# Patient Record
Sex: MALE | Race: WHITE
[De-identification: names, ages, dates, MRNs, and addresses within clinical notes are randomized per-mention and may not be internally consistent; named-entity substitution may affect disease eponyms.]

---

## 2019-01-23 ENCOUNTER — HOSPITAL ENCOUNTER (INPATIENT)
Dept: HOSPITAL 10 - E/R | Age: 79
LOS: 8 days | Discharge: NURSING, REHAB OR LONG-TERM CARE | DRG: 65 | End: 2019-01-31
Attending: INTERNAL MEDICINE | Admitting: INTERNAL MEDICINE
Payer: MEDICARE

## 2019-01-23 ENCOUNTER — HOSPITAL ENCOUNTER (INPATIENT)
Dept: HOSPITAL 91 - E/R | Age: 79
LOS: 8 days | Discharge: NURSING, REHAB OR LONG-TERM CARE | DRG: 65 | End: 2019-01-31
Payer: MEDICARE

## 2019-01-23 VITALS
BODY MASS INDEX: 23.8 KG/M2 | HEIGHT: 65 IN | HEIGHT: 65 IN | BODY MASS INDEX: 23.8 KG/M2 | WEIGHT: 142.86 LBS | WEIGHT: 142.86 LBS

## 2019-01-23 VITALS — SYSTOLIC BLOOD PRESSURE: 156 MMHG | DIASTOLIC BLOOD PRESSURE: 74 MMHG | RESPIRATION RATE: 20 BRPM | HEART RATE: 85 BPM

## 2019-01-23 VITALS — DIASTOLIC BLOOD PRESSURE: 63 MMHG | HEART RATE: 89 BPM | SYSTOLIC BLOOD PRESSURE: 118 MMHG | RESPIRATION RATE: 16 BRPM

## 2019-01-23 VITALS — HEART RATE: 91 BPM

## 2019-01-23 VITALS — HEART RATE: 85 BPM

## 2019-01-23 DIAGNOSIS — Z79.4: ICD-10-CM

## 2019-01-23 DIAGNOSIS — E11.9: ICD-10-CM

## 2019-01-23 DIAGNOSIS — N39.0: ICD-10-CM

## 2019-01-23 DIAGNOSIS — G20: ICD-10-CM

## 2019-01-23 DIAGNOSIS — G81.91: ICD-10-CM

## 2019-01-23 DIAGNOSIS — I63.9: Primary | ICD-10-CM

## 2019-01-23 LAB
ADD MAN DIFF?: NO
ADD UMIC: YES
ANION GAP: 13 (ref 5–13)
BASOPHIL #: 0.1 10^3/UL (ref 0–0.1)
BASOPHILS %: 0.5 % (ref 0–2)
BLOOD UREA NITROGEN: 25 MG/DL (ref 7–20)
CALCIUM: 10.1 MG/DL (ref 8.4–10.2)
CARBON DIOXIDE: 26 MMOL/L (ref 21–31)
CHLORIDE: 96 MMOL/L (ref 97–110)
CHOL/HDL RATIO: 5.2 RATIO
CHOLESTEROL: 152 MG/DL (ref 100–200)
CREATININE: 0.74 MG/DL (ref 0.61–1.24)
EOSINOPHILS #: 0.2 10^3/UL (ref 0–0.5)
EOSINOPHILS %: 1.4 % (ref 0–7)
GLUCOSE: 277 MG/DL (ref 70–220)
HDL CHOLESTEROL: 29 MG/DL (ref 31–75)
HEMATOCRIT: 43.6 % (ref 42–52)
HEMOGLOBIN A1C: 8.1 % (ref 0–5.9)
HEMOGLOBIN: 15 G/DL (ref 14–18)
IMMATURE GRANS #M: 0.07 10^3/UL (ref 0–0.03)
IMMATURE GRANS % (M): 0.6 % (ref 0–0.43)
INR: 0.99
LDL CHOLESTEROL,CALCULATED: 105 MG/DL
LYMPHOCYTES #: 1.1 10^3/UL (ref 0.8–2.9)
LYMPHOCYTES %: 9.2 % (ref 15–51)
MEAN CORPUSCULAR HEMOGLOBIN: 30.7 PG (ref 29–33)
MEAN CORPUSCULAR HGB CONC: 34.4 G/DL (ref 32–37)
MEAN CORPUSCULAR VOLUME: 89.3 FL (ref 82–101)
MEAN PLATELET VOLUME: 8.4 FL (ref 7.4–10.4)
MONOCYTE #: 1.3 10^3/UL (ref 0.3–0.9)
MONOCYTES %: 10.7 % (ref 0–11)
NEUTROPHIL #: 9.6 10^3/UL (ref 1.6–7.5)
NEUTROPHILS %: 77.6 % (ref 39–77)
NUCLEATED RED BLOOD CELLS #: 0 10^3/UL (ref 0–0)
NUCLEATED RED BLOOD CELLS%: 0 /100WBC (ref 0–0)
PARTIAL THROMBOPLASTIN TIME: 29.2 SEC (ref 23–35)
PLATELET COUNT: 402 10^3/UL (ref 140–415)
POTASSIUM: 4.4 MMOL/L (ref 3.5–5.1)
PROTIME: 13.2 SEC (ref 11.9–14.9)
PT RATIO: 1
RED BLOOD COUNT: 4.88 10^6/UL (ref 4.7–6.1)
RED CELL DISTRIBUTION WIDTH: 12.1 % (ref 11.5–14.5)
SODIUM: 135 MMOL/L (ref 135–144)
TRIGLYCERIDES: 88 MG/DL (ref 0–149)
TROPONIN-I: < 0.012 NG/ML (ref 0–0.12)
UR ASCORBIC ACID: NEGATIVE MG/DL
UR BACTERIA: (no result) /HPF
UR BILIRUBIN (DIP): NEGATIVE MG/DL
UR BLOOD (DIP): (no result) MG/DL
UR CLARITY: (no result)
UR COLOR: (no result)
UR GLUCOSE (DIP): (no result) MG/DL
UR KETONES (DIP): (no result) MG/DL
UR LEUKOCYTE ESTERASE (DIP): (no result) LEU/UL
UR MUCUS: (no result) /HPF
UR NITRITE (DIP): POSITIVE MG/DL
UR PH (DIP): 5 (ref 5–9)
UR RBC: 14 /HPF (ref 0–5)
UR SPECIFIC GRAVITY (DIP): 1.02 (ref 1–1.03)
UR TOTAL PROTEIN (DIP): (no result) MG/DL
UR UROBILINOGEN (DIP): (no result) MG/DL
UR WBC: > 182 /HPF (ref 0–5)
WHITE BLOOD COUNT: 12.3 10^3/UL (ref 4.8–10.8)

## 2019-01-23 PROCEDURE — 82962 GLUCOSE BLOOD TEST: CPT

## 2019-01-23 PROCEDURE — 71045 X-RAY EXAM CHEST 1 VIEW: CPT

## 2019-01-23 PROCEDURE — 73030 X-RAY EXAM OF SHOULDER: CPT

## 2019-01-23 PROCEDURE — 93306 TTE W/DOPPLER COMPLETE: CPT

## 2019-01-23 PROCEDURE — 92526 ORAL FUNCTION THERAPY: CPT

## 2019-01-23 PROCEDURE — 70551 MRI BRAIN STEM W/O DYE: CPT

## 2019-01-23 PROCEDURE — 80048 BASIC METABOLIC PNL TOTAL CA: CPT

## 2019-01-23 PROCEDURE — 97110 THERAPEUTIC EXERCISES: CPT

## 2019-01-23 PROCEDURE — 93880 EXTRACRANIAL BILAT STUDY: CPT

## 2019-01-23 PROCEDURE — 70450 CT HEAD/BRAIN W/O DYE: CPT

## 2019-01-23 PROCEDURE — 85730 THROMBOPLASTIN TIME PARTIAL: CPT

## 2019-01-23 PROCEDURE — 97530 THERAPEUTIC ACTIVITIES: CPT

## 2019-01-23 PROCEDURE — 92610 EVALUATE SWALLOWING FUNCTION: CPT

## 2019-01-23 PROCEDURE — 80053 COMPREHEN METABOLIC PANEL: CPT

## 2019-01-23 PROCEDURE — 87086 URINE CULTURE/COLONY COUNT: CPT

## 2019-01-23 PROCEDURE — 85025 COMPLETE CBC W/AUTO DIFF WBC: CPT

## 2019-01-23 PROCEDURE — 80061 LIPID PANEL: CPT

## 2019-01-23 PROCEDURE — 85610 PROTHROMBIN TIME: CPT

## 2019-01-23 PROCEDURE — 97163 PT EVAL HIGH COMPLEX 45 MIN: CPT

## 2019-01-23 PROCEDURE — 97165 OT EVAL LOW COMPLEX 30 MIN: CPT

## 2019-01-23 PROCEDURE — 83036 HEMOGLOBIN GLYCOSYLATED A1C: CPT

## 2019-01-23 PROCEDURE — 36415 COLL VENOUS BLD VENIPUNCTURE: CPT

## 2019-01-23 PROCEDURE — 83735 ASSAY OF MAGNESIUM: CPT

## 2019-01-23 PROCEDURE — 97535 SELF CARE MNGMENT TRAINING: CPT

## 2019-01-23 PROCEDURE — 99291 CRITICAL CARE FIRST HOUR: CPT

## 2019-01-23 PROCEDURE — 93005 ELECTROCARDIOGRAM TRACING: CPT

## 2019-01-23 PROCEDURE — 81001 URINALYSIS AUTO W/SCOPE: CPT

## 2019-01-23 PROCEDURE — 80307 DRUG TEST PRSMV CHEM ANLYZR: CPT

## 2019-01-23 PROCEDURE — 84484 ASSAY OF TROPONIN QUANT: CPT

## 2019-01-23 PROCEDURE — 87040 BLOOD CULTURE FOR BACTERIA: CPT

## 2019-01-23 RX ADMIN — ASPIRIN 1 MG: 300 SUPPOSITORY RECTAL at 12:47

## 2019-01-23 RX ADMIN — CEFTRIAXONE SCH MLS/HR: 1 INJECTION, SOLUTION INTRAVENOUS at 17:33

## 2019-01-23 RX ADMIN — INSULIN ASPART 1 UNIT: 100 INJECTION, SOLUTION INTRAVENOUS; SUBCUTANEOUS at 23:45

## 2019-01-23 RX ADMIN — INSULIN ASPART 1 UNIT: 100 INJECTION, SOLUTION INTRAVENOUS; SUBCUTANEOUS at 18:40

## 2019-01-23 RX ADMIN — FAMOTIDINE 1 MG: 10 INJECTION, SOLUTION INTRAVENOUS at 21:38

## 2019-01-23 RX ADMIN — DIPHENHYDRAMINE HYDROCHLORIDE SCH MG: 50 INJECTION, SOLUTION INTRAMUSCULAR; INTRAVENOUS at 21:38

## 2019-01-23 RX ADMIN — DEXTROSE, SODIUM CHLORIDE, AND POTASSIUM CHLORIDE 1 MLS/HR: 5; .45; .15 INJECTION INTRAVENOUS at 17:33

## 2019-01-23 RX ADMIN — CEFTRIAXONE 1 MLS/HR: 1 INJECTION, SOLUTION INTRAVENOUS at 17:33

## 2019-01-23 RX ADMIN — DEXTROSE, SODIUM CHLORIDE, AND POTASSIUM CHLORIDE SCH MLS/HR: 5; .45; .15 INJECTION INTRAVENOUS at 17:33

## 2019-01-23 NOTE — NUR
EOSS

RCVD THIS PT. FROM [facility] AROUND 17:30PM. PT. IS ALERT, CALL LIGHT W/IN REACH. NO SOB OR 
DISTRESS NOTED. GIVEN THE DUE INSULIN, STILL ON NPO BUT WITH RUNNING IVF OF D5 1/2NS + 20MEQ 
KCL AT 70ML/HR INFUSING WELL ON PATENT IV CANNULA. KEPT CLEAN AND DRY. WILL CONTINUE TO 
MONITOR.

## 2019-01-23 NOTE — ERD
ER Documentation


Chief Complaint


Chief Complaint





right weak weakness, drooling onset yeterday at 0930





HPI


Patient is a 78-year-old male with diabetes who presents with weakness.  The 


patient had sudden onset of weakness yesterday morning at 9 AM.  He collapsed 


and was weak and drooling.  He has persistent right-sided arm and leg weakness 


and right-sided arm pain.  His primary doctor is Dr. Carlos Duenas.  He has had no 


treatment as of yet.





ROS


All systems reviewed and are negative except as per history of present illness.





Medications


Home Meds


Reported Medications


Metformin* (Glucophage*) 500 Mg Tab, 500 MG PO BID, #90 TAB


   1/23/19


Carbidopa/Levodopa (CARBIDOPA-LEVO  MG ODT) 1 Each Tab.rapdis, 1 TAB PO 


TID, #90 TAB


   1/23/19


Discontinued Reported Medications


Metformin  


   7/3/11


Glyburide  


   7/3/11





Allergies


Allergies:  


Coded Allergies:  


     No Known Drug Allergies (Verified  Allergy, Mild, 7/3/11)





PMhx/Soc


History of Surgery:  No


Anesthesia Reaction:  No


Hx Neurological Disorder:  No


Hx Respiratory Disorders:  No


Hx Cardiac Disorders:  No


Hx Psychiatric Problems:  No


Hx Miscellaneous Medical Probl:  Yes (DM , PARKINSON'S )


Hx Alcohol Use:  No


Hx Substance Use:  No


Hx Tobacco Use:  No


Smoking Status:  Never smoker





FmHx


Family History:  No diabetes





Physical Exam


Vitals





Vital Signs


  Date      Temp  Pulse  Resp  B/P (MAP)   Pulse Ox  O2          O2 Flow    FiO2


Time                                                 Delivery    Rate


   1/23/19  98.1     86    17      145/93        96  Room Air


     12:55                          (110)


   1/23/19  98.1     97    18      149/96        99


     10:53                          (113)





Physical Exam


Const:   No acute distress


Head:   Atraumatic 


Eyes:    Normal Conjunctiva


ENT:    Normal External Ears, Nose and Mouth.


Neck:               Full range of motion. No meningismus.


Resp:   Clear to auscultation bilaterally


Cardio:   Regular rate and rhythm, no murmurs


Abd:    Soft, non tender, non distended. Normal bowel sounds


Skin:   No petechiae or rashes


Back:   No midline or flank tenderness


Ext:    No cyanosis, or edema


Neur:   Awake and alert, right-sided arm weakness and leg weakness, decreased 


 strength right compared to left


Result Diagram:  


1/23/19 1113                                                                    


           1/23/19 1113





Results 24 hrs





Laboratory Tests


Test
                              1/23/19
11:13  1/23/19
11:15    1/23/19
14:05


White Blood Count                  12.3 10^3/ul


Red Blood Count                    4.88 10^6/ul


Hemoglobin                            15.0 g/dl


Hematocrit                               43.6 %


Mean Corpuscular Volume                 89.3 fl


Mean Corpuscular Hemoglobin             30.7 pg


Mean Corpuscular                     34.4 g/dl 
  
              



Hemoglobin
Concent


Red Cell Distribution Width              12.1 %


Platelet Count                      402 10^3/UL


Mean Platelet Volume                     8.4 fl


Immature Granulocytes %                 0.600 %


Neutrophils %                            77.6 %


Lymphocytes %                             9.2 %


Monocytes %                              10.7 %


Eosinophils %                             1.4 %


Basophils %                               0.5 %


Nucleated Red Blood Cells %         0.0 /100WBC


Immature Granulocytes #           0.070 10^3/ul


Neutrophils #                       9.6 10^3/ul


Lymphocytes #                       1.1 10^3/ul


Monocytes #                         1.3 10^3/ul


Eosinophils #                       0.2 10^3/ul


Basophils #                         0.1 10^3/ul


Nucleated Red Blood Cells #         0.0 10^3/ul


Prothrombin Time                       13.2 Sec


Prothrombin Time Ratio                      1.0


INR International                         0.99 
  
              



Normalized
Ratio


Activated Partial
Thromboplast        29.2 Sec 
  
              



Time


Sodium Level                         135 mmol/L


Potassium Level                      4.4 mmol/L


Chloride Level                        96 mmol/L


Carbon Dioxide Level                  26 mmol/L


Anion Gap                                    13


Blood Urea Nitrogen                    25 mg/dl


Creatinine                           0.74 mg/dl


Est Glomerular Filtrat             mL/min 
       
              



Rate
mL/min


Glucose Level                         277 mg/dl


Bedside Glucose                       248 mg/dL


Calcium Level                        10.1 mg/dl


Troponin I                        < 0.012 ng/ml


Triglycerides Level                    88 mg/dl


Cholesterol Level                     152 mg/dl


LDL Cholesterol, Calculated           105 mg/dl


HDL Cholesterol                        29 mg/dl


Cholesterol/HDL Ratio                 5.2 RATIO


Hemoglobin A1c                                           8.1 %


Urine Color                                                      JOHANNY


Urine Clarity                                                    CLOUDY


Urine pH                                                                    5.0


Urine Specific Gravity                                                    1.024


Urine Ketones                                                    TRACE mg/dL


Urine Nitrite                                                    POSITIVE mg/dL


Urine Bilirubin                                                  NEGATIVE mg/dL


Urine Urobilinogen                                                     1+ mg/dL


Urine Leukocyte Esterase                                              3+ Minerva/ul


Urine Microscopic RBC                                                   14 /HPF


Urine Microscopic WBC                                            > 182 /HPF


Urine Bacteria                                                   MANY /HPF


Urine Mucus                                                      MANY /HPF


Urine Hemoglobin                                                       2+ mg/dL


Urine Glucose                                                          3+ mg/dL


Urine Total Protein                                                    2+ mg/dl





Current Medications


 Medications
   Dose
          Sig/Darvin
       Start Time
   Status  Last


 (Trade)       Ordered        Route
 PRN     Stop Time              Admin
Dose


                              Reason                                Admin


 Aspirin
       325 mg         ONCE  ONCE
    1/23/19       DC       



(Aspirin)                     PO
            12:30



                                             1/23/19 12:36


 Aspirin
       300 mg         ONCE  ONCE
    1/23/19       DC           1/23/19


(Aspirin)                     AL
            13:00
                       12:47



                                             1/23/19 13:01


 Ondansetron    4 mg           ER BRIDGE      1/23/19                



HCl
  (Zofran                 PRN
 IV
       14:30



Inj)                          vomiting       1/24/19 14:29


                650 mg         ER BRIDGE      1/23/19                



Acetaminophen                 PRN
 PO
 pain  14:30




  (Tylenol                                  1/24/19 14:29


Tab)








Procedures/MDM


CT brain shows no bleed per radiology.  Chest x-ray read by radiology.





EKG read by me: 


Rate/Rhythm: Regular rate and rhythm at a normal rate


Intervals:    Normal


Impression:     No evidence of ischemia or arrhythmia





Patient is a 78-year-old male who presents with acute stroke.  He is outside the


window for TPA.  He had an NIH stroke scale performed by nursing and a bedside 


swallow evaluation was done and the patient failed.  Therefore the patient was 


given rectal aspirin.  The patient will be admitted to the care of Dr. Win 


as I spoke with Dr. Duenas who asked me to admit the patient to Dr. Win.  I 


doubt intracranial mass or hemorrhage.





Critical Care:


   Time:                                 35 minutes excluding all billable 


procedures.


   Treatments/Evaluations:      Close monitoring and treatment of unstable vital


signs, cardiorespiratory, and neurologic status, while maintaining tight balance


of fluid, respiratory, and cardiac interventions.





Departure


Diagnosis:  


   Primary Impression:  


   Stroke


   CVA mechanism:  unspecified  Qualified Codes:  I63.9 - Cerebral infarction, 


   unspecified


   Additional Impression:  


   Acute weakness


Condition:  Serious











LOGAN SAMSON MD                Jan 23, 2019 14:41

## 2019-01-23 NOTE — HP
Date/Time of Note


Date/Time of Note


DATE: 1/23/19 


TIME: 16:03





Assessment/Plan


VTE Prophylaxis


SCD applied (from Nsg):  Yes


Pharmacological prophylaxis:  LMWH





Lines/Catheters


IV Catheter Type (from Nrsg):  Saline Lock





Assessment/Plan


Assessment/Plan


-Acute stroke.  Continue aspirin.  Speech therapy PT and occupational therapy 


evaluation.  Dr. Braden is asked to see patient in neurology 


consultation.  Will obtain MRI of the brain.


-UTI per UA, will obtain urine culture, will start Rocephin.


-Diabetes mellitus type 2, will obtain hemoglobin A1c, NovoLog per sliding scale


every 6 hours.  Patient is failed bedside swallow eval will start IV fluids with


dextrose.


-Parkinson's disease, will resume Sinemet when patient will be able to take p.o.





Further recommendations based on clinical course.  Plan of care discussed with 


Dr. Win.


Result Diagram:  


1/23/19 1113                                                                    


           1/23/19 1113





Results 24hrs





Laboratory Tests


Test
                                1/23/19
11:13  1/23/19
11:15  1/23/19
14:05


White Blood Count                          12.3  H


Red Blood Count                             4.88


Hemoglobin                                  15.0


Hematocrit                                  43.6


Mean Corpuscular Volume                     89.3


Mean Corpuscular Hemoglobin                 30.7


Mean Corpuscular Hemoglobin
Concent        34.4  
  
              



Red Cell Distribution Width                 12.1


Platelet Count                               402


Mean Platelet Volume                         8.4


Immature Granulocytes %                   0.600  H


Neutrophils %                              77.6  H


Lymphocytes %                               9.2  L


Monocytes %                                 10.7


Eosinophils %                                1.4


Basophils %                                  0.5


Nucleated Red Blood Cells %                  0.0


Immature Granulocytes #                   0.070  H


Neutrophils #                               9.6  H


Lymphocytes #                                1.1


Monocytes #                                 1.3  H


Eosinophils #                                0.2


Basophils #                                  0.1


Nucleated Red Blood Cells #                  0.0


Prothrombin Time                            13.2


Prothrombin Time Ratio                       1.0


INR International Normalized
Ratio         0.99  
  
              



Activated Partial
Thromboplast Time        29.2  
  
              



Sodium Level                                 135


Potassium Level                              4.4


Chloride Level                               96  L


Carbon Dioxide Level                          26


Anion Gap                                     13


Blood Urea Nitrogen                          25  H


Creatinine                                  0.74


Est Glomerular Filtrat Rate
mL/min     
            
              



Glucose Level                               277  H


Bedside Glucose                             248  H


Calcium Level                               10.1


Troponin I                           < 0.012


Triglycerides Level                           88


Cholesterol Level                            152


LDL Cholesterol, Calculated                  105


HDL Cholesterol                              29  L


Cholesterol/HDL Ratio                        5.2


Hemoglobin A1c                                             8.1  H


Urine Color                                                        JOHANNY


Urine Clarity                                                      CLOUDY  A


Urine pH                                                                   5.0


Urine Specific Gravity                                                   1.024


Urine Ketones                                                      TRACE  A


Urine Nitrite                                                      POSITIVE  A


Urine Bilirubin                                                    NEGATIVE


Urine Urobilinogen                                                         1+  H


Urine Leukocyte Esterase                                                   3+  H


Urine Microscopic RBC                                                      14  H


Urine Microscopic WBC                                              > 182  H


Urine Bacteria                                                     MANY  A


Urine Mucus                                                        MANY  A


Urine Hemoglobin                                                           2+  H


Urine Glucose                                                              3+  H


Urine Total Protein                                                        2+  H


Urine Opiates Screen                                               Negative


Urine Barbiturates                                                 Negative


Urine Amphetamines Screen                                          Negative


Urine Benzodiazepines Screen                                       Negative


Urine Cocaine Screen                                               Negative


Urine Cannabinoids                                                 Negative








HPI/ROS


Admit Date/Time


Admit Date/Time


Jan 23, 2019 at 14:08





Hx of Present Illness


Patient is 78-year-old gentleman with history of diabetes and Parkinson's 


disease.  Patient had sudden onset of right-sided weakness and drooling 


yesterday at 9:00 in the morning witnessed by patient's wife.  Patient also 


collapsed due to persistent right-sided weakness and pain.  CT of the head was 


negative for hemorrhagic stroke.  Patient was diagnosed with acute stroke but he


was outside of the window for TPA.  Patient was given a rectal aspirin since he 


failed bedside swallow evaluation.  Patient denies fever, chills, denies 


shortness of breath, denies chest pain, denies lower extremity pain.  Patient is


admitted for further evaluation and management.





ROS


12 point review of system is negative except for that mentioned in HPI





PMH/Family/Social


Past Medical History


Medical History:  diabetes, other (Parkinson's disease)


Medications





Current Medications


Ondansetron HCl (Zofran Inj) 4 mg ER BRIDGE PRN IV vomiting;  Start 1/23/19 at 


14:30;  Stop 1/24/19 at 14:29


Acetaminophen (Tylenol Tab) 650 mg ER BRIDGE PRN PO pain;  Start 1/23/19 at 


14:30;  Stop 1/24/19 at 14:29


Coded Allergies:  


     No Known Drug Allergies (Verified  Allergy, Mild, 7/3/11)





Past Surgical History


Past Surgical Hx:  other (Status post skin cancer surgery mid chest)





Family History


Significant Family History:  no pertinent family hx, other





Social History


Alcohol Use:  none


Smoking Status:  Never smoker


Drug Use:  none





Exam/Review of Systems


Vital Signs


Vitals





Vital Signs


  Date      Temp  Pulse  Resp  B/P (MAP)   Pulse Ox  O2          O2 Flow    FiO2


Time                                                 Delivery    Rate


   1/23/19           85


     15:44


   1/23/19  97.8           20      156/74        94  Room Air


     15:13                          (101)








Exam


Constitutional:  alert, oriented


Head:  normocephalic


Neck:  supple


Respiratory:  clear to auscultation


Cardiovascular:  nl pulses


Gastrointestinal:  soft, non-tender


Musculoskeletal:  nl extremities to inspection


Extremities:  normal pulses


Neurological:  other (Right-sided weakness)


Skin:  other (Right knee abrasion, mid chest scar)











LUCY CHEN             Jan 23, 2019 16:11

## 2019-01-24 VITALS — HEART RATE: 95 BPM | RESPIRATION RATE: 18 BRPM | SYSTOLIC BLOOD PRESSURE: 140 MMHG | DIASTOLIC BLOOD PRESSURE: 67 MMHG

## 2019-01-24 VITALS — SYSTOLIC BLOOD PRESSURE: 136 MMHG | HEART RATE: 96 BPM | DIASTOLIC BLOOD PRESSURE: 70 MMHG | RESPIRATION RATE: 19 BRPM

## 2019-01-24 VITALS — DIASTOLIC BLOOD PRESSURE: 60 MMHG | RESPIRATION RATE: 19 BRPM | SYSTOLIC BLOOD PRESSURE: 120 MMHG | HEART RATE: 93 BPM

## 2019-01-24 VITALS — RESPIRATION RATE: 18 BRPM | HEART RATE: 88 BPM | DIASTOLIC BLOOD PRESSURE: 62 MMHG | SYSTOLIC BLOOD PRESSURE: 123 MMHG

## 2019-01-24 VITALS — RESPIRATION RATE: 19 BRPM | HEART RATE: 60 BPM | SYSTOLIC BLOOD PRESSURE: 133 MMHG | DIASTOLIC BLOOD PRESSURE: 68 MMHG

## 2019-01-24 VITALS — RESPIRATION RATE: 18 BRPM | DIASTOLIC BLOOD PRESSURE: 67 MMHG | HEART RATE: 95 BPM | SYSTOLIC BLOOD PRESSURE: 121 MMHG

## 2019-01-24 VITALS — HEART RATE: 97 BPM

## 2019-01-24 VITALS — HEART RATE: 94 BPM

## 2019-01-24 VITALS — HEART RATE: 98 BPM

## 2019-01-24 LAB
ADD MAN DIFF?: NO
ALANINE AMINOTRANSFERASE: 23 IU/L (ref 13–69)
ALBUMIN/GLOBULIN RATIO: 0.97
ALBUMIN: 3.5 G/DL (ref 3.3–4.9)
ALKALINE PHOSPHATASE: 63 IU/L (ref 42–121)
ANION GAP: 15 (ref 5–13)
ASPARTATE AMINO TRANSFERASE: 22 IU/L (ref 15–46)
BASOPHIL #: 0.1 10^3/UL (ref 0–0.1)
BASOPHILS %: 0.5 % (ref 0–2)
BILIRUBIN,DIRECT: 0 MG/DL (ref 0–0.2)
BILIRUBIN,TOTAL: 0.5 MG/DL (ref 0.2–1.3)
BLOOD UREA NITROGEN: 20 MG/DL (ref 7–20)
CALCIUM: 9.3 MG/DL (ref 8.4–10.2)
CARBON DIOXIDE: 24 MMOL/L (ref 21–31)
CHLORIDE: 98 MMOL/L (ref 97–110)
CHOL/HDL RATIO: 5.3 RATIO
CHOLESTEROL: 134 MG/DL (ref 100–200)
CREATININE: 0.68 MG/DL (ref 0.61–1.24)
EOSINOPHILS #: 0.3 10^3/UL (ref 0–0.5)
EOSINOPHILS %: 2.2 % (ref 0–7)
GLOBULIN: 3.6 G/DL (ref 1.3–3.2)
GLUCOSE: 220 MG/DL (ref 70–220)
HDL CHOLESTEROL: 25 MG/DL (ref 31–75)
HEMATOCRIT: 39.8 % (ref 42–52)
HEMOGLOBIN A1C: 8.1 % (ref 0–5.9)
HEMOGLOBIN: 13.4 G/DL (ref 14–18)
IMMATURE GRANS #M: 0.08 10^3/UL (ref 0–0.03)
IMMATURE GRANS % (M): 0.7 % (ref 0–0.43)
LDL CHOLESTEROL,CALCULATED: 91 MG/DL
LYMPHOCYTES #: 0.9 10^3/UL (ref 0.8–2.9)
LYMPHOCYTES %: 7.9 % (ref 15–51)
MAGNESIUM: 1.9 MG/DL (ref 1.7–2.5)
MEAN CORPUSCULAR HEMOGLOBIN: 30.7 PG (ref 29–33)
MEAN CORPUSCULAR HGB CONC: 33.7 G/DL (ref 32–37)
MEAN CORPUSCULAR VOLUME: 91.1 FL (ref 82–101)
MEAN PLATELET VOLUME: 8.7 FL (ref 7.4–10.4)
MONOCYTE #: 1.4 10^3/UL (ref 0.3–0.9)
MONOCYTES %: 11.7 % (ref 0–11)
NEUTROPHIL #: 9.1 10^3/UL (ref 1.6–7.5)
NEUTROPHILS %: 77 % (ref 39–77)
NUCLEATED RED BLOOD CELLS #: 0 10^3/UL (ref 0–0)
NUCLEATED RED BLOOD CELLS%: 0 /100WBC (ref 0–0)
PLATELET COUNT: 377 10^3/UL (ref 140–415)
POTASSIUM: 4.4 MMOL/L (ref 3.5–5.1)
RED BLOOD COUNT: 4.37 10^6/UL (ref 4.7–6.1)
RED CELL DISTRIBUTION WIDTH: 12 % (ref 11.5–14.5)
SODIUM: 137 MMOL/L (ref 135–144)
TOTAL PROTEIN: 7.1 G/DL (ref 6.1–8.1)
TRIGLYCERIDES: 91 MG/DL (ref 0–149)
WHITE BLOOD COUNT: 11.8 10^3/UL (ref 4.8–10.8)

## 2019-01-24 RX ADMIN — DEXTROSE, SODIUM CHLORIDE, AND POTASSIUM CHLORIDE SCH MLS/HR: 5; .45; .15 INJECTION INTRAVENOUS at 06:50

## 2019-01-24 RX ADMIN — CARBIDOPA AND LEVODOPA 1 TAB: 25; 100 TABLET ORAL at 09:26

## 2019-01-24 RX ADMIN — DIPHENHYDRAMINE HYDROCHLORIDE SCH MG: 50 INJECTION, SOLUTION INTRAMUSCULAR; INTRAVENOUS at 08:30

## 2019-01-24 RX ADMIN — DEXTROSE, SODIUM CHLORIDE, AND POTASSIUM CHLORIDE 1 MLS/HR: 5; .45; .15 INJECTION INTRAVENOUS at 20:40

## 2019-01-24 RX ADMIN — CARBIDOPA AND LEVODOPA SCH TAB: 25; 100 TABLET ORAL at 12:02

## 2019-01-24 RX ADMIN — CEFTRIAXONE 1 MLS/HR: 1 INJECTION, SOLUTION INTRAVENOUS at 16:38

## 2019-01-24 RX ADMIN — FAMOTIDINE 1 MG: 10 INJECTION, SOLUTION INTRAVENOUS at 20:44

## 2019-01-24 RX ADMIN — CARBIDOPA AND LEVODOPA 1 TAB: 25; 100 TABLET ORAL at 12:02

## 2019-01-24 RX ADMIN — DEXTROSE, SODIUM CHLORIDE, AND POTASSIUM CHLORIDE SCH MLS/HR: 5; .45; .15 INJECTION INTRAVENOUS at 20:40

## 2019-01-24 RX ADMIN — FAMOTIDINE 1 MG: 10 INJECTION, SOLUTION INTRAVENOUS at 08:30

## 2019-01-24 RX ADMIN — ATORVASTATIN CALCIUM 1 MG: 40 TABLET, FILM COATED ORAL at 20:44

## 2019-01-24 RX ADMIN — INSULIN ASPART 1 UNIT: 100 INJECTION, SOLUTION INTRAVENOUS; SUBCUTANEOUS at 05:13

## 2019-01-24 RX ADMIN — INSULIN ASPART 1 UNIT: 100 INJECTION, SOLUTION INTRAVENOUS; SUBCUTANEOUS at 20:59

## 2019-01-24 RX ADMIN — CEFTRIAXONE SCH MLS/HR: 1 INJECTION, SOLUTION INTRAVENOUS at 16:38

## 2019-01-24 RX ADMIN — ATORVASTATIN CALCIUM SCH MG: 40 TABLET, FILM COATED ORAL at 20:44

## 2019-01-24 RX ADMIN — CARBIDOPA AND LEVODOPA 1 TAB: 25; 100 TABLET ORAL at 20:44

## 2019-01-24 RX ADMIN — CARBIDOPA AND LEVODOPA SCH TAB: 25; 100 TABLET ORAL at 09:26

## 2019-01-24 RX ADMIN — INSULIN ASPART 1 UNIT: 100 INJECTION, SOLUTION INTRAVENOUS; SUBCUTANEOUS at 12:12

## 2019-01-24 RX ADMIN — INSULIN ASPART 1 UNIT: 100 INJECTION, SOLUTION INTRAVENOUS; SUBCUTANEOUS at 17:50

## 2019-01-24 RX ADMIN — CARBIDOPA AND LEVODOPA SCH TAB: 25; 100 TABLET ORAL at 20:44

## 2019-01-24 RX ADMIN — DEXTROSE, SODIUM CHLORIDE, AND POTASSIUM CHLORIDE 1 MLS/HR: 5; .45; .15 INJECTION INTRAVENOUS at 06:50

## 2019-01-24 RX ADMIN — DIPHENHYDRAMINE HYDROCHLORIDE SCH MG: 50 INJECTION, SOLUTION INTRAMUSCULAR; INTRAVENOUS at 20:44

## 2019-01-24 NOTE — NUR
eoss: No major event this shift. Hemodynamically stable. no neuro changes. no signs of 
respiratory distress. Limited range of motion on right arm/shoulder due to pain. Continue 
NPO. Repositioned per protocol. Safety measures followed. Will continue to monitor per 
protocol. Plan of care remains the same. chart reviewed.

## 2019-01-24 NOTE — PN
Date/Time of Note


Date/Time of Note


DATE: 1/24/19 


TIME: 13:07





Assessment/Plan


VTE Prophylaxis


Risk score (from Ns)>0 risk:  4


SCD applied (from Ns):  Yes


Pharmacological prophylaxis:  LMWH





Lines/Catheters


IV Catheter Type (from [facility]):  Peripheral IV


Urinary Cath still in place:  No (Condom cath)





Assessment/Plan


Hospital Course


Patient passed swallow eval, started on diet continue to monitor for aspiration 


continue physical and occupational therapy.  Pending MRI of the brain.


Assessment/Plan


-Acute stroke with right-sided weakness.  Continue aspirin Lipitor.  Continue PT


and OT.   Dr. Braden is following  in neurology consultation.  


-Gram-negative rods UTI, continue Rocephin.  


-Diabetes mellitus type 2 with hemoglobin A1c 8.1.  


-Parkinson's disease, continue Sinemet.





Further recommendations based on clinical course.  Plan of care discussed with 


Dr. Win.


Result Diagram:  


1/24/19 0514                                                                    


           1/24/19 0514





Results 24hrs





Laboratory Tests


Test
                 1/23/19
14:05  1/23/19
17:47  1/23/19
23:32  1/24/19
05:07


Urine Color           JOHANNY


Urine Clarity         CLOUDY  A


Urine pH                      5.0


Urine Specific              1.024


Gravity


Urine Ketones         TRACE  A


Urine Nitrite         POSITIVE  A


Urine Bilirubin       NEGATIVE


Urine Urobilinogen            1+  H


Urine Leukocyte               3+  H


Esterase


Urine Microscopic             14  H


RBC


Urine Microscopic     > 182  H


WBC


Urine Bacteria        MANY  A


Urine Mucus           MANY  A


Urine Hemoglobin              2+  H


Urine Glucose                 3+  H


Urine Total Protein           2+  H


Urine Opiates Screen  Negative


Urine Barbiturates    Negative


Urine Amphetamines    Negative


Screen


Urine                 Negative


Benzodiazepines


Screen


Urine Cocaine Screen  Negative


Urine Cannabinoids    Negative


Bedside Glucose                              185            220            211


Test
                 1/24/19
05:14  1/24/19
11:58  
              



White Blood Count           11.8  H


Red Blood Count             4.37  L


Hemoglobin                  13.4  L


Hematocrit                  39.8  L


Mean Corpuscular             91.1


Volume


Mean Corpuscular             30.7


Hemoglobin


Mean Corpuscular            33.7  
  
              
              



Hemoglobin
Concent


Red Cell                     12.0


Distribution Width


Platelet Count                377


Mean Platelet Volume          8.7


Immature                   0.700  H


Granulocytes %


Neutrophils %                77.0


Lymphocytes %                7.9  L


Monocytes %                 11.7  H


Eosinophils %                 2.2


Basophils %                   0.5


Nucleated Red Blood           0.0


Cells %


Immature                   0.080  H


Granulocytes #


Neutrophils #                9.1  H


Lymphocytes #                 0.9


Monocytes #                  1.4  H


Eosinophils #                 0.3


Basophils #                   0.1


Nucleated Red Blood           0.0


Cells #


Sodium Level                  137


Potassium Level               4.4


Chloride Level                 98


Carbon Dioxide Level           24


Anion Gap                     15  H


Blood Urea Nitrogen            20


Creatinine                   0.68


Est Glomerular          
            
              
              



Filtrat Rate
mL/min


Glucose Level                 220


Hemoglobin A1c               8.1  H


Calcium Level                 9.3


Magnesium Level               1.9


Total Bilirubin               0.5


Direct Bilirubin             0.00


Indirect Bilirubin            0.5


Aspartate Amino               22  
  
              
              



Transf
(AST/SGOT)


Alanine                       23  
  
              
              



Aminotransferase
(AL


T/SGPT)


Alkaline Phosphatase           63


Total Protein                 7.1


Albumin                       3.5


Globulin                    3.60  H


Albumin/Globulin             0.97


Ratio


Triglycerides Level            91


Cholesterol Level             134


LDL Cholesterol,               91


Calculated


HDL Cholesterol               25  L


Cholesterol/HDL               5.3


Ratio


Bedside Glucose                             240  H








Exam/Review of Systems


Exam


Vitals


Constitutional:  alert, oriented


Head:  normocephalic


Neck:  supple


Respiratory:  clear to auscultation


Cardiovascular:  nl pulses


Gastrointestinal:  soft, non-tender


Musculoskeletal:  nl extremities to inspection


Extremities:  normal pulses


Neurological:  other (Right-sided weakness)


Skin:  other (Right knee abrasion, mid chest scar)














Vital Signs


  Date      Temp  Pulse  Resp  B/P (MAP)   Pulse Ox  O2          O2 Flow    FiO2


Time                                                 Delivery    Rate


   1/24/19           98


     12:50


   1/24/19  97.9           19      133/68        94


     11:04                           (89)


   1/24/19                                           Room Air


     04:00








Intake and Output





1/23/19 1/23/19 1/24/19





1515:00


23:00


07:00





OutputOutput Total


400 ml





BalanceBalance


-400 ml














Results


Results 24hrs





Laboratory Tests


Test
                 1/23/19
14:05  1/23/19
17:47  1/23/19
23:32  1/24/19
05:07


Urine Color           JOHANNY


Urine Clarity         CLOUDY  A


Urine pH                      5.0


Urine Specific              1.024


Gravity


Urine Ketones         TRACE  A


Urine Nitrite         POSITIVE  A


Urine Bilirubin       NEGATIVE


Urine Urobilinogen            1+  H


Urine Leukocyte               3+  H


Esterase


Urine Microscopic             14  H


RBC


Urine Microscopic     > 182  H


WBC


Urine Bacteria        MANY  A


Urine Mucus           MANY  A


Urine Hemoglobin              2+  H


Urine Glucose                 3+  H


Urine Total Protein           2+  H


Urine Opiates Screen  Negative


Urine Barbiturates    Negative


Urine Amphetamines    Negative


Screen


Urine                 Negative


Benzodiazepines


Screen


Urine Cocaine Screen  Negative


Urine Cannabinoids    Negative


Bedside Glucose                              185            220            211


Test
                 1/24/19
05:14  1/24/19
11:58  
              



White Blood Count           11.8  H


Red Blood Count             4.37  L


Hemoglobin                  13.4  L


Hematocrit                  39.8  L


Mean Corpuscular             91.1


Volume


Mean Corpuscular             30.7


Hemoglobin


Mean Corpuscular            33.7  
  
              
              



Hemoglobin
Concent


Red Cell                     12.0


Distribution Width


Platelet Count                377


Mean Platelet Volume          8.7


Immature                   0.700  H


Granulocytes %


Neutrophils %                77.0


Lymphocytes %                7.9  L


Monocytes %                 11.7  H


Eosinophils %                 2.2


Basophils %                   0.5


Nucleated Red Blood           0.0


Cells %


Immature                   0.080  H


Granulocytes #


Neutrophils #                9.1  H


Lymphocytes #                 0.9


Monocytes #                  1.4  H


Eosinophils #                 0.3


Basophils #                   0.1


Nucleated Red Blood           0.0


Cells #


Sodium Level                  137


Potassium Level               4.4


Chloride Level                 98


Carbon Dioxide Level           24


Anion Gap                     15  H


Blood Urea Nitrogen            20


Creatinine                   0.68


Est Glomerular          
            
              
              



Filtrat Rate
mL/min


Glucose Level                 220


Hemoglobin A1c               8.1  H


Calcium Level                 9.3


Magnesium Level               1.9


Total Bilirubin               0.5


Direct Bilirubin             0.00


Indirect Bilirubin            0.5


Aspartate Amino               22  
  
              
              



Transf
(AST/SGOT)


Alanine                       23  
  
              
              



Aminotransferase
(AL


T/SGPT)


Alkaline Phosphatase           63


Total Protein                 7.1


Albumin                       3.5


Globulin                    3.60  H


Albumin/Globulin             0.97


Ratio


Triglycerides Level            91


Cholesterol Level             134


LDL Cholesterol,               91


Calculated


HDL Cholesterol               25  L


Cholesterol/HDL               5.3


Ratio


Bedside Glucose                             240  H














LUCY CHEN             Jan 24, 2019 13:11

## 2019-01-24 NOTE — NUR
Bedside swallow evaluation completed: 



Pt is a 77 y/o male being treated on telemetry unit for acute CVA, and UTI. Med hx is also 
significant for Parkinson's disease, which was diagnosed a few months ago, and diabetes 
mellitus type 2. He initially presented to the ER with complaints of of right upper and 
lower extremity weakness, right-sided drooling and right arm pain, which started yesterday 
morning.   He stated that at home he was able to eat and drink and does not exhibit any 
definite dysphagia.  

CT of the head revealed: Atrophy, White matter disease, compatible with chronic small vessel 
ischemia. Chronic lacunar infarcts left caudate nucleus and left frontal corona radiata. 
Left basal ganglia infarct of unknown chronicity. Patient is currently awaiting MRI. 



RN reports pt failed the swallow screen in the ER so he has been NPO since admission. He 
does not report hx dysphagia. Speech is slurred with red. vocal intensity and short, fast 
bursts of speech. Pt benefits from verbal prompting for repetition and clarification to 
improve speech intelligibility. Pt reports his speech has changed since the onset of his 
symptoms. 



Oral mech: Mild right facial asymmetry, more prominently noted with labial retraction. 
Adequate labial seal. Mild-mod reduced lingual strength noted with lateralization. Reduced 
lingual ROM. Adequate hyolaryngeal excursion. 



Trials: Thin liquids via spoon, cup and straw (8 oz total), pureed solids (pudding cup), 
soft solids (dry and mixed consist), regular solids (han cracker). 



Oral phase: Prolonged bolus hold with all consistencies. Red. bolus manipulation with puree. 
Suspect adequate containment within the oral cavity. Increased time to initiate a/p transit. 
No oral residue.

Pharyngeal phase: Suspect mild delayed initiation of swallow. Repeat swallows noted with 
larger boluses and regular solids. Throat clear x1 after reg solids and x1 after straw sips 
thin liquids. Tolerated all other trials without s/s aspiration. 



Notified RN and pt regarding diet recommendations. Verbalized comprehension.



Recommendations: 

Reg. solids/ thin liquids 

Small bites/ sips, single sips 

Meds whole in thin or puree 

Maintain aspiration precautions / oral care guidelines 

ST to f/u for dysphagia x1-2 and SP. eval

## 2019-01-24 NOTE — CONS
DATE OF ADMISSION: 01/23/2019

DATE OF CONSULTATION:  

 

 

 

TYPE OF CONSULTATION:  Neurological.

 

Thank you, Dr. Win, for your kind referral for evaluation of stroke.

 

HISTORY OF PRESENT ILLNESS:  The patient is a 78-year-old gentleman with history of Parkinson disease
 diagnosed few months ago as well as diabetes on metformin, who presented with complaints of right up
per and lower extremity weakness, right-sided drooling which started yesterday in the morning.  He al
so complains of right arm pain.  He stated that at home he was able to eat and drink and does not exh
ibit any definite dysphagia.  No complaints of headaches, diplopia, numbness anywhere.  His weakness 
seemed to be stable and not improving.  Since admission, he had CAT scan of the head which was read a
s atrophy, white matter disease, chronic lacunar infarcts in basal ganglia on the left corona radiata
.  Also, chest x-ray was done and was normal.

 

ALLERGIES:  NONE.

 

SOCIAL HISTORY:  No alcohol, tobacco, drug use.

 

FAMILY HISTORY:  Noncontributory.

 

MEDICATIONS PRIOR TO ADMISSION:

1.  Metformin.

2.  Carbidopa/levodopa 25/100 mg 3 times a day.

 

CURRENT MEDICATIONS:  He is currently, he is on:

1.  Pepcid.

2.  Insulin.

3.  He received ceftriaxone.

4.  He received aspirin in the emergency room.

 

Speech therapy was requested as well as evaluation by physical and occupational therapies.  He was pl
aced on n.p.o. until evaluation.

 

LABORATORY DATA:  Show WBC count 12.3.  Chemistry:  BUN 25, creatinine 0.74.  Hemoglobin A1c is 8.1. 
 Cholesterol 152, .  Troponins negative.  Urinalysis:  3+ leukocyte esterase, more than 182 WB
Cs.  Normal PT, PTT.  Negative drug screen.

 

PHYSICAL EXAMINATION:

VITAL SIGNS:  Temperature 97.8, 91 pulse, 16 respirations, 118/63 blood pressure.

GENERAL:  Not in acute distress, lying in bed.

HEENT:  Normocephalic, atraumatic head.

NECK:  No carotid bruits.  No thyromegaly.

LUNGS:  Clear to auscultation bilaterally.

CARDIAC:  Normal cardiac rhythm and sounds.

ABDOMEN:  Soft, nontender.

EXTREMITIES:  No cyanosis, clubbing or edema.

NEUROLOGIC:  He is awake, alert and oriented x2 with fluent speech, slightly low volume.  He also spe
aks fast.  Cranial nerve examination shows intact visual fields bilaterally.  Pupils are reactive fro
m 3 to 2 mm bilaterally.  Extraocular movements are intact without nystagmus.  No definite facial wea
kness, but slightly asymmetrical face with right nasolabial fold flattening and slightly wider right 
palpebral fissure.  Preserved facial sensation.  Tongue is in midline.  Palate elevates symmetrically
.  Motor strength examination shows increased tone especially upper extremities with cogwheeling.  Th
ere is a mild weakness in the right upper and lower extremities about 4- or 3+/5.  Trace of pronator 
drift.  Sensory examination is grossly intact to light touch and pain.  Deep tendon reflexes are 2+ u
pper extremities, 1+ knee jerks, absent ankle jerks.  Equivocal response to plantar stimulation on th
e right and downgoing on the left.  Coordination is preserved on finger-to-finger testing on the left
.  Mild postural tremor noticed.  Gait was not assessed.

 

IMPRESSION:  Acute ischemic stroke in patient with risk factors of stroke and also urinary tract infe
ction.

 

Please keep patient normotensive, euglycemic.  We will start aspirin daily 81 mg per day as well as L
ipitor at least 40 mg at bedtime.  For patient's history of Parkinson disease, please restart his car
bidopa/levodopa 25/100 mg 3 times a day.  PT, OT, speech therapy are all pending.  Continue current t
reatment.  We will obtain MRI of the brain, echocardiogram as well as a carotid ultrasound.

 

The patient stated that yesterday he was able to swallow without any problems.

 

Thank you very much for this interesting consultation.

 

 

Dictated By: ALBINA CEDENO/ESPERANZA

DD:    01/23/2019 23:01:14

DT:    01/24/2019 01:20:19

Conf#: [id number]

DID#:  [id number]

CC: NAZ WIN MD;*EndCC*

## 2019-01-24 NOTE — NUR
EOSS: 

PT AO x 4, VSS, NSR on tele, right sided weakness, pt's family at bedside updated on poc.  
No acute events throughout shift. Will endorse accordingly to oncoming shift.

## 2019-01-24 NOTE — NUR
PT [facility]                   
                           

--------------------------------------------------------------------------------------------
----------------------------

Patient: Victoriano Banegas                               : 1940                    
Age/Sex:  78/M



Unit#: [id number]                                  Account#:  [id number]     Room/Bed:  
[facility]/B

--------------------------------------------------------------------------------------------
----------------------------





User: Jonathan Peñaloza PT                         Date: 19 09:45               Type: PT 
Technical Record

--------------------------------------------------------------------------------------------
----------------------------



Therapy day number 

1

Evaluation Start Time 

09:45

Evaluation Total Time 

0 min

Subjective 

Denies pain

Pain Scale

NUMERIC

Pain Intensity

0 (0-10)

Patient Stated Goal for Pain Relief 

0 (0-10)

Pain Level Comment 

denies pain 

Pre Treatment Vital Signs Stable 

Yes - 125/61, 93%O2 sats on RA, 101bpm

Exercise Assessment Label

Bilat Lower Extremity

Exercise Type

Active ROM

Additional Exercise Comments 

semi-supine APs, heel slides, SLR 

Supine to Sit 

Moderate Assist

Transfer Sit to Stand Ability

Maximum Assist

Bed Mobility Sit to Supine

Dependent

Sitting Tolerance

18 min

Additional Mobility Comments 

dependent for repositioning in bed 

Patient uses wheelchair 

Yes

Additional Gait Comments 

TBA; unsafe to initiate at this time 

Static Sitting Balance 

Fair plus

Dynamic Sitting Balance 

Fair

Standing Static Balance 

Poor

Additional Balance Assessments Comments 

with therapist guarding in front; unable to stand with using FWW

Safety Judgement 

Fair

Activity Tolerance 

Good

Equipment Present 

A pump

Mcqueen Catheter

IV pump

Post Treatment Pain Intensity 

0 0-10

Variance Documentation 

SEE PT EVAL

PT Technical Record Comment 

PT EVAL



Pt is a 77 yo M with PMH of DM, Parkinson's disease who presented with 

sudden onset of R-sided weakness. Brain imaging revealed "Atrophy. White 

matter disease compatible with chronic small vessel ischemia. Chronic 

lacunar infarcts left caudate nucleus and left frontal corona radiata. 

Left basal ganglia infarct of unknown chronicity." MRI pending. Pt 

received in 6W, telemetry.



Precautions: fall precautions



PLOF: Per pt, pt lives with wife in [facility] with 2-3 steps to enter c B 

railing. Ambulatory with [facility]. Pt has caregiver assistance for 6-7 hours a 

day, assisting with ADLs; apparently caregiver also assists pt's wife. Pt 

owns a FWW and manual wheelchair.



CLOF: MARTHA Thrasher cleared pt for PT evaluation. Pt received semi-supine in 

bed, vitals assessed and stable, agreeable to PT evaluation. Noted with 

strength/ROM assessment, generalized weakness overall though with greater 

deficits on R-side (grossly 2/5); also noted rigidity with PROM 

throughout. Bed mobility, and transfer assessment as described above. Sit 

to stand attempted x 3, pt unable to stand using FWW alone, unable to 

achieve full standing with PT maxA guarding from front, noting significant 

B knee buckling, severe retropulsion. Pt returned to bed, all needs in 

reach, no signs of distress, bed alarm activated. RN notified of pt's 

status



Recommendation: Pt presents with generalized weakness with significant 

weakness on R compared to L and notable rigidity throughout B UE and LE. 

Pt presents as very high fall risk with noted B knee buckling and 

retropulsion when in standing, unable to achieve full up right posture. Pt 

will benefit from additional skilled PT during hospital stay for further 

mobility training. D/c recommendation to post-acute setting ([facility] vs SNF) 

once cleared by MD to address mobility deficits and maximize independence 

before returning home. 



P: Continue c PT POC (Daily x6); recommend 2 person assist and platform 

walker for transfer training as tolerated

## 2019-01-25 VITALS — HEART RATE: 99 BPM

## 2019-01-25 VITALS — DIASTOLIC BLOOD PRESSURE: 71 MMHG | RESPIRATION RATE: 18 BRPM | HEART RATE: 96 BPM | SYSTOLIC BLOOD PRESSURE: 133 MMHG

## 2019-01-25 VITALS — SYSTOLIC BLOOD PRESSURE: 140 MMHG | HEART RATE: 95 BPM | RESPIRATION RATE: 20 BRPM | DIASTOLIC BLOOD PRESSURE: 74 MMHG

## 2019-01-25 VITALS — HEART RATE: 95 BPM | RESPIRATION RATE: 17 BRPM | SYSTOLIC BLOOD PRESSURE: 138 MMHG | DIASTOLIC BLOOD PRESSURE: 72 MMHG

## 2019-01-25 VITALS — HEART RATE: 91 BPM | SYSTOLIC BLOOD PRESSURE: 141 MMHG | RESPIRATION RATE: 21 BRPM | DIASTOLIC BLOOD PRESSURE: 71 MMHG

## 2019-01-25 VITALS — HEART RATE: 95 BPM

## 2019-01-25 VITALS — RESPIRATION RATE: 19 BRPM | HEART RATE: 94 BPM | SYSTOLIC BLOOD PRESSURE: 133 MMHG | DIASTOLIC BLOOD PRESSURE: 69 MMHG

## 2019-01-25 VITALS — HEART RATE: 94 BPM | SYSTOLIC BLOOD PRESSURE: 125 MMHG | RESPIRATION RATE: 20 BRPM | DIASTOLIC BLOOD PRESSURE: 72 MMHG

## 2019-01-25 VITALS — HEART RATE: 81 BPM

## 2019-01-25 VITALS — SYSTOLIC BLOOD PRESSURE: 140 MMHG | HEART RATE: 98 BPM | RESPIRATION RATE: 20 BRPM | DIASTOLIC BLOOD PRESSURE: 72 MMHG

## 2019-01-25 VITALS — HEART RATE: 93 BPM

## 2019-01-25 VITALS — HEART RATE: 98 BPM

## 2019-01-25 VITALS — HEART RATE: 92 BPM

## 2019-01-25 RX ADMIN — CARBIDOPA AND LEVODOPA 1 TAB: 25; 100 TABLET ORAL at 12:55

## 2019-01-25 RX ADMIN — CARBIDOPA AND LEVODOPA 1 TAB: 25; 100 TABLET ORAL at 21:09

## 2019-01-25 RX ADMIN — ATORVASTATIN CALCIUM SCH MG: 40 TABLET, FILM COATED ORAL at 21:09

## 2019-01-25 RX ADMIN — FAMOTIDINE SCH MG: 20 TABLET ORAL at 21:09

## 2019-01-25 RX ADMIN — FAMOTIDINE 1 MG: 10 INJECTION, SOLUTION INTRAVENOUS at 08:00

## 2019-01-25 RX ADMIN — CEFTRIAXONE SCH MLS/HR: 1 INJECTION, SOLUTION INTRAVENOUS at 16:09

## 2019-01-25 RX ADMIN — CARBIDOPA AND LEVODOPA 1 TAB: 25; 100 TABLET ORAL at 08:00

## 2019-01-25 RX ADMIN — INSULIN ASPART 1 UNIT: 100 INJECTION, SOLUTION INTRAVENOUS; SUBCUTANEOUS at 18:24

## 2019-01-25 RX ADMIN — CARBIDOPA AND LEVODOPA SCH TAB: 25; 100 TABLET ORAL at 12:55

## 2019-01-25 RX ADMIN — DIPHENHYDRAMINE HYDROCHLORIDE SCH MG: 50 INJECTION, SOLUTION INTRAMUSCULAR; INTRAVENOUS at 08:00

## 2019-01-25 RX ADMIN — INSULIN ASPART 1 UNIT: 100 INJECTION, SOLUTION INTRAVENOUS; SUBCUTANEOUS at 08:00

## 2019-01-25 RX ADMIN — ATORVASTATIN CALCIUM 1 MG: 40 TABLET, FILM COATED ORAL at 21:09

## 2019-01-25 RX ADMIN — CARBIDOPA AND LEVODOPA SCH TAB: 25; 100 TABLET ORAL at 21:09

## 2019-01-25 RX ADMIN — CARBIDOPA AND LEVODOPA SCH TAB: 25; 100 TABLET ORAL at 08:00

## 2019-01-25 RX ADMIN — INSULIN ASPART 1 UNIT: 100 INJECTION, SOLUTION INTRAVENOUS; SUBCUTANEOUS at 21:27

## 2019-01-25 RX ADMIN — INSULIN ASPART 1 UNIT: 100 INJECTION, SOLUTION INTRAVENOUS; SUBCUTANEOUS at 12:03

## 2019-01-25 RX ADMIN — CEFTRIAXONE 1 MLS/HR: 1 INJECTION, SOLUTION INTRAVENOUS at 16:09

## 2019-01-25 RX ADMIN — INSULIN GLARGINE SCH UNITS: 100 INJECTION, SOLUTION SUBCUTANEOUS at 21:23

## 2019-01-25 RX ADMIN — DEXTROSE, SODIUM CHLORIDE, AND POTASSIUM CHLORIDE 1 MLS/HR: 5; .45; .15 INJECTION INTRAVENOUS at 11:03

## 2019-01-25 RX ADMIN — INSULIN ASPART 1 UNIT: 100 INJECTION, SOLUTION INTRAVENOUS; SUBCUTANEOUS at 08:53

## 2019-01-25 RX ADMIN — DEXTROSE, SODIUM CHLORIDE, AND POTASSIUM CHLORIDE SCH MLS/HR: 5; .45; .15 INJECTION INTRAVENOUS at 11:03

## 2019-01-25 RX ADMIN — FAMOTIDINE 1 MG: 20 TABLET ORAL at 21:09

## 2019-01-25 RX ADMIN — INSULIN GLARGINE 1 UNITS: 100 INJECTION, SOLUTION SUBCUTANEOUS at 21:23

## 2019-01-25 NOTE — NUR
PT NOTE



                                              [facility]                   
                           

--------------------------------------------------------------------------------------------
----------------------------

Patient: Victoriano Banegas                               : 1940                    
Age/Sex:  78/M



Unit#: [id number]                                  Account#:  [id number]     [facility]

--------------------------------------------------------------------------------------------
----------------------------





User: Jonathan Peñaloza PT                         Date: 19 10:10               Type: PT 
Technical Record

--------------------------------------------------------------------------------------------
----------------------------



Therapy day number 

2

Subjective 

Denies pain

Pain Scale

NUMERIC

Pain Intensity

6 (0-10)

Patient Stated Goal for Pain Relief 

0 (0-10)

Pain Level Comment 

neck pain, R shoulder pain

Pre Treatment Vital Signs Stable 

Yes

Exercise Assessment Label

Bilat Lower Extremity

Exercise Type

Active ROM

Additional Exercise Comments 

semi-supine APs, heel slides, bridging

Exercise Start Time 

10:10

Exercise End Time 

10:25

Total Exercise Time 

15 min (8-127)

Transfer Training Start Time 

10:25

Supine to Sit 

Moderate Assist

Transfer Sit to Stand Ability

Maximum Assist

Bed Mobility Sit to Supine

Maximum Assist

Sitting Tolerance

15 min

Additional Mobility Comments 

STS maxA x 2, blocking B knees; sitting>supine maxA x 2

Transfer Training End Time 

10:49

Total Transfer Training Time 

24 min (8-127)

Additional Gait Comments 

TBA

Static Sitting Balance 

Fair plus

Dynamic Sitting Balance 

Fair

Standing Static Balance 

Poor

Additional Balance Assessments Comments 

with FWW and 2PA

Safety Judgement 

Poor

Activity Tolerance 

Fair

Equipment Present 

A pump

Mcqueen Catheter

IV pump

Post Treatment Pain Intensity 

0 0-10

Variance Documentation 

SEE PT NOTE

Total Treament Time 

39 min (8-127)

Total Minutes 

39

Total Units 

3

PT Technical Record Comment 

PT NOTE



S: Pt agreeable to PT session, reports neck pain and R shoulder pain; 

forgetful at times



O: MARTHA Thrasher cleared pt for PT session. Pt received semi-supine in bed, 

vitals assessed and stable. Pt participated in interventions above. 

Continue to note pt with generalized weakness though R side more limited 

than L. With sit to stand attempts with 2 person assist, continue to note 

pt with B knee buckling, difficulty with hip extension in standing with 

retropulsion, unable to achieve full up right posture despite maxA. Condom 

catheter dislodged with mobility, RN aware. Pt returned to bed, all needs 

in reach, no signs of distress, bed alarm activated, family at bedside. RN 

notified of pt's status.



A: Generalized weakness, more pronounced on R, continues to limit pt's 

mobility. Pt unable to maintain static standing without maxA x 2 person 

assist due to retropulsion, rigidity, and difficulty shifting weight 

anteriorly.



P: Continue c PT POC

## 2019-01-25 NOTE — CONS
Consult Date/Type/Reason


Admit Date/Time


Jan 23, 2019 at 14:08


Initial Consult Date





Type of Consultation:  neuro


Date/Time of Note


DATE: 1/25/19 


TIME: 00:15





Subjective


Feels better, less pain RUE





Objective


Vitals





Vital Signs


  Date      Temp  Pulse  Resp  B/P (MAP)   Pulse Ox  O2          O2 Flow    FiO2


Time                                                 Delivery    Rate


   1/25/19  98.2     94    19      133/69        93


     00:06                           (90)


   1/24/19                                           Room Air


     04:00








Intake and Output





1/24/19 1/24/19 1/25/19





1515:00


23:00


07:00





IntakeIntake Total


1150 ml





OutputOutput Total


800 ml





BalanceBalance


350 ml














Results/Medications


Result Diagram:  


1/24/19 0514                                                                    


           1/24/19 0514





Results 24 hrs





Laboratory Tests


Test
                 1/24/19
05:07  1/24/19
05:14  1/24/19
11:58  1/24/19
17:22


Bedside Glucose               211                          240  H         236  H


White Blood Count                          11.8  H


Red Blood Count                            4.37  L


Hemoglobin                                 13.4  L


Hematocrit                                 39.8  L


Mean Corpuscular                            91.1


Volume


Mean Corpuscular                            30.7


Hemoglobin


Mean Corpuscular      
                    33.7  
  
              



Hemoglobin
Concent


Red Cell                                    12.0


Distribution Width


Platelet Count                               377


Mean Platelet Volume                         8.7


Immature                                  0.700  H


Granulocytes %


Neutrophils %                               77.0


Lymphocytes %                               7.9  L


Monocytes %                                11.7  H


Eosinophils %                                2.2


Basophils %                                  0.5


Nucleated Red Blood                          0.0


Cells %


Immature                                  0.080  H


Granulocytes #


Neutrophils #                               9.1  H


Lymphocytes #                                0.9


Monocytes #                                 1.4  H


Eosinophils #                                0.3


Basophils #                                  0.1


Nucleated Red Blood                          0.0


Cells #


Sodium Level                                 137


Potassium Level                              4.4


Chloride Level                                98


Carbon Dioxide Level                          24


Anion Gap                                    15  H


Blood Urea Nitrogen                           20


Creatinine                                  0.68


Est Glomerular        
                
            
              



Filtrat Rate
mL/min


Glucose Level                                220


Hemoglobin A1c                              8.1  H


Calcium Level                                9.3


Magnesium Level                              1.9


Total Bilirubin                              0.5


Direct Bilirubin                            0.00


Indirect Bilirubin                           0.5


Aspartate Amino       
                      22  
  
              



Transf
(AST/SGOT)


Alanine               
                      23  
  
              



Aminotransferase
(AL


T/SGPT)


Alkaline Phosphatase                          63


Total Protein                                7.1


Albumin                                      3.5


Globulin                                   3.60  H


Albumin/Globulin                            0.97


Ratio


Triglycerides Level                           91


Cholesterol Level                            134


LDL Cholesterol,                              91


Calculated


HDL Cholesterol                              25  L


Cholesterol/HDL                              5.3


Ratio


Test
                 1/24/19
20:37  
              
              



Bedside Glucose              286  H





Home Meds


Reported Medications


Metformin* (Glucophage*) 500 Mg Tab, 500 MG PO BID, #90 TAB


   1/23/19


Carbidopa/Levodopa (CARBIDOPA-LEVO  MG ODT) 1 Each Tab.rapdis, 1 TAB PO 


TID, #90 TAB


   1/23/19


Discontinued Reported Medications


Metformin  


   7/3/11


Glyburide  


   7/3/11


Medications





Current Medications


Potassium Chloride/Dextrose/ Sod Cl 1,000 ml @  70 mls/hr [id number] IV  Last 


administered on 1/24/19at 06:50; Admin Dose 70 MLS/HR;  Start 1/23/19 at 16:04


Ondansetron HCl (Zofran Inj) 4 mg Q6H  PRN IV NAUSEA;  Start 1/23/19 at 16:30


Morphine Sulfate (morphine) 2 mg Q4H  PRN IV PAIN Last administered on 1/24/19at


06:59; Admin Dose 2 MG;  Start 1/23/19 at 16:30


Famotidine (Pepcid Iv) 20 mg Q12 IV  Last administered on 1/24/19at 20:44; Admin


Dose 20 MG;  Start 1/23/19 at 21:00


Ceftriaxone Sodium 50 ml @  100 mls/hr Q24H IVPB  Last administered on 1/24/19at


16:38; Admin Dose 100 MLS/HR;  Start 1/23/19 at 17:00


Miscellaneous Information 1 ea NOTE XX ;  Start 1/23/19 at 17:00


Glucose (Glutose) 15 gm Q15M  PRN PO DECREASED GLUCOSE;  Start 1/23/19 at 17:00


Glucose (Glutose) 22.5 gm Q15M  PRN PO DECREASED GLUCOSE;  Start 1/23/19 at 


17:00


Dextrose (D50w Syringe) 25 ml Q15M  PRN IV DECREASED GLUCOSE;  Start 1/23/19 at 


17:00


Dextrose (D50w Syringe) 50 ml Q15M  PRN IV DECREASED GLUCOSE;  Start 1/23/19 at 


17:00


Glucagon (Glucagen) 1 mg Q15M  PRN IM DECREASED GLUCOSE;  Start 1/23/19 at 17:00


Glucose (Glutose) 15 gm Q15M  PRN BUCCAL DECREASED GLUCOSE;  Start 1/23/19 at 


17:00


Miscellaneous Information (*Order Clarification Bulletin) ***MEDICATION REQUIRES


CLARIFICATI... Q8H XX  Last administered on 1/23/19at 17:41; Admin Dose 1 EA;  


Start 1/23/19 at 17:30


Atorvastatin Calcium (Lipitor) 40 mg HS PO  Last administered on 1/24/19at 


20:44; Admin Dose 40 MG;  Start 1/24/19 at 21:00


Carbidopa/Levodopa (Sinemet (25/ 100)) 1 tab TID PO  Last administered on 


1/24/19at 20:44; Admin Dose 1 TAB;  Start 1/24/19 at 09:00


Insulin Aspart (Novolog Insulin Pen) NOVOLOG *MILD* ALGORITHM WITH MEALS  


BEDTIME SC  Last administered on 1/24/19at 20:59; Admin Dose 3 UNIT;  Start 


1/24/19 at 20:41


Metformin HCl (Glucophage) 500 mg BID WITH  MEALS PO ;  Start 1/25/19 at 08:00


Diagnostic Test (Pha) (Accu-Chek) 1 ea 02 XX ;  Start 1/25/19 at 02:00





Assessment/Plan


Hospital Course (Demo Recall)


NEUROLOGIC:  He is awake, alert and oriented x2 with fluent speech, slightly low


volume.  Cranial nerve examination shows intact visual fields bilaterally.  


Pupils are reactive from 3 to 2 mm bilaterally.  Extraocular movements are 


intact without nystagmus.  No definite facial weakness, but slightly 


asymmetrical face with right nasolabial fold flattening and slightly wider right


palpebral fissure.  Preserved facial sensation.  Tongue is in midline.  Palate 


elevates symmetrically.  Motor strength examination shows increased tone 


especially upper extremities with cogwheeling.  There is a mild weakness in the 


right upper and lower extremities about 4- or 3+/5, better RLE.  Trace of 


pronator drift.  Sensory examination is grossly intact to light touch and pain. 


Deep tendon reflexes are 2+ upper extremities, 1+ knee jerks, absent ankle 


jerks.  Equivocal response to plantar stimulation on the right and downgoing on 


the left.  Coordination is preserved on finger-to-finger testing on the left.  


Mild postural tremor noticed.  Gait was not assessed.


 


IMPRESSION:  Acute ischemic stroke in patient with risk factors of stroke.


 


Please keep patient normotensive, euglycemic.  Continue ASA, lipitor, sinemet.  


PT, OT, speech therapy.  .











ALBIAN ELLISON MD 25, 2019 00:17

## 2019-01-25 NOTE — NUR
OT EVAL: 

          Pt is a 79 yo M with PMH of DM, Parkinson's disease who presented with 

sudden onset of R-sided weakness. Brain imaging revealed "Atrophy. White 

matter disease compatible with chronic small vessel ischemia. Chronic 

lacunar infarcts left caudate nucleus and left frontal corona radiata. 

Left basal ganglia infarct of unknown chronicity." MRI pending. Pt 

received in 6W, telemetry.



Precautions: fall precautions



PLOF: Per wife pt lives in a SSH. Pt was independent with ADl's and did not use any DME.



CLOF: MARTHA Thrasher cleared pt for OT evaluation. Pt received supine in bed and agreeable to tx. 
Pt AOx2 (person, place) stated 0/10 pain and demonstrated limited ROM and increased tone on 
RUE. Pt presents with aphagia, having a difficult time expressing himself.Pt required Max 
Ax2 to perform supine->sit at EOB. Seated at EOB pt demonstrated F- balance requiring min a 
to sit upright as pt tends to fall to the right. OTR provided pt with toothbrush and 
toothpaste and instructed pt to brush teeth. OTR educated pt on holding toothbrush in weaker 
arm (right arm) and using stronger arm (left) to squeeze toothpaste. Pt presented with 
impaired depth perception evidenced by having a difficult time applying the toothpaste onto 
toothbrush. Pt required Mod A to complete task with vc for sequencing. OTR proceeded to 
educate pt on maikol dressing techniques. Pt expressed understanding and donned and doffed 
gown with MOD A and verbal cueing for sequencing. Finally OTR provided family and pt on 
AAROM exercises for pts right arm. Pt completed exercises with good understanding. Pt 
returned to supine in bed with Max Ax2. Pt left with all needs met. RN notified. 

 

Pt will benefit from skilled OT tx. 1x daily for 3-5 x week to increase strength, endurance, 
safety awareness, ROM and self care tasks. Recommend d/c to ARU when medically cleared by 
MD. 

 

 

 Pt received semi-supine in 

bed, vitals assessed and stable, agreeable to PT evaluation. Noted with 

strength/ROM assessment, generalized weakness overall though with greater 

deficits on R-side (grossly 2/5); also noted rigidity with PROM 

throughout. Bed mobility, and transfer assessment as described above. Sit 

to stand attempted x 3, pt unable to stand using FWW alone, unable to 

achieve full standing with PT maxA guarding from front, noting significant 

B knee buckling, severe retropulsion. Pt returned to bed, all needs in 

reach, no signs of distress, bed alarm activated. RN notified of pt's 

status



Recommendation: Pt presents with generalized weakness with significant 

weakness on R compared to L and notable rigidity throughout B UE and LE. 

Pt presents as very high fall risk with noted B knee buckling and 

retropulsion when in standing, unable to achieve full up right posture. Pt 

will benefit from additional skilled PT during hospital stay for further 

mobility training. D/c recommendation to post-acute setting (ARU vs SNF) 

once cleared by MD to address mobility deficits and maximize independence 

before returning home. 



P: Continue c PT POC (Daily x6); recommend 2 person assist and platform 

walker for transfer training as tolerated

## 2019-01-25 NOTE — NUR
EOSS: 

PT AO x 3, VSS, NSR on tele, right sided weakness, pt's family at bedside updated on poc.  
No acute events throughout shift. BG consistently in 220 range. Diabetic educator consult 
requested. Will endorse accordingly to oncoming shift.

## 2019-01-25 NOTE — NUR
Pt A/O x 3. VS stable, SR,  right sided weakness, denied pain. Last blood sugar was 190 at 
02 am. Change accu check to achs with mild coverage. Metformin home med order. No acute 
events throughout shift. Continue care plan.

## 2019-01-26 VITALS — DIASTOLIC BLOOD PRESSURE: 80 MMHG | HEART RATE: 92 BPM | RESPIRATION RATE: 19 BRPM | SYSTOLIC BLOOD PRESSURE: 137 MMHG

## 2019-01-26 VITALS — HEART RATE: 90 BPM

## 2019-01-26 VITALS — HEART RATE: 87 BPM | RESPIRATION RATE: 20 BRPM | DIASTOLIC BLOOD PRESSURE: 78 MMHG | SYSTOLIC BLOOD PRESSURE: 150 MMHG

## 2019-01-26 VITALS — HEART RATE: 89 BPM

## 2019-01-26 VITALS — RESPIRATION RATE: 19 BRPM | SYSTOLIC BLOOD PRESSURE: 149 MMHG | HEART RATE: 87 BPM | DIASTOLIC BLOOD PRESSURE: 71 MMHG

## 2019-01-26 VITALS — HEART RATE: 89 BPM | DIASTOLIC BLOOD PRESSURE: 84 MMHG | SYSTOLIC BLOOD PRESSURE: 145 MMHG | RESPIRATION RATE: 20 BRPM

## 2019-01-26 VITALS — HEART RATE: 96 BPM

## 2019-01-26 VITALS — RESPIRATION RATE: 18 BRPM | DIASTOLIC BLOOD PRESSURE: 68 MMHG | HEART RATE: 84 BPM | SYSTOLIC BLOOD PRESSURE: 101 MMHG

## 2019-01-26 VITALS — HEART RATE: 83 BPM

## 2019-01-26 RX ADMIN — CARBIDOPA AND LEVODOPA 1 TAB: 25; 100 TABLET ORAL at 11:56

## 2019-01-26 RX ADMIN — MORPHINE SULFATE PRN MG: 10 SOLUTION ORAL at 20:05

## 2019-01-26 RX ADMIN — DOCUSATE SODIUM SCH MG: 100 CAPSULE, LIQUID FILLED ORAL at 14:32

## 2019-01-26 RX ADMIN — INSULIN ASPART 1 UNIT: 100 INJECTION, SOLUTION INTRAVENOUS; SUBCUTANEOUS at 20:23

## 2019-01-26 RX ADMIN — FAMOTIDINE SCH MG: 20 TABLET ORAL at 20:05

## 2019-01-26 RX ADMIN — CARBIDOPA AND LEVODOPA SCH TAB: 25; 100 TABLET ORAL at 20:05

## 2019-01-26 RX ADMIN — INSULIN ASPART 1 UNIT: 100 INJECTION, SOLUTION INTRAVENOUS; SUBCUTANEOUS at 18:00

## 2019-01-26 RX ADMIN — INSULIN GLARGINE SCH UNITS: 100 INJECTION, SOLUTION SUBCUTANEOUS at 20:23

## 2019-01-26 RX ADMIN — CARBIDOPA AND LEVODOPA 1 TAB: 25; 100 TABLET ORAL at 07:53

## 2019-01-26 RX ADMIN — CARBIDOPA AND LEVODOPA SCH TAB: 25; 100 TABLET ORAL at 07:53

## 2019-01-26 RX ADMIN — DEXTROSE, SODIUM CHLORIDE, AND POTASSIUM CHLORIDE 1 MLS/HR: 5; .45; .15 INJECTION INTRAVENOUS at 01:21

## 2019-01-26 RX ADMIN — DOCUSATE SODIUM 1 MG: 100 CAPSULE, LIQUID FILLED ORAL at 14:32

## 2019-01-26 RX ADMIN — FAMOTIDINE 1 MG: 20 TABLET ORAL at 07:53

## 2019-01-26 RX ADMIN — CARBIDOPA AND LEVODOPA 1 TAB: 25; 100 TABLET ORAL at 20:05

## 2019-01-26 RX ADMIN — INSULIN ASPART 1 UNIT: 100 INJECTION, SOLUTION INTRAVENOUS; SUBCUTANEOUS at 12:06

## 2019-01-26 RX ADMIN — FAMOTIDINE 1 MG: 20 TABLET ORAL at 20:05

## 2019-01-26 RX ADMIN — ATORVASTATIN CALCIUM 1 MG: 40 TABLET, FILM COATED ORAL at 20:05

## 2019-01-26 RX ADMIN — CEFTRIAXONE SCH MLS/HR: 1 INJECTION, SOLUTION INTRAVENOUS at 17:21

## 2019-01-26 RX ADMIN — INSULIN ASPART 1 UNIT: 100 INJECTION, SOLUTION INTRAVENOUS; SUBCUTANEOUS at 08:13

## 2019-01-26 RX ADMIN — INSULIN ASPART 1 UNIT: 100 INJECTION, SOLUTION INTRAVENOUS; SUBCUTANEOUS at 17:21

## 2019-01-26 RX ADMIN — DEXTROSE, SODIUM CHLORIDE, AND POTASSIUM CHLORIDE SCH MLS/HR: 5; .45; .15 INJECTION INTRAVENOUS at 15:56

## 2019-01-26 RX ADMIN — INSULIN ASPART 1 UNIT: 100 INJECTION, SOLUTION INTRAVENOUS; SUBCUTANEOUS at 18:04

## 2019-01-26 RX ADMIN — MORPHINE SULFATE 1 MG: 10 SOLUTION ORAL at 20:05

## 2019-01-26 RX ADMIN — DOCUSATE SODIUM SCH MG: 100 CAPSULE, LIQUID FILLED ORAL at 20:05

## 2019-01-26 RX ADMIN — DEXTROSE, SODIUM CHLORIDE, AND POTASSIUM CHLORIDE SCH MLS/HR: 5; .45; .15 INJECTION INTRAVENOUS at 01:21

## 2019-01-26 RX ADMIN — MORPHINE SULFATE PRN MG: 10 SOLUTION ORAL at 15:57

## 2019-01-26 RX ADMIN — INSULIN GLARGINE 1 UNITS: 100 INJECTION, SOLUTION SUBCUTANEOUS at 20:23

## 2019-01-26 RX ADMIN — CEFTRIAXONE 1 MLS/HR: 1 INJECTION, SOLUTION INTRAVENOUS at 17:21

## 2019-01-26 RX ADMIN — DOCUSATE SODIUM 1 MG: 100 CAPSULE, LIQUID FILLED ORAL at 20:05

## 2019-01-26 RX ADMIN — CARBIDOPA AND LEVODOPA SCH TAB: 25; 100 TABLET ORAL at 11:56

## 2019-01-26 RX ADMIN — FAMOTIDINE SCH MG: 20 TABLET ORAL at 07:53

## 2019-01-26 RX ADMIN — DEXTROSE, SODIUM CHLORIDE, AND POTASSIUM CHLORIDE 1 MLS/HR: 5; .45; .15 INJECTION INTRAVENOUS at 15:56

## 2019-01-26 RX ADMIN — ATORVASTATIN CALCIUM SCH MG: 40 TABLET, FILM COATED ORAL at 20:05

## 2019-01-26 RX ADMIN — MORPHINE SULFATE 1 MG: 10 SOLUTION ORAL at 15:57

## 2019-01-26 NOTE — NUR
ST NOTE: pt seen for f/up after evaluation; tolerating diet; needs liquid wash down to 
clear. pt afebrile;

## 2019-01-26 NOTE — NUR
PT AO x 3, VS Stable, NSR on tele, right sided weakness, .  No acute events throughout 
shift. last BG  232 at 02 am. Morphine given x1 for shoulder pain. Will endorse accordingly 
to oncoming shift.

## 2019-01-26 NOTE — NUR
NURSE NOTE



pt c/o 8/10 pain on his right arm radiating to his right shoulder

notified Dr. Hess 

he states there is already a shoulder xray that was done, no further interventions at this 
time 

continuing to monitor 

-------------------------------------------------------------------------------

Addendum: 01/26/19 at 1213 by ABI DOWNING RN

-------------------------------------------------------------------------------

pain medication given as requested

## 2019-01-26 NOTE — NUR
PT NOTE



Therapy day number 

3

Subjective 

Current complaint of pain

Pain Scale

NUMERIC

Pain Intensity

5 (0-10)

Patient Stated Goal for Pain Relief 

0 (0-10)

Pain Level Comment 

neck pain, R shoulder pain

Exercise Assessment Label

Bilat Lower Extremity

Exercise Type

Active Assist ROM

Additional Exercise Comments 

EOB: AP's, knee flex/ext, marches

Exercise Start Time 

11:15

Exercise End Time 

11:30

Total Exercise Time 

15 min (8-127)

Transfer Training Start Time 

11:30

Supine to Sit 

Maximum Assist

Transfer Sit to Stand Ability

Maximum Assist

Bed Mobility Sit to Supine

Maximum Assist

Sitting Tolerance

20 min

Additional Mobility Comments 

STS maxA x 3, blocking B knees; sitting>supine maxA x 2

Transfer Training End Time 

11:58

Total Transfer Training Time 

28 min (8-127)

Patient uses wheelchair 

Yes

Static Sitting Balance 

Fair plus

Dynamic Sitting Balance 

Fair

Standing Static Balance 

Poor

Additional Balance Assessments Comments 

with FWW and 2PA

Safety Judgement 

Poor

Activity Tolerance 

Fair

Equipment Present 

A pump

Mcqueen Catheter

IV pump

Post Treatment Pain Intensity 

3 0-10

Variance Documentation 

SEE BELOW AND PT NOTE

Total Treament Time 

43 min (8-127)

Total Minutes 

43

Total Units 

3

PT Technical Record Comment 

PT NOTE



S: Pt stated, "My neck really hurts and my right shoulder." Agreeable for 

PT and cleared per MARTHA Cooley.

O: Received pt in semi-fowlers, alert w/family present in room. Bed 

mobility w/HOB elevated using BR w/VCs/TCs for hand placement & sequence 

MaxA x 2. Applied gait belt at EOB. Pt performed AAROM BLE thera ex, see 

above for exercises, 2 sets x 5 per exercise. Pt then performed 

static/dynamic sitting balance exercises: weight shifitng. Noted pt would 

lean to R side, therefore required VCs to self correct to the center 

w/Gordo/CGA occasionally. Tolerated sitting ~20 minutes. Attempted STS x 3 

w/FWW MaxA x 2 w/VCs/TCs for proper hand/foot placement & sequence and (B) 

knee blocking. Noted pt unable to  full erect position, B knee 

buckling, difficulty with hip extension in standing, and with retropulsion 

despite Max VCs/TCs. Pt expressed unable to stand due to fear of falling 

and general weakness. Assisted pt BTB MaxA x 2. Positioned pt for comfort, 

call light/phone within reach, bed alarmed, SCD's reapplied, and all needs 

met. MARTHA Cooley informed of pt's status.

A: Fair tolerance to tx. Pt showed no signs of distress or SOB during or 

after tx. No c/o dizziness or nausea throughout tx. Sitting tolerance 

improved compared to previous tx. Pt continues to require 2PA due to 

generalized weakness.

P: Continue POC and progress as tolerated.

## 2019-01-26 NOTE — PN
Date/Time of Note


Date/Time of Note


DATE: 1/26/19 


TIME: 11:35





Assessment/Plan


VTE Prophylaxis


Risk score (from Ns)>0 risk:  6


SCD applied (from Ns):  Yes


Pharmacological prophylaxis:  LMWH





Lines/Catheters


IV Catheter Type (from Nrs):  Peripheral IV


Urinary Cath still in place:  No





Assessment/Plan


Hospital Course


-Acute stroke with right-sided weakness.  Continue aspirin Lipitor.  Continue PT


and OT.   Dr. Braden is following  in neurology consultation.  


-Gram-negative rods UTI, continue Rocephin.  


-Diabetes mellitus type 2 with hemoglobin A1c 8.1.  


-Parkinson's disease, continue Sinemet.


Result Diagram:  


1/24/19 0514                                                                    


           1/24/19 0514





Results 24hrs





Laboratory Tests


  Test
            1/25/19
11:59  1/25/19
17:19  1/25/19
21:08  1/26/19
02:17


  Bedside Glucose         255  H         229  H         262  H         234  H


  Test
            1/26/19
07:50  
              
              



  Bedside Glucose         240  H








Subjective


24 Hr Interval Summary


Free Text/Dictation


Patient have pain in neck and back. Patient is receiving physical therapy





Exam/Review of Systems


Exam


Vitals





Vital Signs


  Date      Temp  Pulse  Resp  B/P (MAP)   Pulse Ox  O2          O2 Flow    FiO2


Time                                                 Delivery    Rate


   1/26/19  98.2     89    20      145/84        98


     10:57                          (104)


   1/25/19                                           Room Air


     11:58








Intake and Output





1/25/19 1/25/19 1/26/19





1414:59


22:59


06:59





IntakeIntake Total


50 ml





BalanceBalance


50 ml











Constitutional:  well developed


Head:  normocephalic, atraumatic


Neck:  supple


Respiratory:  diminished breath sounds


Cardiovascular:  regular rate and rhythm


Gastrointestinal:  soft, non-tender


Extremities:  normal pulses





Results


Results 24hrs





Laboratory Tests


  Test
            1/25/19
11:59  1/25/19
17:19  1/25/19
21:08  1/26/19
02:17


  Bedside Glucose         255  H         229  H         262  H         234  H


  Test
            1/26/19
07:50  
              
              



  Bedside Glucose         240  H








Medications


Medication





Current Medications


Potassium Chloride/Dextrose/ Sod Cl 1,000 ml @  70 mls/hr [id number] IV  Last 


administered on 1/26/19at 01:21; Admin Dose 70 MLS/HR;  Start 1/23/19 at 16:04


Ondansetron HCl (Zofran Inj) 4 mg Q6H  PRN IV NAUSEA;  Start 1/23/19 at 16:30


Morphine Sulfate (morphine) 2 mg Q4H  PRN IV PAIN Last administered on 1/26/19at


08:01; Admin Dose 2 MG;  Start 1/23/19 at 16:30


Ceftriaxone Sodium 50 ml @  100 mls/hr Q24H IVPB  Last administered on 1/25/19at


16:09; Admin Dose 100 MLS/HR;  Start 1/23/19 at 17:00


Miscellaneous Information 1 ea NOTE XX ;  Start 1/23/19 at 17:00


Glucose (Glutose) 15 gm Q15M  PRN PO DECREASED GLUCOSE;  Start 1/23/19 at 17:00


Glucose (Glutose) 22.5 gm Q15M  PRN PO DECREASED GLUCOSE;  Start 1/23/19 at 


17:00


Dextrose (D50w Syringe) 25 ml Q15M  PRN IV DECREASED GLUCOSE;  Start 1/23/19 at 


17:00


Dextrose (D50w Syringe) 50 ml Q15M  PRN IV DECREASED GLUCOSE;  Start 1/23/19 at 


17:00


Glucagon (Glucagen) 1 mg Q15M  PRN IM DECREASED GLUCOSE;  Start 1/23/19 at 17:00


Glucose (Glutose) 15 gm Q15M  PRN BUCCAL DECREASED GLUCOSE;  Start 1/23/19 at 


17:00


Miscellaneous Information (*Order Clarification Bulletin) ***MEDICATION REQUIRES


CLARIFICATI... Q8H XX  Last administered on 1/23/19at 17:41; Admin Dose 1 EA;  


Start 1/23/19 at 17:30


Atorvastatin Calcium (Lipitor) 40 mg HS PO  Last administered on 1/25/19at 


21:09; Admin Dose 40 MG;  Start 1/24/19 at 21:00


Carbidopa/Levodopa (Sinemet (25/ 100)) 1 tab TID PO  Last administered on 


1/26/19at 07:53; Admin Dose 1 TAB;  Start 1/24/19 at 09:00


Insulin Aspart (Novolog Insulin Pen) NOVOLOG *MILD* ALGORITHM WITH MEALS  


BEDTIME SC  Last administered on 1/26/19at 08:13; Admin Dose 3 UNIT;  Start 


1/24/19 at 20:41


Metformin HCl (Glucophage) 500 mg BID WITH  MEALS PO  Last administered on 


1/26/19at 07:53; Admin Dose 500 MG;  Start 1/25/19 at 08:00


Diagnostic Test (Pha) (Accu-Chek) 1 ea 02 XX ;  Start 1/25/19 at 02:00


Famotidine (Pepcid) 20 mg Q12 PO  Last administered on 1/26/19at 07:53; Admin 


Dose 20 MG;  Start 1/25/19 at 21:00


Insulin Glargine (Lantus) 10 units DAILY@2000 SC  Last administered on 1/25/19at


21:23; Admin Dose 10 UNITS;  Start 1/25/19 at 20:30











DIPTI DOYLE                   Jan 26, 2019 11:35

## 2019-01-27 VITALS — RESPIRATION RATE: 20 BRPM | DIASTOLIC BLOOD PRESSURE: 70 MMHG | SYSTOLIC BLOOD PRESSURE: 141 MMHG | HEART RATE: 90 BPM

## 2019-01-27 VITALS — RESPIRATION RATE: 18 BRPM | DIASTOLIC BLOOD PRESSURE: 64 MMHG | SYSTOLIC BLOOD PRESSURE: 133 MMHG | HEART RATE: 85 BPM

## 2019-01-27 VITALS — SYSTOLIC BLOOD PRESSURE: 156 MMHG | RESPIRATION RATE: 20 BRPM | HEART RATE: 86 BPM | DIASTOLIC BLOOD PRESSURE: 88 MMHG

## 2019-01-27 VITALS — HEART RATE: 106 BPM

## 2019-01-27 VITALS — RESPIRATION RATE: 20 BRPM | DIASTOLIC BLOOD PRESSURE: 86 MMHG | SYSTOLIC BLOOD PRESSURE: 149 MMHG | HEART RATE: 94 BPM

## 2019-01-27 VITALS — HEART RATE: 102 BPM | DIASTOLIC BLOOD PRESSURE: 68 MMHG | RESPIRATION RATE: 20 BRPM | SYSTOLIC BLOOD PRESSURE: 117 MMHG

## 2019-01-27 VITALS — RESPIRATION RATE: 18 BRPM | HEART RATE: 82 BPM | SYSTOLIC BLOOD PRESSURE: 112 MMHG | DIASTOLIC BLOOD PRESSURE: 62 MMHG

## 2019-01-27 VITALS — RESPIRATION RATE: 18 BRPM | DIASTOLIC BLOOD PRESSURE: 75 MMHG | SYSTOLIC BLOOD PRESSURE: 130 MMHG | HEART RATE: 90 BPM

## 2019-01-27 VITALS — HEART RATE: 88 BPM

## 2019-01-27 VITALS — HEART RATE: 95 BPM

## 2019-01-27 VITALS — HEART RATE: 101 BPM

## 2019-01-27 VITALS — HEART RATE: 96 BPM

## 2019-01-27 VITALS — HEART RATE: 100 BPM

## 2019-01-27 LAB
ADD MAN DIFF?: NO
ANION GAP: 14 (ref 5–13)
BASOPHIL #: 0.1 10^3/UL (ref 0–0.1)
BASOPHILS %: 0.6 % (ref 0–2)
BLOOD UREA NITROGEN: 14 MG/DL (ref 7–20)
CALCIUM: 9.3 MG/DL (ref 8.4–10.2)
CARBON DIOXIDE: 28 MMOL/L (ref 21–31)
CHLORIDE: 92 MMOL/L (ref 97–110)
CREATININE: 0.62 MG/DL (ref 0.61–1.24)
EOSINOPHILS #: 0.3 10^3/UL (ref 0–0.5)
EOSINOPHILS %: 2.4 % (ref 0–7)
GLUCOSE: 232 MG/DL (ref 70–220)
HEMATOCRIT: 39.5 % (ref 42–52)
HEMOGLOBIN: 13.4 G/DL (ref 14–18)
IMMATURE GRANS #M: 0.05 10^3/UL (ref 0–0.03)
IMMATURE GRANS % (M): 0.5 % (ref 0–0.43)
LYMPHOCYTES #: 1.1 10^3/UL (ref 0.8–2.9)
LYMPHOCYTES %: 9.8 % (ref 15–51)
MEAN CORPUSCULAR HEMOGLOBIN: 30.5 PG (ref 29–33)
MEAN CORPUSCULAR HGB CONC: 33.9 G/DL (ref 32–37)
MEAN CORPUSCULAR VOLUME: 90 FL (ref 82–101)
MEAN PLATELET VOLUME: 8.4 FL (ref 7.4–10.4)
MONOCYTE #: 1.1 10^3/UL (ref 0.3–0.9)
MONOCYTES %: 10.5 % (ref 0–11)
NEUTROPHIL #: 8.3 10^3/UL (ref 1.6–7.5)
NEUTROPHILS %: 76.2 % (ref 39–77)
NUCLEATED RED BLOOD CELLS #: 0 10^3/UL (ref 0–0)
NUCLEATED RED BLOOD CELLS%: 0 /100WBC (ref 0–0)
PLATELET COUNT: 435 10^3/UL (ref 140–415)
POTASSIUM: 4.3 MMOL/L (ref 3.5–5.1)
RED BLOOD COUNT: 4.39 10^6/UL (ref 4.7–6.1)
RED CELL DISTRIBUTION WIDTH: 11.9 % (ref 11.5–14.5)
SODIUM: 134 MMOL/L (ref 135–144)
WHITE BLOOD COUNT: 10.9 10^3/UL (ref 4.8–10.8)

## 2019-01-27 RX ADMIN — CARBIDOPA AND LEVODOPA 1 TAB: 25; 100 TABLET ORAL at 21:18

## 2019-01-27 RX ADMIN — DEXTROSE, SODIUM CHLORIDE, AND POTASSIUM CHLORIDE 1 MLS/HR: 5; .45; .15 INJECTION INTRAVENOUS at 05:52

## 2019-01-27 RX ADMIN — DOCUSATE SODIUM SCH MG: 100 CAPSULE, LIQUID FILLED ORAL at 21:18

## 2019-01-27 RX ADMIN — CARBIDOPA AND LEVODOPA 1 TAB: 25; 100 TABLET ORAL at 08:08

## 2019-01-27 RX ADMIN — INSULIN ASPART 1 UNIT: 100 INJECTION, SOLUTION INTRAVENOUS; SUBCUTANEOUS at 08:18

## 2019-01-27 RX ADMIN — CARBIDOPA AND LEVODOPA 1 TAB: 25; 100 TABLET ORAL at 12:00

## 2019-01-27 RX ADMIN — CARBIDOPA AND LEVODOPA SCH TAB: 25; 100 TABLET ORAL at 12:00

## 2019-01-27 RX ADMIN — CEFTRIAXONE 1 MLS/HR: 1 INJECTION, SOLUTION INTRAVENOUS at 17:04

## 2019-01-27 RX ADMIN — INSULIN ASPART 1 UNIT: 100 INJECTION, SOLUTION INTRAVENOUS; SUBCUTANEOUS at 21:30

## 2019-01-27 RX ADMIN — FAMOTIDINE 1 MG: 20 TABLET ORAL at 08:08

## 2019-01-27 RX ADMIN — DEXTROSE, SODIUM CHLORIDE, AND POTASSIUM CHLORIDE SCH MLS/HR: 5; .45; .15 INJECTION INTRAVENOUS at 21:17

## 2019-01-27 RX ADMIN — MORPHINE SULFATE PRN MG: 10 SOLUTION ORAL at 15:42

## 2019-01-27 RX ADMIN — DEXTROSE, SODIUM CHLORIDE, AND POTASSIUM CHLORIDE SCH MLS/HR: 5; .45; .15 INJECTION INTRAVENOUS at 05:52

## 2019-01-27 RX ADMIN — MORPHINE SULFATE 1 MG: 10 SOLUTION ORAL at 11:11

## 2019-01-27 RX ADMIN — INSULIN ASPART 1 UNIT: 100 INJECTION, SOLUTION INTRAVENOUS; SUBCUTANEOUS at 17:23

## 2019-01-27 RX ADMIN — FAMOTIDINE 1 MG: 20 TABLET ORAL at 21:19

## 2019-01-27 RX ADMIN — ATORVASTATIN CALCIUM SCH MG: 40 TABLET, FILM COATED ORAL at 21:18

## 2019-01-27 RX ADMIN — CARBIDOPA AND LEVODOPA SCH TAB: 25; 100 TABLET ORAL at 21:18

## 2019-01-27 RX ADMIN — CEFTRIAXONE SCH MLS/HR: 1 INJECTION, SOLUTION INTRAVENOUS at 17:04

## 2019-01-27 RX ADMIN — ATORVASTATIN CALCIUM 1 MG: 40 TABLET, FILM COATED ORAL at 21:18

## 2019-01-27 RX ADMIN — FAMOTIDINE SCH MG: 20 TABLET ORAL at 08:08

## 2019-01-27 RX ADMIN — MORPHINE SULFATE PRN MG: 10 SOLUTION ORAL at 03:27

## 2019-01-27 RX ADMIN — DOCUSATE SODIUM SCH MG: 100 CAPSULE, LIQUID FILLED ORAL at 08:08

## 2019-01-27 RX ADMIN — MORPHINE SULFATE PRN MG: 10 SOLUTION ORAL at 11:11

## 2019-01-27 RX ADMIN — DEXTROSE, SODIUM CHLORIDE, AND POTASSIUM CHLORIDE 1 MLS/HR: 5; .45; .15 INJECTION INTRAVENOUS at 21:17

## 2019-01-27 RX ADMIN — MORPHINE SULFATE 1 MG: 10 SOLUTION ORAL at 15:42

## 2019-01-27 RX ADMIN — DOCUSATE SODIUM 1 MG: 100 CAPSULE, LIQUID FILLED ORAL at 08:08

## 2019-01-27 RX ADMIN — INSULIN GLARGINE 1 UNITS: 100 INJECTION, SOLUTION SUBCUTANEOUS at 21:31

## 2019-01-27 RX ADMIN — FAMOTIDINE SCH MG: 20 TABLET ORAL at 21:19

## 2019-01-27 RX ADMIN — BISACODYL 1 MG: 5 TABLET, COATED ORAL at 15:34

## 2019-01-27 RX ADMIN — CARBIDOPA AND LEVODOPA SCH TAB: 25; 100 TABLET ORAL at 08:08

## 2019-01-27 RX ADMIN — INSULIN GLARGINE SCH UNITS: 100 INJECTION, SOLUTION SUBCUTANEOUS at 21:31

## 2019-01-27 RX ADMIN — MORPHINE SULFATE 1 MG: 10 SOLUTION ORAL at 03:27

## 2019-01-27 RX ADMIN — INSULIN ASPART 1 UNIT: 100 INJECTION, SOLUTION INTRAVENOUS; SUBCUTANEOUS at 11:55

## 2019-01-27 RX ADMIN — DOCUSATE SODIUM 1 MG: 100 CAPSULE, LIQUID FILLED ORAL at 21:18

## 2019-01-27 RX ADMIN — BISACODYL SCH MG: 5 TABLET, COATED ORAL at 15:34

## 2019-01-27 NOTE — RADRPT
Echocardiogram Report

 

Patient Name:  CINDY VALIENTE  Gender:       Male

MRN:           [id number]        Accession #:  [id number]

Birth Date:    1940   Study Date:   24-Jan-2019

Sonographer:   TOBY Cline [facility]  Location:     [facility]

 

Ref. Physician: ALBINA ELLISON

Quality: Technically Difficult Study

 

Procedures: Transthoracic echocardiogram with complete 2D, M-Mode, and 

doppler examination.

Indications: Cerebrovascular Accident.

 

2D/M Mode                          Doppler

Measurement  Value  Normal Ranges  Measurement    Value  Normal Ranges

LVIDd 2D     4.4    3.5 - 5.6 cm   AV Peak Sher    1.0    m/sec

LVIDs 2D     2.8    2.1 - 4.1 cm   AV Peak PG     4.0    mmHg

LVPWd 2D     1.2    0.6 - 1.1 cm   LVOT Peak Sher  0.6    m/sec

IVSd 2D      1.3    0.6 - 1.1 cm   LVOT Peak PG   1.0    mmHg

AoR Diam 2D  3.2    2.0 - 3.7 cm   MV E Peak Sher  0.4    m/sec

LA/Ao 2D     1      0 - 1          MV A Peak Sher  0.6    m/sec

LA Dimen 2D  3.5    2.3 - 4.0 cm   MV E/A         0.6

MV Decel Time  176    msec

MV E/A         0.6

TR Peak Sher    2.2    m/sec

TR Peak PG     20.0   mmHg

RVSP           23.0   mmHg

RA Pressure    3.0

 

Findings

Left Ventricle: Normal left ventricular systolic function.  Normal left 

ventricular cavity size.  Mild concentric left ventricular hypertrophy.

Right Ventricle: Normal right ventricular size.  Normal right 

ventricular systolic function.

Left Atrium: The left atrium is normal in size.

Right Atrium: The right atrium is normal in size.

Mitral Valve: Normal appearance of the mitral valve.  Mild mitral 

annular calcification.  No mitral valve regurgitation is seen.

Aortic Valve: No significant aortic stenosis or insufficiency.  Aortic 

cusps appear mildly calcified.

Tricuspid Valve: Normal appearance of the tricuspid valve.  Estimated 

peak PA systolic pressure 23 mmHg.  There is trace tricuspid 

regurgitation.

Pulmonic Valve: Normal pulmonic valve appearance.

Pericardium: Normal pericardium with no significant pericardial effusion.

Aorta: Normal aortic root.

IVC: Normal size and normal respiratory collapse consistent with normal 

right atrial pressure.

 

Conclusions

Normal left ventricular systolic function.  Normal left ventricular 

cavity size.  Mild concentric left ventricular hypertrophy.

 

Electronically Signed By:

Harry Estrada

27-Jan-2019 08:43:05  -0800

 

Patient Name: CINDY VALIENTE

MRN: [id number]

Study Date: 24-Jan-2019

 

[id number]

## 2019-01-27 NOTE — NUR
ELISABETH



pt is alert to self, on room air, stable 

hourly rounding done 

turn q2 hours 


-------------------------------------------------------------------------------

Addendum: 01/27/19 at 1908 by ABI DOWNING RN

-------------------------------------------------------------------------------

pt able to move both his legs however unable to raise up to hold up to 90 degree angle for 
full 10 seconds

## 2019-01-27 NOTE — NUR
NURSE NOTE



pt more altered today. He is alert to self 

He is complaining of feeling hot, temp is 97.3 

the room is cold and he has no blankets

pt states he is having pain, morphine given 

Dr. DOYEL NOTIFIED

suggested CT/MRI but he does not think there is another need for it 

continuing to monitor 


-------------------------------------------------------------------------------

Addendum: 01/27/19 at 1127 by ABI DOWNING RN

-------------------------------------------------------------------------------

bp 152/85 hr 104 

pt c/o unable to move both his legs 

Dr. Doyle is notified 

he said he will be her to round on the pt soon

Left Dr. Singh a voicemail awaiting call back 

Charge Nurse Shi licea, agrees to wait for MD 

-------------------------------------------------------------------------------

Addendum: 01/27/19 at 1902 by ABI DOWNING RN

-------------------------------------------------------------------------------

attempted to call Dr. Singh's phone however out of service per Nursing supervisor CELESTE

## 2019-01-27 NOTE — NUR
END OF THE SHIFT:PT. IS MOSTLY CONFUSED,BUT ABLE TO FOLLOW COMMANDS.SR ON THE MONITOR.CONDOM 
CATHETER IS IN PLACE ,SIGNIFICANT AMOUNT OF URINE DRAINED.MORPHINE ORALLY GIVEN AS A PAIN 
MANAGEMENT.ORDERED IV FLUID IS IN PROGRESS.CONTINUE TO MONITOR.

## 2019-01-27 NOTE — PN
Date/Time of Note


Date/Time of Note


DATE: 1/27/19 


TIME: 11:55





Assessment/Plan


VTE Prophylaxis


Risk score (from Ns)>0 risk:  6


SCD applied (from Ns):  Yes


Pharmacological prophylaxis:  LMWH





Lines/Catheters


IV Catheter Type (from [facility]):  Peripheral IV


Urinary Cath still in place:  No





Assessment/Plan


Hospital Course


-Acute stroke with right-sided weakness.  Continue aspirin Lipitor.  Continue PT


and OT.   Dr. Braden is following  in neurology consultation.  


-Gram-negative rods UTI, continue Rocephin.  


-Diabetes mellitus type 2 with hemoglobin A1c 8.1.  


-Parkinson's disease, continue Sinemet.


Result Diagram:  


1/24/19 0514                                                                    


           1/24/19 0514





Results 24hrs





Laboratory Tests


  Test
            1/26/19
17:20  1/26/19
20:04  1/27/19
03:22  1/27/19
08:07


  Bedside Glucose         396  H          187            206            180


  Test
            1/27/19
11:22  
              
              



  Bedside Glucose         237  H








Subjective


24 Hr Interval Summary


Free Text/Dictation


Patient states that he is having severe back pain and is unable to move his 


legs, particularly on left side





Exam/Review of Systems


Exam


Vitals





Vital Signs


  Date      Temp  Pulse  Resp  B/P (MAP)   Pulse Ox  O2          O2 Flow    FiO2


Time                                                 Delivery    Rate


   1/27/19  97.5     86    20      156/88        92


     11:03                          (110)


   1/25/19                                           Room Air


     11:58








Intake and Output





1/26/19 1/26/19 1/27/19





1515:00


23:00


07:00





IntakeIntake Total


1150 ml


1150 ml





OutputOutput Total


1000 ml





BalanceBalance


1150 ml


150 ml











Constitutional:  well developed


Head:  normocephalic, atraumatic


Neck:  supple


Respiratory:  clear to auscultation


Cardiovascular:  regular rate and rhythm


Gastrointestinal:  soft, non-tender


Extremities:  normal pulses





Results


Results 24hrs





Laboratory Tests


  Test
            1/26/19
17:20  1/26/19
20:04  1/27/19
03:22  1/27/19
08:07


  Bedside Glucose         396  H          187            206            180


  Test
            1/27/19
11:22  
              
              



  Bedside Glucose         237  H








Medications


Medication





Current Medications


Potassium Chloride/Dextrose/ Sod Cl 1,000 ml @  70 mls/hr [id number] IV  Last 


administered on 1/27/19at 05:52; Admin Dose 70 MLS/HR;  Start 1/23/19 at 16:04


Ondansetron HCl (Zofran Inj) 4 mg Q6H  PRN IV NAUSEA;  Start 1/23/19 at 16:30


Ceftriaxone Sodium 50 ml @  100 mls/hr Q24H IVPB  Last administered on 1/26/19at


17:21; Admin Dose 100 MLS/HR;  Start 1/23/19 at 17:00


Miscellaneous Information 1 ea NOTE XX ;  Start 1/23/19 at 17:00


Glucose (Glutose) 15 gm Q15M  PRN PO DECREASED GLUCOSE;  Start 1/23/19 at 17:00


Glucose (Glutose) 22.5 gm Q15M  PRN PO DECREASED GLUCOSE;  Start 1/23/19 at 


17:00


Dextrose (D50w Syringe) 25 ml Q15M  PRN IV DECREASED GLUCOSE;  Start 1/23/19 at 


17:00


Dextrose (D50w Syringe) 50 ml Q15M  PRN IV DECREASED GLUCOSE;  Start 1/23/19 at 


17:00


Glucagon (Glucagen) 1 mg Q15M  PRN IM DECREASED GLUCOSE;  Start 1/23/19 at 17:00


Glucose (Glutose) 15 gm Q15M  PRN BUCCAL DECREASED GLUCOSE;  Start 1/23/19 at 


17:00


Atorvastatin Calcium (Lipitor) 40 mg HS PO  Last administered on 1/26/19at 2


0:05; Admin Dose 40 MG;  Start 1/24/19 at 21:00


Carbidopa/Levodopa (Sinemet (25/ 100)) 1 tab TID PO  Last administered on 


1/27/19at 08:08; Admin Dose 1 TAB;  Start 1/24/19 at 09:00


Metformin HCl (Glucophage) 500 mg BID WITH  MEALS PO  Last administered on 


1/27/19at 08:08; Admin Dose 500 MG;  Start 1/25/19 at 08:00


Diagnostic Test (Pha) (Accu-Chek) 1 ea 02 XX  Last administered on 1/27/19at 


02:00; Admin Dose 1 EA;  Start 1/25/19 at 02:00


Famotidine (Pepcid) 20 mg Q12 PO  Last administered on 1/27/19at 08:08; Admin 


Dose 20 MG;  Start 1/25/19 at 21:00


Insulin Glargine (Lantus) 10 units DAILY@2000 SC  Last administered on 1/26/19at


20:23; Admin Dose 10 UNITS;  Start 1/25/19 at 20:30


Docusate Sodium (Colace) 100 mg BID PO  Last administered on 1/27/19at 08:08; 


Admin Dose 100 MG;  Start 1/26/19 at 14:30


Morphine Sulfate (morphine) 6 mg Q4H  PRN PO MOD PAIN 4-6 Last administered on 


1/27/19at 11:11; Admin Dose 6 MG;  Start 1/26/19 at 14:30


Insulin Aspart (Novolog Insulin Pen) NOVOLOG *MODERATE* ALGORITHM WITH MEALS  


BEDTIME SC  Last administered on 1/27/19at 08:18; Admin Dose 2 UNIT;  Start 


1/26/19 at 18:00


Clonidine (Catapres) 0.1 mg Q6H  PRN GTB sbp >160;  Start 1/27/19 at 11:30











DIPTI DOYLE                   Jan 27, 2019 11:56

## 2019-01-28 VITALS — RESPIRATION RATE: 20 BRPM | HEART RATE: 103 BPM | SYSTOLIC BLOOD PRESSURE: 129 MMHG | DIASTOLIC BLOOD PRESSURE: 80 MMHG

## 2019-01-28 VITALS — RESPIRATION RATE: 18 BRPM | DIASTOLIC BLOOD PRESSURE: 67 MMHG | HEART RATE: 92 BPM | SYSTOLIC BLOOD PRESSURE: 123 MMHG

## 2019-01-28 VITALS — HEART RATE: 95 BPM | DIASTOLIC BLOOD PRESSURE: 73 MMHG | SYSTOLIC BLOOD PRESSURE: 127 MMHG | RESPIRATION RATE: 20 BRPM

## 2019-01-28 VITALS — HEART RATE: 90 BPM

## 2019-01-28 VITALS — RESPIRATION RATE: 18 BRPM | DIASTOLIC BLOOD PRESSURE: 76 MMHG | HEART RATE: 89 BPM | SYSTOLIC BLOOD PRESSURE: 132 MMHG

## 2019-01-28 VITALS — DIASTOLIC BLOOD PRESSURE: 68 MMHG | RESPIRATION RATE: 18 BRPM | HEART RATE: 90 BPM | SYSTOLIC BLOOD PRESSURE: 123 MMHG

## 2019-01-28 VITALS — HEART RATE: 88 BPM

## 2019-01-28 VITALS — HEART RATE: 94 BPM

## 2019-01-28 VITALS — HEART RATE: 108 BPM

## 2019-01-28 RX ADMIN — CARBIDOPA AND LEVODOPA 1 TAB: 25; 100 TABLET ORAL at 08:47

## 2019-01-28 RX ADMIN — DOCUSATE SODIUM SCH MG: 100 CAPSULE, LIQUID FILLED ORAL at 20:15

## 2019-01-28 RX ADMIN — FAMOTIDINE SCH MG: 20 TABLET ORAL at 08:47

## 2019-01-28 RX ADMIN — INSULIN GLARGINE SCH UNITS: 100 INJECTION, SOLUTION SUBCUTANEOUS at 20:30

## 2019-01-28 RX ADMIN — ATORVASTATIN CALCIUM SCH MG: 40 TABLET, FILM COATED ORAL at 20:15

## 2019-01-28 RX ADMIN — CARBIDOPA AND LEVODOPA SCH TAB: 25; 100 TABLET ORAL at 20:15

## 2019-01-28 RX ADMIN — FAMOTIDINE 1 MG: 20 TABLET ORAL at 08:47

## 2019-01-28 RX ADMIN — INSULIN GLARGINE 1 UNITS: 100 INJECTION, SOLUTION SUBCUTANEOUS at 20:30

## 2019-01-28 RX ADMIN — BISACODYL SCH MG: 5 TABLET, COATED ORAL at 08:47

## 2019-01-28 RX ADMIN — INSULIN ASPART 1 UNIT: 100 INJECTION, SOLUTION INTRAVENOUS; SUBCUTANEOUS at 12:14

## 2019-01-28 RX ADMIN — INSULIN ASPART 1 UNIT: 100 INJECTION, SOLUTION INTRAVENOUS; SUBCUTANEOUS at 07:57

## 2019-01-28 RX ADMIN — CEFTRIAXONE 1 MLS/HR: 1 INJECTION, SOLUTION INTRAVENOUS at 17:47

## 2019-01-28 RX ADMIN — CARBIDOPA AND LEVODOPA 1 TAB: 25; 100 TABLET ORAL at 20:15

## 2019-01-28 RX ADMIN — DOCUSATE SODIUM SCH MG: 100 CAPSULE, LIQUID FILLED ORAL at 08:47

## 2019-01-28 RX ADMIN — INSULIN ASPART 1 UNIT: 100 INJECTION, SOLUTION INTRAVENOUS; SUBCUTANEOUS at 17:59

## 2019-01-28 RX ADMIN — CARBIDOPA AND LEVODOPA 1 TAB: 25; 100 TABLET ORAL at 12:09

## 2019-01-28 RX ADMIN — FAMOTIDINE 1 MG: 20 TABLET ORAL at 20:15

## 2019-01-28 RX ADMIN — ATORVASTATIN CALCIUM 1 MG: 40 TABLET, FILM COATED ORAL at 20:15

## 2019-01-28 RX ADMIN — CARBIDOPA AND LEVODOPA SCH TAB: 25; 100 TABLET ORAL at 12:09

## 2019-01-28 RX ADMIN — BISACODYL 1 MG: 5 TABLET, COATED ORAL at 08:47

## 2019-01-28 RX ADMIN — CARBIDOPA AND LEVODOPA SCH TAB: 25; 100 TABLET ORAL at 08:47

## 2019-01-28 RX ADMIN — DEXTROSE, SODIUM CHLORIDE, AND POTASSIUM CHLORIDE 1 MLS/HR: 5; .45; .15 INJECTION INTRAVENOUS at 10:58

## 2019-01-28 RX ADMIN — FAMOTIDINE SCH MG: 20 TABLET ORAL at 20:15

## 2019-01-28 RX ADMIN — INSULIN ASPART 1 UNIT: 100 INJECTION, SOLUTION INTRAVENOUS; SUBCUTANEOUS at 20:30

## 2019-01-28 RX ADMIN — CEFTRIAXONE SCH MLS/HR: 1 INJECTION, SOLUTION INTRAVENOUS at 17:47

## 2019-01-28 RX ADMIN — DEXTROSE, SODIUM CHLORIDE, AND POTASSIUM CHLORIDE SCH MLS/HR: 5; .45; .15 INJECTION INTRAVENOUS at 10:58

## 2019-01-28 RX ADMIN — DOCUSATE SODIUM 1 MG: 100 CAPSULE, LIQUID FILLED ORAL at 08:47

## 2019-01-28 RX ADMIN — DOCUSATE SODIUM 1 MG: 100 CAPSULE, LIQUID FILLED ORAL at 20:15

## 2019-01-28 NOTE — NUR
PT NOTE



Therapy day number 

4

Subjective 

Current complaint of pain

Pain Scale

NUMERIC

Pain Intensity

0 (0-10)

Patient Stated Goal for Pain Relief 

0 (0-10)

Pain Level Comment 

NECK, BACK, AND (B) SHOULDERS

Exercise Assessment Label

Bilat Lower Extremity

Exercise Type

Active Assist ROM

Additional Exercise Comments 

EOB: AP's, knee flex/ext, marches

Exercise Start Time 

11:30

Exercise End Time 

11:44

Total Exercise Time 

14 min (8-127)

Transfer Training Start Time 

11:44

Supine to Sit 

Maximum Assist

Transfer Sit to Stand Ability

Maximum Assist

Bed Mobility Sit to Supine

Maximum Assist

Sitting Tolerance

25 min

Additional Mobility Comments 

STS maxA x 2, blocking B knees; sitting<>supine maxA x 2

Transfer Training End Time 

12:15

Total Transfer Training Time 

31 min (8-127)

Patient uses wheelchair 

Yes

Static Sitting Balance 

Good

Dynamic Sitting Balance 

Fair

Standing Static Balance 

Poor

Additional Balance Assessments Comments 

with FWW and 2PA

Safety Judgement 

Fair

Activity Tolerance 

Fair

Equipment Present 

A pump

Mcqueen Catheter

IV pump

Post Treatment Pain Intensity 

0 0-10

Variance Documentation 

SEE BELOW AND PT NOTE

Total Treament Time 

45 min (8-127)

Total Minutes 

45

Total Units 

3

PT Technical Record Comment 

PT NOTE



S: Pt stated, "My neck and back is killing me. Both my shoulders hurt 

too." Agreeable for PT and cleared per MARTHA Valenzuela.

O: Received pt in semi-fowlers, alert. Bed mobility w/HOB elevated using 

BR w/VCs/TCs for hand placement & sequence MaxA x 2. Applied gait belt at 

EOB. Pt performed AAROM BLE thera ex, see above for exercises, 2 sets x 10 

per exercise. Pt then performed static/dynamic sitting balance exercises: 

weight shifitng side to side and forward/backward and multidirectional 

reaching exercises. Noted pt would lean to R side, therefore required 

VCs/TCs to correct posture, [place]. Tolerated sitting ~25 minutes. Attempted 

STS x 2 using FWW MaxA x 2 w/VCs/TCs for proper hand/foot placement & 

sequence and (B) knee blocking. Noted pt unable to  full upright 

posture, difficulty with hip extension in standing, and with retropulsion 

despite Max VCs/TCs and facilitation. Pt reported extreme back pain, 

therefore unable to attempt more standing activities. Assisted pt BTB MaxA 

x 2. Positioned pt for comfort, call light/phone within reach, bed 

alarmed, and all needs met. MARTHA Valenzuela approached room post tx and was 

informed of pt's status. Pt refused SCD's, RN notified. Pt left attended 

w/RN post tx.

A: Fair tolerance to tx. Pt showed no signs of distress or SOB during or 

after tx. No c/o dizziness or nausea throughout tx. Sitting tolerance 

improved compared to previous tx. Pt continues to demonstrate difficulty 

w/standing activities due to pain and generalized weakness. 

P: Continue POC and progress as tolerated.

## 2019-01-28 NOTE — NUR
OT NOTE: 

 

S: RN cleared pt for skilled OT tx 

 

O: Pt received supine in bed and agreeable to tx despite 10/10 back pain. Pt required Max 
Ax2 to perform supine->sit at EOB with verbal cueing for proper hand placement. Pt required 
support ranging form CGA/MIN A to sit at EOB. Pt tends to slouch forward and lean to his 
right side. OTR provided pt with strategies to improve posture including squeezing shoulder 
blades together. Pt proceeded to engage in RUE AAROM exercises using left arm to assist in 
shoulder and elbow flexion x10. OTR provided pt with a toothbrush in the right hand and 
toothpaste in the left and and pt applied toothpaste with verbal cueing. Pt attempted to 
brush teeth using right hand however couldn't reach his mouth and OTR advised pt to use  
left hand. Pt completed tasks with min A and verbal cueing for sequencing. Pt returned to 
bed with Max Ax2. Seated in bed pt donned gown following maikol dressing technique with Mod A 
and verbal cueing. Pt left supine in bed with all needs met. RN notified. 

 

A: Pt hugo tx well 

 

P: Cont POC.

## 2019-01-28 NOTE — NUR
Cognitive-Linguistic Speech Evaluation Completed:



Brief Hx: Mr. Pastrana is a 79 y/o male being treated on telemetry unit for acute CVA, and UTI. 
Med hx is also significant for Parkinson's disease, which was diagnosed a few months ago, 
and diabetes mellitus type 2. He initially presented to the ER with complaints of of right 
upper and lower extremity weakness, right-sided drooling and right arm pain, which started 
yesterday morning.   He stated that at home he was able to eat and drink and does not 
exhibit any definite dysphagia.  

CT of the head revealed: Atrophy, White matter disease, compatible with chronic small vessel 
ischemia. Chronic lacunar infarcts left caudate nucleus and left frontal corona radiata. 
Left basal ganglia infarct of unknown chronicity. 



MRI Brain w/ or w/o contrast completed on 1/24/2019:  IMPRESSION: 

1.  Acute/recent infarct in the left coronal radiata/frontal periventricular white matter 
which extends to the posterior left lentiform nucleus measuring up to 2.7 cm in diameter, 
consistent with acute/recent infarct.  No acute intracranial hemorrhage or mass. Tiny old 
left inferior cerebellar infarct.  Mild to moderate chronic small vessel ischemic changes.  
Mild to moderate generalized cerebral volume loss.



********************************************************************************************
******



Auditory comprehension: 

Pt does not have aphasia. He was able to follow simple 1-step commands with 100% accuracy 
and able to perform 2-step related commands with repetition and visual aid. Difficulty with 
noted with auditory processing of information provided at the word level, as the pt started 
to perseverate and repeat the word back to the therapist, instead of comprehending what the 
therapist was telling the pt. (e.g, "you need to be louder and pt was repeat the final word 
,louder").



Verbal Expression:

Pt does not have aphasia, anomia or difficulty with expressing his basic needs but does 
require extra time to express more ideas or thoughts to his spouse.



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 
the word level and approx 70% to the familiar listener, reduced vocal volume and intensity 
(58DB) for each word, reduced sustained phonation on /ah/ for approx 3 seconds on average in 
length and monotone in intonation. However, once prompted to think "SHOUT," pt was able to 
increase his overall speech intelligibility at the word level to the familiar listener to 
90% intelligibility and increase to 78dB. 



Memory:

Long term: able to recall spouse and sisters name, but difficulty recalling close family 
friend name, unable to state his phone number but recalls his spouse and his social security 
number.

Short term: able to recall 1/3 functional items provided to him

Immediate: 100% accuracy with recalling 3/3 within a 10 second interval

Orientation: oriented x4



Problem Solving: able to solve simple commands such as use w/ call light to get assistance 
from nursing staff but unable to state the correct sequencing steps for functional tasks, 
such as brushing teeth.

Verbally stated he was aware of his physical limitations and not safe for mobilize on his 
own.



Attention/executive function; unable to sustain or divide attention or perform executive 
function tasks.



A: Pt presents with moderate cognitive linguistic tasks associated with reduced memory, 
problem solving, attention, problem solving and auditory processing of information. Pt also 
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



P: 1. Initiate speech therapy 3-5x per week for 1-2 weeks .2 continue to target speech 
intelligibility, use of external visual aids with family present inorder to compensate for 
the cognitive linguistic deficits

## 2019-01-28 NOTE — NUR
END OF THE SHIFT:PT. IS CONFUSED,ABLE TO FOLLOW SIMPLE COMMANDS.NIHS SCORE=5.HEMODYNAMICALLY 
STABLE.BG-WNL.ON CONTINUOS IV FLUID PER ORDER.CONTINUE TO FOLLOW CURRENT PLAN OF CARE.

## 2019-01-28 NOTE — NUR
Dysphagia/Swallow Therapy Note:



Current diet: regular/thin liquids



S: Pt was received during lunchtime with family present. He was only seated at a 40 degree 
angle as the family members were about to assist the pt with his lunch meal tray. Pt was 
quickly repositioned with his HOB to almost a 90% angle/upright in bed inorder to optimize 
positioning during p.o intake,



O: Rx plan was reviewed and tx consisted of the following; 1. improve swallow safety. 2. 
assess p.o intake during lunch meal tray with use of swallowing strategies: pt consumed 
single bites of regular textures (chicken and oranges) and followed by use of tongue sweep 
re-swallow and liquid wash. Slow but adequate bolus manipulation and mastication. Reduced 
A-P transit. no s/s of aspiration or penetration noted. 3. Family was instructed on use of 
swallowing strategies during p.o intake with  thin liquids by straw (pull away the straw 
after each sip), inorder to give the pt ability to control the liquid during and after the 
swallow before the next sip. Family verbalized agreement and f/u with demonstration to 
therapist since pt is dependent on assistance due to reduced ability to self-feed. Pt and 
family were encouraged to allow the pt to self feed with use of his left hand inorder to 
create more functional independence, but remind the pt to take single bites and sips with 
pulling away the straw after each sip to ensure single sips/slow rate. 4. p.o status/diet 
consistency/level of supervision: continue regular diet and thin liquids



A: Mild oropharyngeal dysphagia associated with mild oropharyngeal weakness and reduced 
coordination. Pt continues to safely tolerate his current diet w/o any s/s of aspiration or 
penetration. Family continues to be compliant with the recommended strategies to optimize 
the swallow function. 



P: 1. Continue Poc. 2. Continue with regular diet and thin liquids, as safely tolerated.

## 2019-01-29 VITALS — DIASTOLIC BLOOD PRESSURE: 70 MMHG | SYSTOLIC BLOOD PRESSURE: 132 MMHG | RESPIRATION RATE: 18 BRPM | HEART RATE: 82 BPM

## 2019-01-29 VITALS — RESPIRATION RATE: 18 BRPM | DIASTOLIC BLOOD PRESSURE: 76 MMHG | HEART RATE: 84 BPM | SYSTOLIC BLOOD PRESSURE: 139 MMHG

## 2019-01-29 VITALS — HEART RATE: 73 BPM | RESPIRATION RATE: 20 BRPM | DIASTOLIC BLOOD PRESSURE: 80 MMHG | SYSTOLIC BLOOD PRESSURE: 141 MMHG

## 2019-01-29 VITALS — DIASTOLIC BLOOD PRESSURE: 74 MMHG | SYSTOLIC BLOOD PRESSURE: 139 MMHG | HEART RATE: 93 BPM | RESPIRATION RATE: 18 BRPM

## 2019-01-29 VITALS — HEART RATE: 110 BPM

## 2019-01-29 VITALS — RESPIRATION RATE: 19 BRPM | SYSTOLIC BLOOD PRESSURE: 129 MMHG | HEART RATE: 94 BPM | DIASTOLIC BLOOD PRESSURE: 77 MMHG

## 2019-01-29 VITALS — HEART RATE: 88 BPM

## 2019-01-29 VITALS — RESPIRATION RATE: 19 BRPM | HEART RATE: 91 BPM | SYSTOLIC BLOOD PRESSURE: 137 MMHG | DIASTOLIC BLOOD PRESSURE: 74 MMHG

## 2019-01-29 VITALS — HEART RATE: 85 BPM

## 2019-01-29 VITALS — HEART RATE: 91 BPM

## 2019-01-29 VITALS — HEART RATE: 95 BPM

## 2019-01-29 LAB
ADD MAN DIFF?: NO
ANION GAP: 13 (ref 5–13)
BASOPHIL #: 0.1 10^3/UL (ref 0–0.1)
BASOPHILS %: 0.5 % (ref 0–2)
BLOOD UREA NITROGEN: 14 MG/DL (ref 7–20)
CALCIUM: 9.2 MG/DL (ref 8.4–10.2)
CARBON DIOXIDE: 28 MMOL/L (ref 21–31)
CHLORIDE: 95 MMOL/L (ref 97–110)
CREATININE: 0.56 MG/DL (ref 0.61–1.24)
EOSINOPHILS #: 0.4 10^3/UL (ref 0–0.5)
EOSINOPHILS %: 4.1 % (ref 0–7)
GLUCOSE: 177 MG/DL (ref 70–220)
HEMATOCRIT: 39.1 % (ref 42–52)
HEMOGLOBIN: 13.1 G/DL (ref 14–18)
IMMATURE GRANS #M: 0.08 10^3/UL (ref 0–0.03)
IMMATURE GRANS % (M): 0.8 % (ref 0–0.43)
LYMPHOCYTES #: 1.5 10^3/UL (ref 0.8–2.9)
LYMPHOCYTES %: 14.5 % (ref 15–51)
MEAN CORPUSCULAR HEMOGLOBIN: 30.3 PG (ref 29–33)
MEAN CORPUSCULAR HGB CONC: 33.5 G/DL (ref 32–37)
MEAN CORPUSCULAR VOLUME: 90.5 FL (ref 82–101)
MEAN PLATELET VOLUME: 8.6 FL (ref 7.4–10.4)
MONOCYTE #: 1.1 10^3/UL (ref 0.3–0.9)
MONOCYTES %: 10.8 % (ref 0–11)
NEUTROPHIL #: 7 10^3/UL (ref 1.6–7.5)
NEUTROPHILS %: 69.3 % (ref 39–77)
NUCLEATED RED BLOOD CELLS #: 0 10^3/UL (ref 0–0)
NUCLEATED RED BLOOD CELLS%: 0 /100WBC (ref 0–0)
PLATELET COUNT: 407 10^3/UL (ref 140–415)
POTASSIUM: 4.2 MMOL/L (ref 3.5–5.1)
RED BLOOD COUNT: 4.32 10^6/UL (ref 4.7–6.1)
RED CELL DISTRIBUTION WIDTH: 12 % (ref 11.5–14.5)
SODIUM: 136 MMOL/L (ref 135–144)
WHITE BLOOD COUNT: 10 10^3/UL (ref 4.8–10.8)

## 2019-01-29 RX ADMIN — DEXTROSE, SODIUM CHLORIDE, AND POTASSIUM CHLORIDE SCH MLS/HR: 5; .45; .15 INJECTION INTRAVENOUS at 00:46

## 2019-01-29 RX ADMIN — DOCUSATE SODIUM 1 MG: 100 CAPSULE, LIQUID FILLED ORAL at 20:25

## 2019-01-29 RX ADMIN — INSULIN ASPART 1 UNIT: 100 INJECTION, SOLUTION INTRAVENOUS; SUBCUTANEOUS at 20:26

## 2019-01-29 RX ADMIN — CARBIDOPA AND LEVODOPA SCH TAB: 25; 100 TABLET ORAL at 20:25

## 2019-01-29 RX ADMIN — MORPHINE SULFATE PRN MG: 10 SOLUTION ORAL at 09:30

## 2019-01-29 RX ADMIN — FAMOTIDINE 1 MG: 20 TABLET ORAL at 20:25

## 2019-01-29 RX ADMIN — MORPHINE SULFATE 1 MG: 10 SOLUTION ORAL at 09:30

## 2019-01-29 RX ADMIN — DOCUSATE SODIUM 1 MG: 100 CAPSULE, LIQUID FILLED ORAL at 08:51

## 2019-01-29 RX ADMIN — DEXTROSE, SODIUM CHLORIDE, AND POTASSIUM CHLORIDE 1 MLS/HR: 5; .45; .15 INJECTION INTRAVENOUS at 00:46

## 2019-01-29 RX ADMIN — BISACODYL SCH MG: 5 TABLET, COATED ORAL at 08:52

## 2019-01-29 RX ADMIN — DEXTROSE, SODIUM CHLORIDE, AND POTASSIUM CHLORIDE SCH MLS/HR: 5; .45; .15 INJECTION INTRAVENOUS at 16:16

## 2019-01-29 RX ADMIN — LINAGLIPTIN 1 MG: 5 TABLET, FILM COATED ORAL at 08:52

## 2019-01-29 RX ADMIN — INSULIN GLARGINE SCH UNITS: 100 INJECTION, SOLUTION SUBCUTANEOUS at 20:34

## 2019-01-29 RX ADMIN — CARBIDOPA AND LEVODOPA SCH TAB: 25; 100 TABLET ORAL at 08:52

## 2019-01-29 RX ADMIN — FAMOTIDINE 1 MG: 20 TABLET ORAL at 08:51

## 2019-01-29 RX ADMIN — CARBIDOPA AND LEVODOPA 1 TAB: 25; 100 TABLET ORAL at 20:25

## 2019-01-29 RX ADMIN — ATORVASTATIN CALCIUM 1 MG: 40 TABLET, FILM COATED ORAL at 20:25

## 2019-01-29 RX ADMIN — INSULIN ASPART 1 UNIT: 100 INJECTION, SOLUTION INTRAVENOUS; SUBCUTANEOUS at 17:32

## 2019-01-29 RX ADMIN — DOCUSATE SODIUM SCH MG: 100 CAPSULE, LIQUID FILLED ORAL at 20:25

## 2019-01-29 RX ADMIN — INSULIN ASPART 1 UNIT: 100 INJECTION, SOLUTION INTRAVENOUS; SUBCUTANEOUS at 08:05

## 2019-01-29 RX ADMIN — BISACODYL 1 MG: 5 TABLET, COATED ORAL at 08:52

## 2019-01-29 RX ADMIN — DEXTROSE, SODIUM CHLORIDE, AND POTASSIUM CHLORIDE 1 MLS/HR: 5; .45; .15 INJECTION INTRAVENOUS at 16:16

## 2019-01-29 RX ADMIN — CARBIDOPA AND LEVODOPA SCH TAB: 25; 100 TABLET ORAL at 13:46

## 2019-01-29 RX ADMIN — CARBIDOPA AND LEVODOPA 1 TAB: 25; 100 TABLET ORAL at 08:52

## 2019-01-29 RX ADMIN — INSULIN GLARGINE 1 UNITS: 100 INJECTION, SOLUTION SUBCUTANEOUS at 20:34

## 2019-01-29 RX ADMIN — FAMOTIDINE SCH MG: 20 TABLET ORAL at 08:51

## 2019-01-29 RX ADMIN — CEFTRIAXONE SCH MLS/HR: 1 INJECTION, SOLUTION INTRAVENOUS at 16:16

## 2019-01-29 RX ADMIN — FAMOTIDINE SCH MG: 20 TABLET ORAL at 20:25

## 2019-01-29 RX ADMIN — INSULIN ASPART 1 UNIT: 100 INJECTION, SOLUTION INTRAVENOUS; SUBCUTANEOUS at 11:16

## 2019-01-29 RX ADMIN — ASPIRIN 1 MG: 81 TABLET, COATED ORAL at 08:51

## 2019-01-29 RX ADMIN — MORPHINE SULFATE PRN MG: 10 SOLUTION ORAL at 21:43

## 2019-01-29 RX ADMIN — MORPHINE SULFATE 1 MG: 10 SOLUTION ORAL at 21:43

## 2019-01-29 RX ADMIN — CEFTRIAXONE 1 MLS/HR: 1 INJECTION, SOLUTION INTRAVENOUS at 16:16

## 2019-01-29 RX ADMIN — CARBIDOPA AND LEVODOPA 1 TAB: 25; 100 TABLET ORAL at 13:46

## 2019-01-29 RX ADMIN — ASPIRIN SCH MG: 81 TABLET, COATED ORAL at 08:51

## 2019-01-29 RX ADMIN — ATORVASTATIN CALCIUM SCH MG: 40 TABLET, FILM COATED ORAL at 20:25

## 2019-01-29 RX ADMIN — DOCUSATE SODIUM SCH MG: 100 CAPSULE, LIQUID FILLED ORAL at 08:51

## 2019-01-29 RX ADMIN — LINAGLIPTIN SCH MG: 5 TABLET, FILM COATED ORAL at 08:52

## 2019-01-29 NOTE — NUR
PT NOTE

                                              [facility]                   
                           

--------------------------------------------------------------------------------------------
----------------------------

Patient: Victorinao Banegas                               : 1940                    
Age/Sex:  78/M



Unit#: [id number]                                  Account#:  [id number]     Room/Bed:  
[facility]/B

--------------------------------------------------------------------------------------------
----------------------------





User: Yanira Espinosa PTA                      Date: 19 13:30               Type: PT 
Technical Record

--------------------------------------------------------------------------------------------
----------------------------



Therapy day number 

5

Subjective 

Current complaint of pain

Pain Scale

NUMERIC

Pain Intensity

6 (0-10)

Patient Stated Goal for Pain Relief 

0 (0-10)

Pain Level Comment 

neck, back and B shld; premedicated

Pre Treatment Vital Signs Stable 

Yes - 142/68 mmHg, 90 bpm, 95% O2 sat on RA

Exercise Assessment Label

Bilat Lower Extremity

Exercise Type

Active ROM

Additional Exercise Comments 

supine AROM/AAROM: APs, heels slides, hip abd/add; seated HRs, SAQs

Exercise Start Time 

13:30

Exercise End Time 

13:45

Total Exercise Time 

15 min (8-127)

Transfer Training Start Time 

13:46

Supine to Sit 

Maximum Assist

Transfer Sit to Stand Ability

Maximum Assist

Bed Mobility Sit to Supine

Maximum Assist

Additional Mobility Comments 

2PA for safety and mobility; 2P max STS x3; block knee; retropulsive

Transfer Training End Time 

14:10

Total Transfer Training Time 

24 min (8-127)

Static Sitting Balance 

Good

Dynamic Sitting Balance 

Fair

Standing Static Balance 

Poor

Additional Balance Assessments Comments 

lat weight shifting; dynamic reaching

Safety Judgement 

Poor

Activity Tolerance 

Fair

Equipment Present 

A pump

Mcqueen Catheter

IV pump

Post Treatment Pain Intensity 

4 0-10

Variance Documentation 

see PT note

Total Treament Time 

39 min (8-127)

Total Minutes 

39

Total Units 

3

PT Technical Record Comment 

PT NOTE

S: "I got a new mattress so my back feels a little better." Agreed to 

skilled PT. Cleared and premedicated by Alida BANKS RN.

O: Received awake in semi-paez. Performed supine AROM/AAROM therx; see 

tech record for details. Vitals: 142/68 mmHg, 90 bpm, 95% O2 sat on RA. 

Transfer to EOB. Performed seated lat weight shifting onto forearm, 

dynamic reaching, and B LE therex. STS x3 2P max A with constant VCs on 

hand/feet placement and sequencing. Encouragement to perform ant lean as 

pt is retropulsive and verbalized fear of falling. Unable to extend 

hips/trunk and B elbows to full upright position. B knee block required. 

Returned to EOB d/t fatigue. Returned to supine, positioned in 

semi-paez. Left with OT and family at bedside. 

A: Pt hugo tx fairly. 2PA required for safety and mobility. Decreased 

strength and endurance. Pt demonstrated retropulsive behavior due to fear 

of falling. Improved static/dynamic sitting balance. Family reported pt 

not very ambulatory prior to stroke. Pt would spend most of day in bed and 

chair.

P: Continue with POC.

## 2019-01-29 NOTE — NUR
OT NOTE 

 

S: RN cleared pt for skilled OT tx.  

 

O: Pt received supine in bed and agreeable to tx. Pt stated 6/10 back pain. Pt performed 
supine->sit at EOB with Max A and verbal cueing for proper hand foot placement. Once seated 
at EOB pt demonstrated F- balance. Pt tends to lean to the right and requires verbal cueing 
to correct his posture. Pt engaged in weight bearing exercises by leaning on his right (then 
left) forearm while using his left hand (then right) to reach across his midline. OTR 
educated pt on compensatory strategies to perform oral hygiene with greater ease. Pt 
completed oral hygiene with Min A while using his right hand to stabilize his toothbrush 
while applying toothpaste. Pt finally performed BUE AROM exercises in all planes to increase 
strength and endurance for functional ADL's. Pt retuned to bed with Max A. RN notified. 

 

A: Pt hguo tx well - demonstrating increased participation 

 

P: COnt POC.

## 2019-01-29 NOTE — NUR
Pt AOx2, bedbound, V/S stable, for PT/Ot today and, poss, D/C to rehab center , will 
endorsed to oncoming shift

## 2019-01-29 NOTE — NUR
[facility] Management Referral:



Thank you for the referral.





R: Consider a slight increase in basal to Lantus 13 units daily.

## 2019-01-29 NOTE — NUR
EOSS:



Patient remain stable. No significant changes. VS within normal limits. BS within normal 
limits. Hourly rounding done. Q2 hr repositioning. Assisted with ADL's care. Skin care and 
wound care treatment provided. Administered scheduled meds. PRN pain meds provided. PT done 
today. Will endorse plan of care to the next shift.

## 2019-01-29 NOTE — PN
Date/Time of Note


Date/Time of Note


DATE: 1/29/19 


TIME: 16:13





Assessment/Plan


VTE Prophylaxis


Risk score (from Ns)>0 risk:  6


SCD applied (from [facility] [place]  Yes


Pharmacological prophylaxis:  NA/contraindicated


Pharm contraindication:  other





Lines/Catheters


IV Catheter Type (from [facility]):  Peripheral IV


Urinary Cath still in place:  No





Assessment/Plan


Hospital Course


Patient is hemodynamically stable, afebrile.  Pending acute rehab evaluation.  


Patient's condition and plan of care discussed with patient wife at the bedside.


Assessment/Plan


-Acute stroke with right-sided weakness.  Continue aspirin Lipitor.  Continue PT


and OT.   Dr. Braden is following  in neurology consultation.  


-Gram-negative rods UTI, continue Rocephin.  


-Diabetes mellitus type 2 with hemoglobin A1c 8.1.  Continue metformin and 


Tradjenta, NovoLog per mild algorithm sliding scale.


-Parkinson's disease, continue Sinemet.





Further recommendations based on clinical course.  Plan of care discussed with 


Dr. Win.


Result Diagram:  


1/29/19 0535                                                                    


           1/29/19 0535





Results 24hrs





Laboratory Tests


Test
                 1/28/19
17:45  1/28/19
20:13  1/29/19
02:37  1/29/19
05:35


Bedside Glucose               196            186            165


White Blood Count                                                         10.0


Red Blood Count                                                          4.32  L


Hemoglobin                                                               13.1  L


Hematocrit                                                               39.1  L


Mean Corpuscular                                                          90.5


Volume


Mean Corpuscular                                                          30.3


Hemoglobin


Mean Corpuscular      
              
              
                    33.5  



Hemoglobin
Concent


Red Cell                                                                  12.0


Distribution Width


Platelet Count                                                             407


Mean Platelet Volume                                                       8.6


Immature                                                                0.800  H


Granulocytes %


Neutrophils %                                                             69.3


Lymphocytes %                                                            14.5  L


Monocytes %                                                               10.8


Eosinophils %                                                              4.1


Basophils %                                                                0.5


Nucleated Red Blood                                                        0.0


Cells %


Immature                                                                0.080  H


Granulocytes #


Neutrophils #                                                              7.0


Lymphocytes #                                                              1.5


Monocytes #                                                               1.1  H


Eosinophils #                                                              0.4


Basophils #                                                                0.1


Nucleated Red Blood                                                        0.0


Cells #


Sodium Level                                                               136


Potassium Level                                                            4.2


Chloride Level                                                             95  L


Carbon Dioxide Level                                                        28


Anion Gap                                                                   13


Blood Urea Nitrogen                                                         14


Creatinine                                                               0.56  L


Est Glomerular        
              
              
                



Filtrat Rate
mL/min


Glucose Level                                                              177


Calcium Level                                                              9.2


Test
                 1/29/19
08:01  1/29/19
11:09  
              



Bedside Glucose               178            213








Exam/Review of Systems


Exam


Vitals





Vital Signs


  Date      Temp  Pulse  Resp  B/P (MAP)   Pulse Ox  O2          O2 Flow    FiO2


Time                                                 Delivery    Rate


   1/29/19  98.0     82    18      132/70        97


     15:41                           (90)


   1/25/19                                           Room Air


     11:58








Intake and Output





1/28/19 1/28/19 1/29/19





1515:00


23:00


07:00





IntakeIntake Total


1720 ml





OutputOutput Total


1400 ml





BalanceBalance


320 ml











Exam


Constitutional:  alert, oriented


Respiratory:  clear to auscultation


Cardiovascular:  nl pulses


Gastrointestinal:  soft, non-tender


Musculoskeletal:  nl extremities to inspection


Extremities:  normal pulses


Neurological:  other (Right-sided weakness)


Skin:  other (Right knee abrasion, mid chest scar)





Results


Results 24hrs





Laboratory Tests


Test
                 1/28/19
17:45  1/28/19
20:13  1/29/19
02:37  1/29/19
05:35


Bedside Glucose               196            186            165


White Blood Count                                                         10.0


Red Blood Count                                                          4.32  L


Hemoglobin                                                               13.1  L


Hematocrit                                                               39.1  L


Mean Corpuscular                                                          90.5


Volume


Mean Corpuscular                                                          30.3


Hemoglobin


Mean Corpuscular      
              
              
                    33.5  



Hemoglobin
Concent


Red Cell                                                                  12.0


Distribution Width


Platelet Count                                                             407


Mean Platelet Volume                                                       8.6


Immature                                                                0.800  H


Granulocytes %


Neutrophils %                                                             69.3


Lymphocytes %                                                            14.5  L


Monocytes %                                                               10.8


Eosinophils %                                                              4.1


Basophils %                                                                0.5


Nucleated Red Blood                                                        0.0


Cells %


Immature                                                                0.080  H


Granulocytes #


Neutrophils #                                                              7.0


Lymphocytes #                                                              1.5


Monocytes #                                                               1.1  H


Eosinophils #                                                              0.4


Basophils #                                                                0.1


Nucleated Red Blood                                                        0.0


Cells #


Sodium Level                                                               136


Potassium Level                                                            4.2


Chloride Level                                                             95  L


Carbon Dioxide Level                                                        28


Anion Gap                                                                   13


Blood Urea Nitrogen                                                         14


Creatinine                                                               0.56  L


Est Glomerular        
              
              
                



Filtrat Rate
mL/min


Glucose Level                                                              177


Calcium Level                                                              9.2


Test
                 1/29/19
08:01  1/29/19
11:09  
              



Bedside Glucose               178            213








Medications


Medication





Current Medications


Potassium Chloride/Dextrose/ Sod Cl 1,000 ml @  70 mls/hr [id number] IV  Last 


administered on 1/29/19at 00:46; Admin Dose 70 MLS/HR;  Start 1/23/19 at 16:04


Ondansetron HCl (Zofran Inj) 4 mg Q6H  PRN IV NAUSEA;  Start 1/23/19 at 16:30


Ceftriaxone Sodium 50 ml @  100 mls/hr Q24H IVPB  Last administered on 1/28/19at


17:47; Admin Dose 100 MLS/HR;  Start 1/23/19 at 17:00


Miscellaneous Information 1 ea NOTE XX ;  Start 1/23/19 at 17:00


Glucose (Glutose) 15 gm Q15M  PRN PO DECREASED GLUCOSE;  Start 1/23/19 at 17:00


Glucose (Glutose) 22.5 gm Q15M  PRN PO DECREASED GLUCOSE;  Start 1/23/19 at 


17:00


Dextrose (D50w Syringe) 25 ml Q15M  PRN IV DECREASED GLUCOSE;  Start 1/23/19 at 


17:00


Dextrose (D50w Syringe) 50 ml Q15M  PRN IV DECREASED GLUCOSE;  Start 1/23/19 at 


17:00


Glucagon (Glucagen) 1 mg Q15M  PRN IM DECREASED GLUCOSE;  Start 1/23/19 at 17:00


Glucose (Glutose) 15 gm Q15M  PRN BUCCAL DECREASED GLUCOSE;  Start 1/23/19 at 


17:00


Atorvastatin Calcium (Lipitor) 40 mg HS PO  Last administered on 1/28/19at 


20:15; Admin Dose 40 MG;  Start 1/24/19 at 21:00


Carbidopa/Levodopa (Sinemet (25/ 100)) 1 tab TID PO  Last administered on 


1/29/19at 13:46; Admin Dose 1 TAB;  Start 1/24/19 at 09:00


Metformin HCl (Glucophage) 500 mg BID WITH  MEALS PO  Last administered on 


1/29/19at 08:51; Admin Dose 500 MG;  Start 1/25/19 at 08:00


Diagnostic Test (Pha) (Accu-Chek) 1 ea 02 XX  Last administered on 1/28/19at 


01:51; Admin Dose 1 EA;  Start 1/25/19 at 02:00


Famotidine (Pepcid) 20 mg Q12 PO  Last administered on 1/29/19at 08:51; Admin 


Dose 20 MG;  Start 1/25/19 at 21:00


Insulin Glargine (Lantus) 10 units DAILY@2000 SC  Last administered on 1/28/19at


20:30; Admin Dose 10 UNITS;  Start 1/25/19 at 20:30


Docusate Sodium (Colace) 100 mg BID PO  Last administered on 1/29/19at 08:51; 


Admin Dose 100 MG;  Start 1/26/19 at 14:30


Morphine Sulfate (morphine) 6 mg Q4H  PRN PO MOD PAIN 4-6 Last administered on 


1/29/19at 09:30; Admin Dose 6 MG;  Start 1/26/19 at 14:30


Insulin Aspart (Novolog Insulin Pen) NOVOLOG *MODERATE* ALGORITHM WITH MEALS  


BEDTIME SC  Last administered on 1/29/19at 11:16; Admin Dose 4 UNIT;  Start 


1/26/19 at 18:00


Clonidine (Catapres) 0.1 mg Q6H  PRN GTB sbp >160;  Start 1/27/19 at 11:30


Bisacodyl (Dulcolax) 10 mg DAILY PO  Last administered on 1/29/19at 08:52; Admin


Dose 10 MG;  Start 1/27/19 at 15:00


Linagliptin (Tradjenta) 5 mg DAILY PO  Last administered on 1/29/19at 08:52; 


Admin Dose 5 MG;  Start 1/29/19 at 09:00


Aspirin (Halfprin) 81 mg DAILY PO  Last administered on 1/29/19at 08:51; Admin 


Dose 81 MG;  Start 1/29/19 at 09:00











LUCY CHEN             Jan 29, 2019 16:16

## 2019-01-30 VITALS — DIASTOLIC BLOOD PRESSURE: 71 MMHG | RESPIRATION RATE: 20 BRPM | SYSTOLIC BLOOD PRESSURE: 180 MMHG | HEART RATE: 96 BPM

## 2019-01-30 VITALS — HEART RATE: 97 BPM

## 2019-01-30 VITALS — HEART RATE: 93 BPM | DIASTOLIC BLOOD PRESSURE: 103 MMHG | SYSTOLIC BLOOD PRESSURE: 187 MMHG | RESPIRATION RATE: 18 BRPM

## 2019-01-30 VITALS — HEART RATE: 90 BPM | RESPIRATION RATE: 22 BRPM | DIASTOLIC BLOOD PRESSURE: 70 MMHG | SYSTOLIC BLOOD PRESSURE: 143 MMHG

## 2019-01-30 VITALS — HEART RATE: 100 BPM | SYSTOLIC BLOOD PRESSURE: 145 MMHG | RESPIRATION RATE: 18 BRPM | DIASTOLIC BLOOD PRESSURE: 73 MMHG

## 2019-01-30 VITALS — HEART RATE: 96 BPM | DIASTOLIC BLOOD PRESSURE: 78 MMHG | RESPIRATION RATE: 20 BRPM | SYSTOLIC BLOOD PRESSURE: 136 MMHG

## 2019-01-30 VITALS — HEART RATE: 82 BPM

## 2019-01-30 VITALS — DIASTOLIC BLOOD PRESSURE: 71 MMHG | RESPIRATION RATE: 18 BRPM | SYSTOLIC BLOOD PRESSURE: 139 MMHG | HEART RATE: 95 BPM

## 2019-01-30 VITALS — DIASTOLIC BLOOD PRESSURE: 89 MMHG | HEART RATE: 80 BPM | RESPIRATION RATE: 18 BRPM | SYSTOLIC BLOOD PRESSURE: 155 MMHG

## 2019-01-30 VITALS — HEART RATE: 94 BPM

## 2019-01-30 VITALS — HEART RATE: 95 BPM

## 2019-01-30 RX ADMIN — DOCUSATE SODIUM SCH MG: 100 CAPSULE, LIQUID FILLED ORAL at 08:46

## 2019-01-30 RX ADMIN — ATORVASTATIN CALCIUM SCH MG: 40 TABLET, FILM COATED ORAL at 21:08

## 2019-01-30 RX ADMIN — INSULIN ASPART 1 UNIT: 100 INJECTION, SOLUTION INTRAVENOUS; SUBCUTANEOUS at 08:13

## 2019-01-30 RX ADMIN — INSULIN GLARGINE 1 UNITS: 100 INJECTION, SOLUTION SUBCUTANEOUS at 21:13

## 2019-01-30 RX ADMIN — DEXTROSE, SODIUM CHLORIDE, AND POTASSIUM CHLORIDE 1 MLS/HR: 5; .45; .15 INJECTION INTRAVENOUS at 05:35

## 2019-01-30 RX ADMIN — BISACODYL SCH MG: 5 TABLET, COATED ORAL at 08:46

## 2019-01-30 RX ADMIN — ASPIRIN SCH MG: 81 TABLET, COATED ORAL at 08:46

## 2019-01-30 RX ADMIN — CARBIDOPA AND LEVODOPA 1 TAB: 25; 100 TABLET ORAL at 14:20

## 2019-01-30 RX ADMIN — CARBIDOPA AND LEVODOPA 1 TAB: 25; 100 TABLET ORAL at 21:08

## 2019-01-30 RX ADMIN — DEXTROSE, SODIUM CHLORIDE, AND POTASSIUM CHLORIDE 1 MLS/HR: 5; .45; .15 INJECTION INTRAVENOUS at 17:23

## 2019-01-30 RX ADMIN — INSULIN ASPART 1 UNIT: 100 INJECTION, SOLUTION INTRAVENOUS; SUBCUTANEOUS at 17:36

## 2019-01-30 RX ADMIN — LINAGLIPTIN SCH MG: 5 TABLET, FILM COATED ORAL at 08:46

## 2019-01-30 RX ADMIN — INSULIN GLARGINE SCH UNITS: 100 INJECTION, SOLUTION SUBCUTANEOUS at 21:13

## 2019-01-30 RX ADMIN — CARBIDOPA AND LEVODOPA 1 TAB: 25; 100 TABLET ORAL at 08:46

## 2019-01-30 RX ADMIN — MORPHINE SULFATE 1 MG: 10 SOLUTION ORAL at 14:21

## 2019-01-30 RX ADMIN — FAMOTIDINE 1 MG: 20 TABLET ORAL at 21:07

## 2019-01-30 RX ADMIN — INSULIN ASPART 1 UNIT: 100 INJECTION, SOLUTION INTRAVENOUS; SUBCUTANEOUS at 21:00

## 2019-01-30 RX ADMIN — MORPHINE SULFATE PRN MG: 10 SOLUTION ORAL at 14:21

## 2019-01-30 RX ADMIN — CARBIDOPA AND LEVODOPA SCH TAB: 25; 100 TABLET ORAL at 08:46

## 2019-01-30 RX ADMIN — FAMOTIDINE 1 MG: 20 TABLET ORAL at 08:46

## 2019-01-30 RX ADMIN — BISACODYL 1 MG: 5 TABLET, COATED ORAL at 08:46

## 2019-01-30 RX ADMIN — DEXTROSE, SODIUM CHLORIDE, AND POTASSIUM CHLORIDE SCH MLS/HR: 5; .45; .15 INJECTION INTRAVENOUS at 17:23

## 2019-01-30 RX ADMIN — ASPIRIN 1 MG: 81 TABLET, COATED ORAL at 08:46

## 2019-01-30 RX ADMIN — LINAGLIPTIN 1 MG: 5 TABLET, FILM COATED ORAL at 08:46

## 2019-01-30 RX ADMIN — FAMOTIDINE SCH MG: 20 TABLET ORAL at 21:07

## 2019-01-30 RX ADMIN — INSULIN ASPART 1 UNIT: 100 INJECTION, SOLUTION INTRAVENOUS; SUBCUTANEOUS at 12:04

## 2019-01-30 RX ADMIN — ATORVASTATIN CALCIUM 1 MG: 40 TABLET, FILM COATED ORAL at 21:08

## 2019-01-30 RX ADMIN — DOCUSATE SODIUM 1 MG: 100 CAPSULE, LIQUID FILLED ORAL at 21:07

## 2019-01-30 RX ADMIN — CARBIDOPA AND LEVODOPA SCH TAB: 25; 100 TABLET ORAL at 21:08

## 2019-01-30 RX ADMIN — DEXTROSE, SODIUM CHLORIDE, AND POTASSIUM CHLORIDE SCH MLS/HR: 5; .45; .15 INJECTION INTRAVENOUS at 05:35

## 2019-01-30 RX ADMIN — FAMOTIDINE SCH MG: 20 TABLET ORAL at 08:46

## 2019-01-30 RX ADMIN — DOCUSATE SODIUM SCH MG: 100 CAPSULE, LIQUID FILLED ORAL at 21:07

## 2019-01-30 RX ADMIN — DOCUSATE SODIUM 1 MG: 100 CAPSULE, LIQUID FILLED ORAL at 08:46

## 2019-01-30 RX ADMIN — CARBIDOPA AND LEVODOPA SCH TAB: 25; 100 TABLET ORAL at 14:20

## 2019-01-30 NOTE — NUR
OT NOTE 

 

S: RN cleared pt for skilled OT tx.  

 

O: Pt received supine in bed and agreeable to tx. Pt stated 10/10 back 

pain. Pt engaged in  ROM exercises seated in bed (JARET AROM SHOULDER FLEX x10 and RIGHT AAROM 
shoulder flex x10). Pt performed supine->sit at EOB with Max A and verbal cueing for 

proper hand foot placement. Once seated at EOB pt demonstrated F balance. 

Pt engaged in weight bearing exercises by leaning on his right 

(then left) forearm while using his left hand (then right) to reach across 

his midline. OTR reviewed maikol dressing strategies to jonny gown Pt proceeded to jonny gown 
with Mod A and verbal cueing following technique. Pt retuned to bed 

with Max Ax2. RN notified. 

 

A: Pt hugo tx well - demonstrating increased sitting balance  

 

P: COnt POC.

## 2019-01-30 NOTE — NUR
Pt AOX2 , with on and off confusion, forget full , V/s stable, still bedbound, needs PT/ot 
or rehab, will endorsed to oncoming rRN

## 2019-01-30 NOTE — PN
Date/Time of Note


Date/Time of Note


DATE: 1/30/19 


TIME: 16:55





Assessment/Plan


VTE Prophylaxis


Risk score (from Ns)>0 risk:  6


SCD applied (from [facility] [place]):  Yes


Pharmacological prophylaxis:  NA/contraindicated


Pharm contraindication:  surgical contra, other





Lines/Catheters


IV Catheter Type (from [facility]):  Peripheral IV


Urinary Cath still in place:  No





Assessment/Plan


Hospital Course


Patient remains hemodynamically stable afebrile, patient was intermittent 


periods of confusion continue physical therapy okay to discharge to acute rehab 


when bed is available.


Assessment/Plan


-Acute stroke with right-sided weakness.  Continue aspirin Lipitor.  Continue PT


and OT.   Dr. Braden is following  in neurology consultation.  


-Gram-negative rods UTI, s/p  Rocephin


-Diabetes mellitus type 2 with hemoglobin A1c 8.1.  Continue metformin and 


Tradjenta, NovoLog per mild algorithm sliding scale.


-Parkinson's disease, continue Sinemet.





Further recommendations based on clinical course.  Plan of care discussed with 


Dr. Win.


Result Diagram:  


1/29/19 0535                                                                    


           1/29/19 0535





Results 24hrs





Laboratory Tests


  Test
            1/29/19
17:25  1/29/19
20:16  1/30/19
02:20  1/30/19
08:07


  Bedside Glucose          156            134            164            150


  Test
            1/30/19
12:01  
              
              



  Bedside Glucose          189








Exam/Review of Systems


Exam


Vitals





Vital Signs


  Date      Temp  Pulse  Resp  B/P (MAP)   Pulse Ox  O2          O2 Flow    FiO2


Time                                                 Delivery    Rate


   1/30/19           97


     16:28


   1/30/19  98.0           20      180/71        96  Room Air


     15:59                          (107)








Intake and Output





1/29/19 1/29/19 1/30/19





1515:00


23:00


07:00





IntakeIntake Total


500 ml





OutputOutput Total


1200 ml





BalanceBalance


-700 ml











Exam


Constitutional:  alert, oriented


Respiratory:  clear to auscultation


Cardiovascular:  nl pulses


Gastrointestinal:  soft, non-tender


Musculoskeletal:  nl extremities to inspection


Extremities:  normal pulses


Neurological:  other (Right-sided weakness)


Skin:  other (Right knee abrasion, mid chest scar)





Results


Results 24hrs





Laboratory Tests


  Test
            1/29/19
17:25  1/29/19
20:16  1/30/19
02:20  1/30/19
08:07


  Bedside Glucose          156            134            164            150


  Test
            1/30/19
12:01  
              
              



  Bedside Glucose          189








Medications


Medication





Current Medications


Potassium Chloride/Dextrose/ Sod Cl 1,000 ml @  70 mls/hr [id number] IV  Last 


administered on 1/30/19at 05:35; Admin Dose 70 MLS/HR;  Start 1/23/19 at 16:04


Ondansetron HCl (Zofran Inj) 4 mg Q6H  PRN IV NAUSEA;  Start 1/23/19 at 16:30


Miscellaneous Information 1 ea NOTE XX ;  Start 1/23/19 at 17:00


Glucose (Glutose) 15 gm Q15M  PRN PO DECREASED GLUCOSE;  Start 1/23/19 at 17:00


Glucose (Glutose) 22.5 gm Q15M  PRN PO DECREASED GLUCOSE;  Start 1/23/19 at 


17:00


Dextrose (D50w Syringe) 25 ml Q15M  PRN IV DECREASED GLUCOSE;  Start 1/23/19 at 


17:00


Dextrose (D50w Syringe) 50 ml Q15M  PRN IV DECREASED GLUCOSE;  Start 1/23/19 at 


17:00


Glucagon (Glucagen) 1 mg Q15M  PRN IM DECREASED GLUCOSE;  Start 1/23/19 at 17:00


Glucose (Glutose) 15 gm Q15M  PRN BUCCAL DECREASED GLUCOSE;  Start 1/23/19 at 


17:00


Atorvastatin Calcium (Lipitor) 40 mg HS PO  Last administered on 1/29/19at 


20:25; Admin Dose 40 MG;  Start 1/24/19 at 21:00


Carbidopa/Levodopa (Sinemet (25/ 100)) 1 tab TID PO  Last administered on 


1/30/19at 14:20; Admin Dose 1 TAB;  Start 1/24/19 at 09:00


Metformin HCl (Glucophage) 500 mg BID WITH  MEALS PO  Last administered on 


1/30/19at 08:45; Admin Dose 500 MG;  Start 1/25/19 at 08:00


Diagnostic Test (Pha) (Accu-Chek) 1 ea 02 XX  Last administered on 1/28/19at 01


:51; Admin Dose 1 EA;  Start 1/25/19 at 02:00


Famotidine (Pepcid) 20 mg Q12 PO  Last administered on 1/30/19at 08:46; Admin 


Dose 20 MG;  Start 1/25/19 at 21:00


Insulin Glargine (Lantus) 10 units DAILY@2000 SC  Last administered on 1/29/19at


20:34; Admin Dose 10 UNITS;  Start 1/25/19 at 20:30


Docusate Sodium (Colace) 100 mg BID PO  Last administered on 1/30/19at 08:46; 


Admin Dose 100 MG;  Start 1/26/19 at 14:30


Morphine Sulfate (morphine) 6 mg Q4H  PRN PO MOD PAIN 4-6 Last administered on 


1/30/19at 14:21; Admin Dose 6 MG;  Start 1/26/19 at 14:30


Insulin Aspart (Novolog Insulin Pen) NOVOLOG *MODERATE* ALGORITHM WITH MEALS  


BEDTIME SC  Last administered on 1/30/19at 12:04; Admin Dose 4 UNIT;  Start 


1/26/19 at 18:00


Clonidine (Catapres) 0.1 mg Q6H  PRN GTB sbp >160;  Start 1/27/19 at 11:30


Bisacodyl (Dulcolax) 10 mg DAILY PO  Last administered on 1/30/19at 08:46; Admin


Dose 10 MG;  Start 1/27/19 at 15:00


Linagliptin (Tradjenta) 5 mg DAILY PO  Last administered on 1/30/19at 08:46; 


Admin Dose 5 MG;  Start 1/29/19 at 09:00


Aspirin (Halfprin) 81 mg DAILY PO  Last administered on 1/30/19at 08:46; Admin 


Dose 81 MG;  Start 1/29/19 at 09:00











LUCY CHEN             Jan 30, 2019 16:57

## 2019-01-30 NOTE — NUR
SW: LOS/ INITIAL PSYCHOSOCIAL ASSESSMENT



SW met with patient and family at bedside for LOS/ initial psychosocial assessment. 
Patient's wife states he lives with her at [address] [place] [address]. 
States he is retired and receives [employer] pension. He was independent at home with ADL's 
prior to stroke, according to family. Patient does have good family support. Patient's 
family is his primary mode of transportation at home. Patient's wife Ian Banegas 
([phone]) is primary surrogate spokesperson. She states that this is her home phone 
number, and provided this writer with patient's sister in law Kiara ([phone]) contact 
information to help locate wife Ian if she is not home.



All other questions/ concerns denied at this time. SW remains available as needed throughout 
patient's treatment process.

## 2019-01-30 NOTE — NUR
PT NOTE



                                              [facility]                   
                           

--------------------------------------------------------------------------------------------
----------------------------

Patient: Victoriano Banegas                               : 1940                    
Age/Sex:  78/M



Unit#: [id number]                                  Account#:  [id number]     Room/Bed:  
[facility]/B

--------------------------------------------------------------------------------------------
----------------------------





User: Shi Tracy PTA                     Date: 19 14:03               Type: PT 
Technical Record

--------------------------------------------------------------------------------------------
----------------------------



Therapy day number 

6

Subjective 

Current complaint of pain

Pain Scale

NUMERIC

Pain Intensity

10 (0-10)

Patient Stated Goal for Pain Relief 

0 (0-10)

Pain Level Comment 

nack and back pain

Exercise Assessment Label

Bilat Lower Extremity

Exercise Type

Active ROM

Additional Exercise Comments 

EOB; APs, LAQ, seated marches. trunk stability; lateral weight shifting

Exercise Start Time 

13:25

Exercise End Time 

13:40

Total Exercise Time 

15 min (8-127)

Transfer Training Start Time 

13:40

Supine to Sit 

Maximum Assist

Transfer Sit to Stand Ability

Maximum Assist

Bed Mobility Sit to Supine

Dependent

Additional Mobility Comments 

attempted sit to stand x4, pt unable to stand erect

Transfer Training End Time 

14:03

Total Transfer Training Time 

23 min (8-127)

Static Sitting Balance 

Good

Dynamic Sitting Balance 

Fair

Standing Static Balance 

Poor

Safety Judgement 

Fair

Activity Tolerance 

Poor

Equipment Present 

A pump

Mcqueen Catheter

IV pump

Post Treatment Pain Intensity 

10 0-10

Total Treament Time 

38 min (8-127)

Total Minutes 

38

Total Units 

3

PT Technical Record Comment 

S: MARTHA Irwin cleared pt for PT. Pt c/o 10/10 neck and back pain, 

agreeable to tx

O: Received pt sitting EOB w/ OT. Performed AROME while sitting EOB; see 

above for therapeutic exercises. Attempted sit to stand x4 w/ and w/o FWW. 

Pt unable to stand erect due to increasing back pain. Noted pt lack of use 

of BLEs while attempting to stand despite max VC/TC from this therapist 

and occupational therapist. Returned pt back to bed. Positioned pt to 

comfort in semifowler. Call light/phone within reach. Bed alarm on. Needs 

met. Pt refusing to apply SCD's. RN informed of pt status, SCD's refusal, 

and PT activities

A: Poor tolerance to tx. Improved sitting balance however limited standing 

due to increased back pain. 

P: Continue w/ POC and progress as tolerated

## 2019-01-31 ENCOUNTER — HOSPITAL ENCOUNTER (INPATIENT)
Dept: HOSPITAL 10 - VRC | Age: 79
LOS: 22 days | Discharge: HOME HEALTH | DRG: 57 | End: 2019-02-22
Attending: INTERNAL MEDICINE | Admitting: PHYSICAL MEDICINE & REHABILITATION
Payer: MEDICARE

## 2019-01-31 ENCOUNTER — HOSPITAL ENCOUNTER (INPATIENT)
Dept: HOSPITAL 91 - VRC | Age: 79
LOS: 22 days | Discharge: HOME HEALTH | DRG: 57 | End: 2019-02-22
Payer: MEDICARE

## 2019-01-31 VITALS — SYSTOLIC BLOOD PRESSURE: 109 MMHG | HEART RATE: 91 BPM | RESPIRATION RATE: 22 BRPM | DIASTOLIC BLOOD PRESSURE: 61 MMHG

## 2019-01-31 VITALS — HEART RATE: 101 BPM | DIASTOLIC BLOOD PRESSURE: 71 MMHG | SYSTOLIC BLOOD PRESSURE: 135 MMHG | RESPIRATION RATE: 20 BRPM

## 2019-01-31 VITALS — HEART RATE: 88 BPM

## 2019-01-31 VITALS — HEART RATE: 92 BPM

## 2019-01-31 VITALS — HEART RATE: 87 BPM | SYSTOLIC BLOOD PRESSURE: 116 MMHG | RESPIRATION RATE: 17 BRPM | DIASTOLIC BLOOD PRESSURE: 63 MMHG

## 2019-01-31 VITALS — SYSTOLIC BLOOD PRESSURE: 159 MMHG | RESPIRATION RATE: 18 BRPM | DIASTOLIC BLOOD PRESSURE: 74 MMHG | HEART RATE: 97 BPM

## 2019-01-31 VITALS — HEART RATE: 107 BPM

## 2019-01-31 VITALS — RESPIRATION RATE: 18 BRPM | HEART RATE: 89 BPM | SYSTOLIC BLOOD PRESSURE: 133 MMHG | DIASTOLIC BLOOD PRESSURE: 68 MMHG

## 2019-01-31 VITALS — WEIGHT: 142.86 LBS | BODY MASS INDEX: 23.8 KG/M2 | HEIGHT: 65 IN

## 2019-01-31 VITALS — SYSTOLIC BLOOD PRESSURE: 119 MMHG | RESPIRATION RATE: 22 BRPM | HEART RATE: 99 BPM | DIASTOLIC BLOOD PRESSURE: 75 MMHG

## 2019-01-31 VITALS — HEART RATE: 86 BPM

## 2019-01-31 VITALS — HEART RATE: 98 BPM

## 2019-01-31 DIAGNOSIS — I65.23: ICD-10-CM

## 2019-01-31 DIAGNOSIS — F01.50: ICD-10-CM

## 2019-01-31 DIAGNOSIS — R13.10: ICD-10-CM

## 2019-01-31 DIAGNOSIS — Z79.4: ICD-10-CM

## 2019-01-31 DIAGNOSIS — E78.5: ICD-10-CM

## 2019-01-31 DIAGNOSIS — I69.351: Primary | ICD-10-CM

## 2019-01-31 DIAGNOSIS — Z74.09: ICD-10-CM

## 2019-01-31 DIAGNOSIS — I25.10: ICD-10-CM

## 2019-01-31 DIAGNOSIS — I69.391: ICD-10-CM

## 2019-01-31 DIAGNOSIS — G20: ICD-10-CM

## 2019-01-31 DIAGNOSIS — E11.9: ICD-10-CM

## 2019-01-31 DIAGNOSIS — N39.0: ICD-10-CM

## 2019-01-31 DIAGNOSIS — F06.31: ICD-10-CM

## 2019-01-31 DIAGNOSIS — G93.49: ICD-10-CM

## 2019-01-31 PROCEDURE — 87081 CULTURE SCREEN ONLY: CPT

## 2019-01-31 PROCEDURE — 92610 EVALUATE SWALLOWING FUNCTION: CPT

## 2019-01-31 PROCEDURE — 92507 TX SP LANG VOICE COMM INDIV: CPT

## 2019-01-31 PROCEDURE — 97167 OT EVAL HIGH COMPLEX 60 MIN: CPT

## 2019-01-31 PROCEDURE — 81001 URINALYSIS AUTO W/SCOPE: CPT

## 2019-01-31 PROCEDURE — 97542 WHEELCHAIR MNGMENT TRAINING: CPT

## 2019-01-31 PROCEDURE — 97116 GAIT TRAINING THERAPY: CPT

## 2019-01-31 PROCEDURE — 97163 PT EVAL HIGH COMPLEX 45 MIN: CPT

## 2019-01-31 PROCEDURE — 87086 URINE CULTURE/COLONY COUNT: CPT

## 2019-01-31 PROCEDURE — 97530 THERAPEUTIC ACTIVITIES: CPT

## 2019-01-31 PROCEDURE — 82962 GLUCOSE BLOOD TEST: CPT

## 2019-01-31 PROCEDURE — 92523 SPEECH SOUND LANG COMPREHEN: CPT

## 2019-01-31 PROCEDURE — 85025 COMPLETE CBC W/AUTO DIFF WBC: CPT

## 2019-01-31 PROCEDURE — 97112 NEUROMUSCULAR REEDUCATION: CPT

## 2019-01-31 PROCEDURE — 80048 BASIC METABOLIC PNL TOTAL CA: CPT

## 2019-01-31 PROCEDURE — 92526 ORAL FUNCTION THERAPY: CPT

## 2019-01-31 PROCEDURE — 97535 SELF CARE MNGMENT TRAINING: CPT

## 2019-01-31 PROCEDURE — 97110 THERAPEUTIC EXERCISES: CPT

## 2019-01-31 RX ADMIN — INSULIN GLARGINE 1 UNITS: 100 INJECTION, SOLUTION SUBCUTANEOUS at 21:42

## 2019-01-31 RX ADMIN — ASPIRIN SCH MG: 81 TABLET, COATED ORAL at 08:21

## 2019-01-31 RX ADMIN — ATORVASTATIN CALCIUM 1 MG: 40 TABLET, FILM COATED ORAL at 22:30

## 2019-01-31 RX ADMIN — FAMOTIDINE SCH MG: 20 TABLET ORAL at 22:30

## 2019-01-31 RX ADMIN — CARBIDOPA AND LEVODOPA SCH TAB: 25; 100 TABLET ORAL at 22:30

## 2019-01-31 RX ADMIN — CARBIDOPA AND LEVODOPA 1 TAB: 25; 100 TABLET ORAL at 08:21

## 2019-01-31 RX ADMIN — CARBIDOPA AND LEVODOPA SCH TAB: 25; 100 TABLET ORAL at 14:24

## 2019-01-31 RX ADMIN — ATORVASTATIN CALCIUM SCH MG: 40 TABLET, FILM COATED ORAL at 20:12

## 2019-01-31 RX ADMIN — DOCUSATE SODIUM SCH MG: 100 CAPSULE, LIQUID FILLED ORAL at 08:21

## 2019-01-31 RX ADMIN — CARBIDOPA AND LEVODOPA 1 TAB: 25; 100 TABLET ORAL at 20:12

## 2019-01-31 RX ADMIN — MORPHINE SULFATE PRN MG: 10 SOLUTION ORAL at 19:56

## 2019-01-31 RX ADMIN — INSULIN ASPART 1 UNIT: 100 INJECTION, SOLUTION INTRAVENOUS; SUBCUTANEOUS at 19:17

## 2019-01-31 RX ADMIN — DOCUSATE SODIUM SCH MG: 100 CAPSULE, LIQUID FILLED ORAL at 22:30

## 2019-01-31 RX ADMIN — MORPHINE SULFATE 1 MG: 10 SOLUTION ORAL at 19:56

## 2019-01-31 RX ADMIN — CARBIDOPA AND LEVODOPA 1 TAB: 25; 100 TABLET ORAL at 14:24

## 2019-01-31 RX ADMIN — FAMOTIDINE 1 MG: 20 TABLET ORAL at 22:30

## 2019-01-31 RX ADMIN — ATORVASTATIN CALCIUM 1 MG: 40 TABLET, FILM COATED ORAL at 20:12

## 2019-01-31 RX ADMIN — DOCUSATE SODIUM 1 MG: 100 CAPSULE, LIQUID FILLED ORAL at 22:30

## 2019-01-31 RX ADMIN — FAMOTIDINE SCH MG: 20 TABLET ORAL at 08:21

## 2019-01-31 RX ADMIN — LINAGLIPTIN SCH MG: 5 TABLET, FILM COATED ORAL at 08:20

## 2019-01-31 RX ADMIN — BISACODYL SCH MG: 5 TABLET, COATED ORAL at 08:20

## 2019-01-31 RX ADMIN — INSULIN GLARGINE SCH UNITS: 100 INJECTION, SOLUTION SUBCUTANEOUS at 23:00

## 2019-01-31 RX ADMIN — CARBIDOPA AND LEVODOPA 1 TAB: 25; 100 TABLET ORAL at 22:30

## 2019-01-31 RX ADMIN — CARBIDOPA AND LEVODOPA SCH TAB: 25; 100 TABLET ORAL at 20:12

## 2019-01-31 RX ADMIN — FAMOTIDINE 1 MG: 20 TABLET ORAL at 08:21

## 2019-01-31 RX ADMIN — FAMOTIDINE SCH MG: 20 TABLET ORAL at 20:12

## 2019-01-31 RX ADMIN — ATORVASTATIN CALCIUM SCH MG: 40 TABLET, FILM COATED ORAL at 22:30

## 2019-01-31 RX ADMIN — INSULIN GLARGINE 1 UNITS: 100 INJECTION, SOLUTION SUBCUTANEOUS at 23:00

## 2019-01-31 RX ADMIN — ASPIRIN 1 MG: 81 TABLET, COATED ORAL at 08:21

## 2019-01-31 RX ADMIN — INSULIN ASPART 1 UNIT: 100 INJECTION, SOLUTION INTRAVENOUS; SUBCUTANEOUS at 14:28

## 2019-01-31 RX ADMIN — DEXTROSE, SODIUM CHLORIDE, AND POTASSIUM CHLORIDE 1 MLS/HR: 5; .45; .15 INJECTION INTRAVENOUS at 09:58

## 2019-01-31 RX ADMIN — DEXTROSE, SODIUM CHLORIDE, AND POTASSIUM CHLORIDE SCH MLS/HR: 5; .45; .15 INJECTION INTRAVENOUS at 09:58

## 2019-01-31 RX ADMIN — LINAGLIPTIN 1 MG: 5 TABLET, FILM COATED ORAL at 08:20

## 2019-01-31 RX ADMIN — DOCUSATE SODIUM 1 MG: 100 CAPSULE, LIQUID FILLED ORAL at 08:21

## 2019-01-31 RX ADMIN — INSULIN ASPART 1 UNIT: 100 INJECTION, SOLUTION INTRAVENOUS; SUBCUTANEOUS at 22:30

## 2019-01-31 RX ADMIN — DOCUSATE SODIUM 1 MG: 100 CAPSULE, LIQUID FILLED ORAL at 20:11

## 2019-01-31 RX ADMIN — INSULIN GLARGINE SCH UNITS: 100 INJECTION, SOLUTION SUBCUTANEOUS at 21:42

## 2019-01-31 RX ADMIN — FAMOTIDINE 1 MG: 20 TABLET ORAL at 20:12

## 2019-01-31 RX ADMIN — INSULIN ASPART 1 UNIT: 100 INJECTION, SOLUTION INTRAVENOUS; SUBCUTANEOUS at 08:32

## 2019-01-31 RX ADMIN — CARBIDOPA AND LEVODOPA SCH TAB: 25; 100 TABLET ORAL at 08:21

## 2019-01-31 RX ADMIN — DOCUSATE SODIUM SCH MG: 100 CAPSULE, LIQUID FILLED ORAL at 20:11

## 2019-01-31 RX ADMIN — BISACODYL 1 MG: 5 TABLET, COATED ORAL at 08:20

## 2019-01-31 NOTE — NUR
Wound Care Consult:



Patient is 78-year-old gentleman with history of diabetes and Parkinson's disease.  Patient 
had sudden onset of right-sided weakness and drooling witnessed by patient's wife.  Patient 
also collapsed due to persistent right-sided weakness and pain.  CT of the head was negative 
for hemorrhagic stroke.  Patient was diagnosed with acute stroke but he was outside of the 
window for TPA.  Patient was given a rectal aspirin since he failed bedside swallow 
evaluation.  



Wound care team consulted for wounds present on admission



Trunk area skin clean and intact



Right knee abrasion s/p fall (outside of hospital). Dry scab noted, no drainage. Recommend 
to cover with foam border dressing for protection daily and as needed.



Yovani Sesay RN MSN WOCN CM

## 2019-01-31 NOTE — HP
Date/Time of Note


Date/Time of Note


DATE: 1/31/19 


TIME: 23:13





Assessment/Plan


VTE Prophylaxis


SCD applied (from Nsg):  Yes


Pharmacological prophylaxis:  NA/contraindicated, other


Pharm contraindication:  other





Lines/Catheters


IV Catheter Type (from Nrsg):  Saline Lock


Urinary Cath still in place:  Yes (Condom Cath)


Reason Cath still needed:  urinary retention





Assessment/Plan


Assessment/Plan


-Acute stroke with right-sided weakness.  Continue aspirin and Lipitor.  


Continue PT and OT.  S/p evaluation by  Dr. Braden  in neurology 


consultation.  


-Encephalopathy secondary to acute stroke


-Diabetes mellitus type 2 with hemoglobin A1c 8.1.  Continue metformin and 


Tradjenta, NovoLog per mild algorithm sliding scale.


-Parkinson's disease, continue Sinemet.


-S/p UTI, completed treatment with Rocephin





Further recommendations based on clinical course.  Plan of care discussed with 


Dr. Win.


Results 24hrs





Laboratory Tests


                         Test
            1/31/19
23:01


                         Bedside Glucose           85








HPI/ROS


Admit Date/Time


Admit Date/Time


Jan 31, 2019 at 21:02





Hx of Present Illness


The patient is a 78-year-old male with history of diabetes and Parkinson's 


disease presented to [facility] with with sudden onset of 


right-sided weakness and drooling.  MRI of the brain revealed acute recent 


infarct in the left coronal radiata/frontal periventricular white matter which 


extends to the posterior left lentiform nucleus measuring up to 2.7 cm in 


diameter consistent with acute recent infarct and tiny old left inferior 


cerebellar infarct.  Unfortunately patient was outside of the window for TPA.  


Patient was admitted to [facility] and underwent evaluation by


speech physical and occupational therapy.  Patient past swallow evaluation and 


was started on diet which she tolerates.  Patient was evaluated by Dr. Mary ventura in neurology consultation.  Patient has right-sided residual weakness and 


encephalopathy secondary to acute recent stroke.  Patient is admitted for 


further rehabilitation and management to ARU.  Patient resides with his wife and


had caretakers and on discharge expected to return home.





ROS


12 point review of system is negative except for that mentioned in HPI





PMH/Family/Social


Past Medical History


Medical History:  diabetes, other (Parkinson's disease)


Medications





Current Medications


Aspirin (Halfprin) 81 mg DAILY PO ;  Start 2/1/19 at 09:00


Atorvastatin Calcium (Lipitor) 40 mg DAILY@21 PO ;  Start 1/31/19 at 22:30


Bisacodyl (Dulcolax) 10 mg DAILY PO ;  Start 2/1/19 at 09:00


Clonidine (Catapres) 0.1 mg Q6H  PRN PO ELEVATED BLOOD PRESSURE;  Start 1/31/19 


at 23:00


Miscellaneous Information 1 ea NOTE XX ;  Start 1/31/19 at 23:00


Glucose (Glutose) 15 gm Q15M  PRN PO DECREASED GLUCOSE;  Start 1/31/19 at 23:00


Glucose (Glutose) 22.5 gm Q15M  PRN PO DECREASED GLUCOSE;  Start 1/31/19 at 


23:00


Dextrose (D50w Syringe) 25 ml Q15M  PRN IV DECREASED GLUCOSE;  Start 1/31/19 at 


23:00


Dextrose (D50w Syringe) 50 ml Q15M  PRN IV DECREASED GLUCOSE;  Start 1/31/19 at 


23:00


Glucagon (Glucagen) 1 mg Q15M  PRN IM DECREASED GLUCOSE;  Start 1/31/19 at 23:00


Glucose (Glutose) 15 gm Q15M  PRN BUCCAL DECREASED GLUCOSE;  Start 1/31/19 at 


23:00


Docusate Sodium (Colace) 100 mg BID PO ;  Start 1/31/19 at 22:30


Famotidine (Pepcid) 20 mg Q12 PO ;  Start 1/31/19 at 22:30


Insulin Aspart (Novolog Insulin Pen) (Adult SC Insulin - Moder... WITH MEALS  


BEDTIME SC ;  Start 1/31/19 at 22:30


Diagnostic Test (Pha) (Accu-Chek) 1 ea 02 XX ;  Start 2/1/19 at 02:00


Insulin Glargine (Lantus) 10 units DAILY@2000 SC ;  Start 1/31/19 at 23:00


Carbidopa/Levodopa (Sinemet (25/ 100)) 1 tab TID PO ;  Start 1/31/19 at 22:30


Linagliptin (Tradjenta) 5 mg DAILY PO ;  Start 2/1/19 at 09:00


Metformin HCl (Glucophage) 500 mg BID WITH  MEALS PO ;  Start 2/1/19 at 07:35


Morphine Sulfate (morphine) 6 mg Q4H  PRN PO SEVERE PAIN LEVEL 7-10;  Start 


1/31/19 at 22:40


Ondansetron HCl (Zofran Inj) 4 mg Q6H  PRN IV NAUSEA AND/OR VOMITING;  Start 


1/31/19 at 23:00


Coded Allergies:  


     No Known Drug Allergies (Verified  Allergy, Mild, 1/28/19)





Past Surgical History


Past Surgical Hx:  other (Status post skin cancer surgery mid chest)





Family History


Significant Family History:  no pertinent family hx, other





Social History


Alcohol Use:  none


Smoking Status:  Never smoker


Drug Use:  none





Exam/Review of Systems


Vital Signs


Vitals





Vital Signs


  Date      Temp  Pulse  Resp  B/P (MAP)   Pulse Ox  O2          O2 Flow    FiO2


Time                                                 Delivery    Rate


   1/31/19  97.9     89    18      133/68        93  Room Air


     21:50                           (89)








Exam


Constitutional:  alert, oriented


Psych:  confusion


Head:  normocephalic


Neck:  supple


Respiratory:  clear to auscultation


Cardiovascular:  regular rate and rhythm


Gastrointestinal:  soft, non-tender


Musculoskeletal:  nl extremities to inspection


Extremities:  normal pulses


Neurological:  confused, other (R sided weakness)


Skin:  other (Right knee abrasion, healing, mid chest scar)











LUCY CHEN             Jan 31, 2019 23:17

## 2019-01-31 NOTE — NUR
OT NOTE: 

 

 S: MARTHA Bo cleared pt for skilled OT tx. 

 

O: Pt received supine in bed and agreeable to tx. Pt performed supine->sit at EOB with Max A 
and verbal cueing for hand placement. Seated at EOB pt demonstrated F+ balance while 
engaging in tx. OTR facilitated AAROM for right shoulder flexion to 90 degrees. Pt then 
proceeded to perform Active ROM of right shoulder flexion to 85 degrees. Pt demonstrated 
AROM of left shoulder to 90 degrees x10. Pt performed oral hygiene with Min A and verbal 
cueing for compensatory strategies (to use right hand to stabilize items). Pt continued to 
jonny gown following maikol dressing technique with vc for proper sequencing. Pt returned to 
bed with Max Ax2. Pt left supine in bed with all needs met. RN notified. 

 

A: Pt hugo tx well  

 

P: Cont POC.

## 2019-01-31 NOTE — NUR
END OF SHIFT REPORT



NO UNDUE DEVELOPMENT; PATIENT STILL WITH PERIODS OF CONFUSION. NO COMPLAINT.

TELE; SINUS RHYTHM TO SINUS TACH 'S

## 2019-01-31 NOTE — NUR
PT NOTE



                                              [facility]                   
                           

--------------------------------------------------------------------------------------------
----------------------------

Patient: Victoriano Banegas                               : 1940                    
Age/Sex:  78/M



Unit#: [id number]                                  Account#:  [id number]     [facility]  
[facility]

--------------------------------------------------------------------------------------------
----------------------------





User: Jonathan Peñaloza PT                         Date: 19 10:55               Type: PT 
Technical Record

--------------------------------------------------------------------------------------------
----------------------------



Therapy day number 

7

Subjective 

Current complaint of pain

Pain Scale

NUMERIC

Pain Intensity

5 (0-10)

Patient Stated Goal for Pain Relief 

0 (0-10)

Pain Level Comment 

R arm pain, back pain

Exercise Assessment Label

Bilat Lower Extremity

Exercise Type

Active ROM

Additional Exercise Comments 

supine SLR, heel slides, APs

Exercise Start Time 

10:55

Exercise End Time 

11:05

Total Exercise Time 

10 min (8-127)

Transfer Training Start Time 

11:05

Supine to Sit 

Moderate Assist

Transfer Sit to Stand Ability

Moderate Assist

Bed Mobility Sit to Supine

Maximum Assist

Sitting Tolerance

25 min

Additional Mobility Comments 

STS x 6 using platform walker maxA x 2 progressing to Hank x 2

Transfer Training End Time 

11:25

Total Transfer Training Time 

20 min (8-127)

Static Sitting Balance 

Good

Dynamic Sitting Balance 

Fair

Standing Static Balance 

Poor plus

Additional Balance Assessments Comments 

lateral weight shifts, emphasis on L weight-shift

Balance Training Static or Dynamic Start Time 

11:25

Balance Training Static or Dynamic End Time 

11:40

Total Balance Training Time 

15 min (8-127)

Safety Judgement 

Poor

Activity Tolerance 

Good

Equipment Present 

A pump

Mcqueen Catheter

Post Treatment Pain Intensity 

5 0-10

Variance Documentation 

SEE PT NOTE

Total Treament Time 

45 min (8-127)

Total Minutes 

45

Total Units 

3

PT Technical Record Comment 

S: Pt reported R shoulder and neck pain, agreeable to PT session.



O: MARTHA Muñoz cleared pt for PT session. Pt received semi-supine in bed, 

vitals assessed and stable. Pt participated in interventions above 

including therapeutic exercise per chart, STS x 6 using platform walker, 

and standing balance exercises. Noted with sit to stand, pt progressed 

from maxA x 2 to require Hank x 2 following verbal cues though continues 

to have difficulty achieving erect posture (better this date), and 

excessive R lean. Pt returned to bed, all needs in reach, CNA attending to 

pt, condom catheter dislocated during session, CNA aware. 



A: Pt with improved sit to stand ability with use of platform walker, 

pulling to stand. Appears to mitigate pt's retropulsive tendency. Due to 

poor posture, R pusher's syndrome, and fear of falling, pt with difficulty 

maintaining static standing, though able to with mod-Hank this date. Pt 

slowly progressing overall in mobility, good activity tolerance. Continue 

to recommend d/c to post acute setting once cleared by MD.



P: Continue c PT POC

## 2019-01-31 NOTE — NUR
CASE MANAGEMENT NOTE 

PT HAS BEEN ACCEPTED AT [facility] AS PER [facility] AND MAY TRANSFER AT THE CHANGE OF SHIFT,MARTHA CEE AND 
CHARGE MARTHA NUGENT MADE AWARE, ALSO CM CALLED AND NOTIFIED PT'S WIFE AND SHE IS AGREEABLE TO 
THE TRANSFER.

## 2019-02-01 VITALS — RESPIRATION RATE: 18 BRPM | DIASTOLIC BLOOD PRESSURE: 70 MMHG | SYSTOLIC BLOOD PRESSURE: 113 MMHG | HEART RATE: 96 BPM

## 2019-02-01 VITALS — SYSTOLIC BLOOD PRESSURE: 118 MMHG | DIASTOLIC BLOOD PRESSURE: 68 MMHG | HEART RATE: 82 BPM | RESPIRATION RATE: 18 BRPM

## 2019-02-01 VITALS — DIASTOLIC BLOOD PRESSURE: 65 MMHG | RESPIRATION RATE: 19 BRPM | HEART RATE: 83 BPM | SYSTOLIC BLOOD PRESSURE: 115 MMHG

## 2019-02-01 VITALS — RESPIRATION RATE: 18 BRPM | SYSTOLIC BLOOD PRESSURE: 107 MMHG | DIASTOLIC BLOOD PRESSURE: 63 MMHG

## 2019-02-01 VITALS — HEART RATE: 88 BPM

## 2019-02-01 LAB
ADD MAN DIFF?: NO
ADD UMIC: YES
ANION GAP: 12 (ref 5–13)
BASOPHIL #: 0.1 10^3/UL (ref 0–0.1)
BASOPHILS %: 0.8 % (ref 0–2)
BLOOD UREA NITROGEN: 16 MG/DL (ref 7–20)
CALCIUM: 9.8 MG/DL (ref 8.4–10.2)
CARBON DIOXIDE: 29 MMOL/L (ref 21–31)
CHLORIDE: 99 MMOL/L (ref 97–110)
CREATININE: 0.67 MG/DL (ref 0.61–1.24)
EOSINOPHILS #: 0.3 10^3/UL (ref 0–0.5)
EOSINOPHILS %: 3.2 % (ref 0–7)
GLUCOSE: 76 MG/DL (ref 70–220)
HEMATOCRIT: 41.6 % (ref 42–52)
HEMOGLOBIN: 13.6 G/DL (ref 14–18)
IMMATURE GRANS #M: 0.07 10^3/UL (ref 0–0.03)
IMMATURE GRANS % (M): 0.7 % (ref 0–0.43)
LYMPHOCYTES #: 2 10^3/UL (ref 0.8–2.9)
LYMPHOCYTES %: 20.2 % (ref 15–51)
MEAN CORPUSCULAR HEMOGLOBIN: 30 PG (ref 29–33)
MEAN CORPUSCULAR HGB CONC: 32.7 G/DL (ref 32–37)
MEAN CORPUSCULAR VOLUME: 91.8 FL (ref 82–101)
MEAN PLATELET VOLUME: 8.6 FL (ref 7.4–10.4)
MONOCYTE #: 1.3 10^3/UL (ref 0.3–0.9)
MONOCYTES %: 13 % (ref 0–11)
NEUTROPHIL #: 6.2 10^3/UL (ref 1.6–7.5)
NEUTROPHILS %: 62.1 % (ref 39–77)
NUCLEATED RED BLOOD CELLS #: 0 10^3/UL (ref 0–0)
NUCLEATED RED BLOOD CELLS%: 0 /100WBC (ref 0–0)
PLATELET COUNT: 413 10^3/UL (ref 140–415)
POTASSIUM: 3.9 MMOL/L (ref 3.5–5.1)
RED BLOOD COUNT: 4.53 10^6/UL (ref 4.7–6.1)
RED CELL DISTRIBUTION WIDTH: 12.1 % (ref 11.5–14.5)
SODIUM: 140 MMOL/L (ref 135–144)
UR ASCORBIC ACID: NEGATIVE MG/DL
UR BILIRUBIN (DIP): NEGATIVE MG/DL
UR BLOOD (DIP): (no result) MG/DL
UR CLARITY: CLEAR
UR COLOR: YELLOW
UR GLUCOSE (DIP): NEGATIVE MG/DL
UR KETONES (DIP): NEGATIVE MG/DL
UR LEUKOCYTE ESTERASE (DIP): NEGATIVE LEU/UL
UR MUCUS: (no result) /HPF
UR NITRITE (DIP): NEGATIVE MG/DL
UR PH (DIP): 6 (ref 5–9)
UR RBC: 2 /HPF (ref 0–5)
UR SPECIFIC GRAVITY (DIP): 1.01 (ref 1–1.03)
UR TOTAL PROTEIN (DIP): NEGATIVE MG/DL
UR UROBILINOGEN (DIP): NEGATIVE MG/DL
UR WBC: 4 /HPF (ref 0–5)
WHITE BLOOD COUNT: 10 10^3/UL (ref 4.8–10.8)

## 2019-02-01 PROCEDURE — F08Z0ZZ BATHING/SHOWERING TECHNIQUES TREATMENT: ICD-10-PCS

## 2019-02-01 PROCEDURE — F08Z0ZZ BATHING/SHOWERING TECHNIQUES TREATMENT: ICD-10-PCS | Performed by: PHYSICAL MEDICINE & REHABILITATION

## 2019-02-01 PROCEDURE — F08Z2ZZ GROOMING/PERSONAL HYGIENE TREATMENT: ICD-10-PCS

## 2019-02-01 PROCEDURE — F07Z5ZZ BED MOBILITY TREATMENT: ICD-10-PCS | Performed by: PHYSICAL MEDICINE & REHABILITATION

## 2019-02-01 PROCEDURE — F07Z8ZZ TRANSFER TRAINING TREATMENT: ICD-10-PCS | Performed by: PHYSICAL MEDICINE & REHABILITATION

## 2019-02-01 PROCEDURE — F08Z1ZZ DRESSING TECHNIQUES TREATMENT: ICD-10-PCS | Performed by: PHYSICAL MEDICINE & REHABILITATION

## 2019-02-01 PROCEDURE — F08Z1ZZ DRESSING TECHNIQUES TREATMENT: ICD-10-PCS

## 2019-02-01 PROCEDURE — F07Z9ZZ GAIT TRAINING/FUNCTIONAL AMBULATION TREATMENT: ICD-10-PCS | Performed by: PHYSICAL MEDICINE & REHABILITATION

## 2019-02-01 PROCEDURE — F07Z5ZZ BED MOBILITY TREATMENT: ICD-10-PCS

## 2019-02-01 PROCEDURE — F07Z9ZZ GAIT TRAINING/FUNCTIONAL AMBULATION TREATMENT: ICD-10-PCS

## 2019-02-01 PROCEDURE — F08Z2ZZ GROOMING/PERSONAL HYGIENE TREATMENT: ICD-10-PCS | Performed by: PHYSICAL MEDICINE & REHABILITATION

## 2019-02-01 PROCEDURE — F07Z8ZZ TRANSFER TRAINING TREATMENT: ICD-10-PCS

## 2019-02-01 RX ADMIN — ASPIRIN 1 MG: 81 TABLET, COATED ORAL at 09:24

## 2019-02-01 RX ADMIN — CARBIDOPA AND LEVODOPA 1 TAB: 25; 100 TABLET ORAL at 20:44

## 2019-02-01 RX ADMIN — MORPHINE SULFATE PRN MG: 10 SOLUTION ORAL at 20:43

## 2019-02-01 RX ADMIN — CARBIDOPA AND LEVODOPA SCH TAB: 25; 100 TABLET ORAL at 20:44

## 2019-02-01 RX ADMIN — MORPHINE SULFATE PRN MG: 10 SOLUTION ORAL at 11:17

## 2019-02-01 RX ADMIN — BISACODYL 1 MG: 5 TABLET, COATED ORAL at 09:24

## 2019-02-01 RX ADMIN — CARBIDOPA AND LEVODOPA 1 TAB: 25; 100 TABLET ORAL at 12:08

## 2019-02-01 RX ADMIN — LINAGLIPTIN 1 MG: 5 TABLET, FILM COATED ORAL at 08:23

## 2019-02-01 RX ADMIN — FAMOTIDINE SCH MG: 20 TABLET ORAL at 20:44

## 2019-02-01 RX ADMIN — LACTULOSE PRN GM: 10 SOLUTION ORAL at 09:24

## 2019-02-01 RX ADMIN — MORPHINE SULFATE 1 MG: 10 SOLUTION ORAL at 11:17

## 2019-02-01 RX ADMIN — LINAGLIPTIN SCH MG: 5 TABLET, FILM COATED ORAL at 08:23

## 2019-02-01 RX ADMIN — ATORVASTATIN CALCIUM SCH MG: 40 TABLET, FILM COATED ORAL at 20:43

## 2019-02-01 RX ADMIN — SENNOSIDES SCH TAB: 8.6 TABLET, FILM COATED ORAL at 20:44

## 2019-02-01 RX ADMIN — INSULIN ASPART 1 UNIT: 100 INJECTION, SOLUTION INTRAVENOUS; SUBCUTANEOUS at 17:35

## 2019-02-01 RX ADMIN — SENNOSIDES 1 TAB: 8.6 TABLET, FILM COATED ORAL at 20:44

## 2019-02-01 RX ADMIN — DOCUSATE SODIUM 1 MG: 100 CAPSULE, LIQUID FILLED ORAL at 20:44

## 2019-02-01 RX ADMIN — INSULIN ASPART 1 UNIT: 100 INJECTION, SOLUTION INTRAVENOUS; SUBCUTANEOUS at 12:00

## 2019-02-01 RX ADMIN — MAGNESIUM HYDROXIDE 1 ML: 400 SUSPENSION ORAL at 05:48

## 2019-02-01 RX ADMIN — DOCUSATE SODIUM 1 MG: 100 CAPSULE, LIQUID FILLED ORAL at 09:24

## 2019-02-01 RX ADMIN — INSULIN GLARGINE SCH UNITS: 100 INJECTION, SOLUTION SUBCUTANEOUS at 20:49

## 2019-02-01 RX ADMIN — LACTULOSE 1 GM: 20 SOLUTION ORAL at 09:24

## 2019-02-01 RX ADMIN — INSULIN GLARGINE 1 UNITS: 100 INJECTION, SOLUTION SUBCUTANEOUS at 20:49

## 2019-02-01 RX ADMIN — MORPHINE SULFATE 1 MG: 10 SOLUTION ORAL at 20:43

## 2019-02-01 RX ADMIN — FAMOTIDINE 1 MG: 20 TABLET ORAL at 20:44

## 2019-02-01 RX ADMIN — ACETAMINOPHEN 1 MG: 325 TABLET, FILM COATED ORAL at 22:09

## 2019-02-01 RX ADMIN — DOCUSATE SODIUM SCH MG: 100 CAPSULE, LIQUID FILLED ORAL at 20:44

## 2019-02-01 RX ADMIN — ASPIRIN SCH MG: 81 TABLET, COATED ORAL at 09:24

## 2019-02-01 RX ADMIN — MAGNESIUM HYDROXIDE 1 ML: 400 SUSPENSION ORAL at 20:48

## 2019-02-01 RX ADMIN — CARBIDOPA AND LEVODOPA SCH TAB: 25; 100 TABLET ORAL at 12:08

## 2019-02-01 RX ADMIN — FAMOTIDINE SCH MG: 20 TABLET ORAL at 09:24

## 2019-02-01 RX ADMIN — BISACODYL SCH MG: 5 TABLET, COATED ORAL at 09:24

## 2019-02-01 RX ADMIN — INSULIN ASPART 1 UNIT: 100 INJECTION, SOLUTION INTRAVENOUS; SUBCUTANEOUS at 07:35

## 2019-02-01 RX ADMIN — FAMOTIDINE 1 MG: 20 TABLET ORAL at 09:24

## 2019-02-01 RX ADMIN — CARBIDOPA AND LEVODOPA 1 TAB: 25; 100 TABLET ORAL at 09:24

## 2019-02-01 RX ADMIN — CARBIDOPA AND LEVODOPA SCH TAB: 25; 100 TABLET ORAL at 09:24

## 2019-02-01 RX ADMIN — DOCUSATE SODIUM SCH MG: 100 CAPSULE, LIQUID FILLED ORAL at 09:24

## 2019-02-01 RX ADMIN — INSULIN ASPART 1 UNIT: 100 INJECTION, SOLUTION INTRAVENOUS; SUBCUTANEOUS at 21:00

## 2019-02-01 RX ADMIN — ATORVASTATIN CALCIUM 1 MG: 40 TABLET, FILM COATED ORAL at 20:43

## 2019-02-01 NOTE — CONS
DATE OF ADMISSION: 01/31/2019

DATE OF CONSULTATION:  02/01/2019

 

 

 

REHABILITATION POST-ADMISSION PHYSICIAN EVALUATION 

 

REHABILITATION IMPAIRMENT CATEGORY:  Left corona radiata frontal infarct 

cerebrovascular accident with right-sided weakness.

 

ACTIVE COMORBIDITIES:

1.  Parkinson's.

2.  Diabetes mellitus.

3.  Bilateral common carotid artery stenosis.

4.  Urinary tract infection.

5.  Dysphagia

6.  Impairments in self-care, mobility and cognition.

 

HISTORY OF PRESENT ILLNESS:  The patient is a pleasant 78-year-old right-handed 

gentleman with a history of Parkinson's disease, diabetes mellitus who was noted

to have sudden onset of right-sided weakness.  Workup included a head CT which 

was negative for bleed.  The patient was outside of the window for tPA.  A 

followup MRI did demonstrate left corona radiata and frontal infarct 

cerebrovascular accident.  The patient noted to have dysphasia and significant 

impairments in self-care and mobility as compared to baseline.  The patient has 

been cleared to transfer to the rehabilitation unit for comprehensive 

interdisciplinary rehab care.

 

FUNCTIONAL HISTORY:  Prior to recent events, he was independent in self-care 

tasks and mobility.  Currently, he requires maximal assistance for self-care and

mobility tasks.

 

I have reviewed the preadmission screen and the patient's current functional 

status is consistent with the preadmission screen.

 

FAMILY AND SOCIAL HISTORY:  The patient reportedly lives at home with family and

hopes to return there upon discharge.

 

PAST MEDICAL HISTORY:

1.  Parkinson disease.

2.  Diabetes mellitus.

 

CURRENT MEDICATIONS:

1.  Aspirin 81 mg p.o. daily.

2.  Lipitor 40 mg p.o. at bedtime.

3.  Catapres 0.1 mg p.r.n.

4.  Insulin sliding scale.

5.  Lantus 10 units subcu daily.

6.  Sinemet 1 tab p.o. t.i.d.

7.  Tradjenta 5 mg p.o. daily.

8.  Glucophage 500 mg p.o. b.i.d.

 

ALLERGIES:  Patient with no known drug allergies.

 

PHYSICAL EXAMINATION:

VITAL SIGNS:  He is currently afebrile with stable vital signs.

HEENT:  The extraocular motions are intact.  Oropharynx clear.

NECK:  Supple.

LUNGS:  Clear anteriorly.

CARDIAC:  S1, S2.

ABDOMEN:  Soft, nontender, positive bowel sounds.

NEUROLOGIC:  He is awake and alert.  He is oriented x1.  He will follow simple 

1-step commands.  He has impaired short-term memory.  He demonstrates good 

strength in the left upper extremity, antigravity strength in the right.  Good 

strength in the left lower extremity, antigravity strength in the right lower 

extremity.  He has notable tremor.  He has impaired static and dynamic balance.

 

PLAN:  The patient has been admitted for comprehensive interdisciplinary acute 

rehab and is anticipated to tolerate 3 hours of daily therapy in divided doses 

for at least 5/7 days a week.  The treatment plan will include:

1.  Physical therapy to focus on bed mobility, transfers, and household 

ambulation with the goal of having the patient reach a standby assist level.

2.  Occupational therapy to focus on hygiene, grooming, dressing, bathing, and 

toileting activities with goal of having the patient reach a standby assist 

level.

3.  The patient would benefit from a comprehensive interdisciplinary cognitive 

program with physical therapy and occupational therapy for functional carryover 

of cognitive tasks.  The patient would also benefit from neuropsychological 

evaluation and oversight of cognitive program.  Patient would benefit from 

speech therapy for full cognitive assessment in addition to dysphagia evaluation

with the goal of having the patient return to baseline cognition and meet 

nutritional needs by mouth.

4. Rehab Nursing for carry over of functional task, goal of continent of bowel 

and bladder and paitent/family education with regards to the aforementioned 

issues.

 

REHABILITATION BARRIER:  Cognition.

 

INTERVENTION FOR BARRIER:  Cognitive interdisciplinary program.

 

ESTIMATED LENGTH OF STAY:  14 days.

 

DISPOSITION GOAL:  Home.

 

I acknowledge that I performed a full physical examination on this patient 

within 24 hours of admission to the rehabilitation unit.  I believe the patient 

would benefit from comprehensive interdisciplinary rehab care and is anticipated

to make reasonable goals in a reasonable period of time as outlined above.

 

 

Dictated By: MARS GARCIA/NTS

DD:    02/01/2019 10:58:25

DT:    02/01/2019 12:30:06

Conf#: [id number]

DID#:  [id number]

CC: MARS POMPA MD;*EndCC*

MTDD

## 2019-02-01 NOTE — PN
Date/Time of Note


Date/Time of Note


DATE: 2/1/19 


TIME: 18:24





Assessment/Plan


VTE Prophylaxis


Risk score (from Ns)>0 risk:  4


SCD applied (from [facility]  Yes


SCD contraindicated:  other


Pharmacological prophylaxis:  other





Lines/Catheters


IV Catheter Type (from [facility]):  Peripheral IV


Urinary Cath still in place:  Yes (Condom Catheter)


Reason Cath still needed:  urinary retention





Assessment/Plan


Assessment/Plan


-Acute stroke with right-sided weakness.  Continue aspirin and Lipitor.  


Continue PT and OT.  S/p evaluation by  Dr. Braden  in neurology 


consultation.  


-Encephalopathy secondary to acute stroke


-Diabetes mellitus type 2 with hemoglobin A1c 8.1.  Continue metformin and 


Tradjenta, NovoLog per mild algorithm sliding scale.


-Parkinson's disease, continue Sinemet.


-S/p UTI, completed treatment with Rocephin





Further recommendations based on clinical course.  Plan of care discussed with 


Dr. Win.


Result Diagram:  


2/1/19 0543                                                                     


          2/1/19 0543





Results 24hrs





Laboratory Tests


Test
                   1/31/19
21:00  1/31/19
23:01  2/1/19
05:43  2/1/19
08:20


Urine Color             YELLOW


Urine Clarity           CLEAR


Urine pH                        6.0


Urine Specific Gravity        1.010


Urine Ketones           NEGATIVE


Urine Nitrite           NEGATIVE


Urine Bilirubin         NEGATIVE


Urine Urobilinogen      NEGATIVE


Urine Leukocyte         NEGATIVE


Esterase


Urine Microscopic RBC             2


Urine Microscopic WBC             4


Urine Mucus             FEW  A


Urine Hemoglobin                1+  H


Urine Glucose           NEGATIVE


Urine Total Protein     NEGATIVE


Bedside Glucose                                 85                          90


White Blood Count                                           10.0


Red Blood Count                                            4.53  L


Hemoglobin                                                 13.6  L


Hematocrit                                                 41.6  L


Mean Corpuscular                                            91.8


Volume


Mean Corpuscular                                            30.0


Hemoglobin


Mean Corpuscular        
              
                   32.7  
  



Hemoglobin
Concent


Red Cell Distribution                                       12.1


Width


Platelet Count                                               413


Mean Platelet Volume                                         8.6


Immature Granulocytes                                     0.700  H


%


Neutrophils %                                               62.1


Lymphocytes %                                               20.2


Monocytes %                                                13.0  H


Eosinophils %                                                3.2


Basophils %                                                  0.8


Nucleated Red Blood                                          0.0


Cells %


Immature Granulocytes                                     0.070  H


#


Neutrophils #                                                6.2


Lymphocytes #                                                2.0


Monocytes #                                                 1.3  H


Eosinophils #                                                0.3


Basophils #                                                  0.1


Nucleated Red Blood                                          0.0


Cells #


Sodium Level                                                 140


Potassium Level                                              3.9


Chloride Level                                                99


Carbon Dioxide Level                                          29


Anion Gap                                                     12


Blood Urea Nitrogen                                           16


Creatinine                                                  0.67


Est Glomerular Filtrat  
              
                
           



Rate
mL/min


Glucose Level                                                 76


Calcium Level                                                9.8


Test
                    2/1/19
12:03   2/1/19
17:33  
             



Bedside Glucose                 126            106








Subjective


24 Hr Interval Summary


Free Text/Dictation


-afebrile


-gets PT.OT - 


- stable


no new issues reported last night


Constitutional:  requiring O2


ENT:  no complaints


Respiratory:  no complaints


Cardiovascular:  no complaints


Gastrointestinal:  no complaints


Musculoskeletal:  restricted range of motion





Exam/Review of Systems


Exam


Vitals





Vital Signs


  Date      Temp  Pulse  Resp  B/P (MAP)   Pulse Ox  O2          O2 Flow    FiO2


Time                                                 Delivery    Rate


    2/1/19  97.6     96    18      113/70        91  Room Air


     14:00                           (84)








Intake and Output





1/31/19 1/31/19 2/1/19





1515:00


23:00


07:00





IntakeIntake Total


450 ml





OutputOutput Total


800 ml





BalanceBalance


-350 ml











Constitutional:  alert, frail


Psych:  nl mood/affect


Eyes:  nl lids, nl sclera


Respiratory:  diminished breath sounds


Cardiovascular:  nl pulses


Gastrointestinal:  soft, non-tender


Musculoskeletal:  muscle weakness


Extremities:  normal pulses


Neurological:  other





Results


Results 24hrs





Laboratory Tests


Test
                   1/31/19
21:00  1/31/19
23:01  2/1/19
05:43  2/1/19
08:20


Urine Color             YELLOW


Urine Clarity           CLEAR


Urine pH                        6.0


Urine Specific Gravity        1.010


Urine Ketones           NEGATIVE


Urine Nitrite           NEGATIVE


Urine Bilirubin         NEGATIVE


Urine Urobilinogen      NEGATIVE


Urine Leukocyte         NEGATIVE


Esterase


Urine Microscopic RBC             2


Urine Microscopic WBC             4


Urine Mucus             FEW  A


Urine Hemoglobin                1+  H


Urine Glucose           NEGATIVE


Urine Total Protein     NEGATIVE


Bedside Glucose                                 85                          90


White Blood Count                                           10.0


Red Blood Count                                            4.53  L


Hemoglobin                                                 13.6  L


Hematocrit                                                 41.6  L


Mean Corpuscular                                            91.8


Volume


Mean Corpuscular                                            30.0


Hemoglobin


Mean Corpuscular        
              
                   32.7  
  



Hemoglobin
Concent


Red Cell Distribution                                       12.1


Width


Platelet Count                                               413


Mean Platelet Volume                                         8.6


Immature Granulocytes                                     0.700  H


%


Neutrophils %                                               62.1


Lymphocytes %                                               20.2


Monocytes %                                                13.0  H


Eosinophils %                                                3.2


Basophils %                                                  0.8


Nucleated Red Blood                                          0.0


Cells %


Immature Granulocytes                                     0.070  H


#


Neutrophils #                                                6.2


Lymphocytes #                                                2.0


Monocytes #                                                 1.3  H


Eosinophils #                                                0.3


Basophils #                                                  0.1


Nucleated Red Blood                                          0.0


Cells #


Sodium Level                                                 140


Potassium Level                                              3.9


Chloride Level                                                99


Carbon Dioxide Level                                          29


Anion Gap                                                     12


Blood Urea Nitrogen                                           16


Creatinine                                                  0.67


Est Glomerular Filtrat  
              
                
           



Rate
mL/min


Glucose Level                                                 76


Calcium Level                                                9.8


Test
                    2/1/19
12:03   2/1/19
17:33  
             



Bedside Glucose                 126            106








Medications


Medication





Current Medications


Aspirin (Halfprin) 81 mg DAILY PO  Last administered on 2/1/19at 09:24; Admin 


Dose 81 MG;  Start 2/1/19 at 09:00


Atorvastatin Calcium (Lipitor) 40 mg DAILY@21 PO ;  Start 1/31/19 at 22:30


Bisacodyl (Dulcolax) 10 mg DAILY PO  Last administered on 2/1/19at 09:24; Admin 


Dose 10 MG;  Start 2/1/19 at 09:00


Clonidine (Catapres) 0.1 mg Q6H  PRN PO ELEVATED BLOOD PRESSURE;  Start 1/31/19 


at 23:00


Miscellaneous Information 1 ea NOTE XX ;  Start 1/31/19 at 23:00


Glucose (Glutose) 15 gm Q15M  PRN PO DECREASED GLUCOSE;  Start 1/31/19 at 23:00


Glucose (Glutose) 22.5 gm Q15M  PRN PO DECREASED GLUCOSE;  Start 1/31/19 at 


23:00


Dextrose (D50w Syringe) 25 ml Q15M  PRN IV DECREASED GLUCOSE;  Start 1/31/19 at 


23:00


Dextrose (D50w Syringe) 50 ml Q15M  PRN IV DECREASED GLUCOSE;  Start 1/31/19 at 


23:00


Glucagon (Glucagen) 1 mg Q15M  PRN IM DECREASED GLUCOSE;  Start 1/31/19 at 23:00


Glucose (Glutose) 15 gm Q15M  PRN BUCCAL DECREASED GLUCOSE;  Start 1/31/19 at 


23:00


Docusate Sodium (Colace) 100 mg BID PO  Last administered on 2/1/19at 09:24; 


Admin Dose 100 MG;  Start 1/31/19 at 22:30


Famotidine (Pepcid) 20 mg Q12 PO  Last administered on 2/1/19at 09:24; Admin 


Dose 20 MG;  Start 1/31/19 at 22:30


Insulin Aspart (Novolog Insulin Pen) (Adult SC Insulin - Moder... WITH MEALS  


BEDTIME SC ;  Start 1/31/19 at 22:30


Diagnostic Test (Pha) (Accu-Chek) 1 ea 02 XX ;  Start 2/1/19 at 02:00


Insulin Glargine (Lantus) 10 units DAILY@2000 SC ;  Start 1/31/19 at 23:00


Carbidopa/Levodopa (Sinemet (25/ 100)) 1 tab TID PO  Last administered on 


2/1/19at 12:08; Admin Dose 1 TAB;  Start 1/31/19 at 22:30


Linagliptin (Tradjenta) 5 mg DAILY PO  Last administered on 2/1/19at 08:23; 


Admin Dose 5 MG;  Start 2/1/19 at 09:00


Metformin HCl (Glucophage) 500 mg BID WITH  MEALS PO  Last administered on 


2/1/19at 17:36; Admin Dose 500 MG;  Start 2/1/19 at 07:35


Morphine Sulfate (morphine) 6 mg Q4H  PRN PO SEVERE PAIN LEVEL 7-10 Last adminis


tered on 2/1/19at 11:17; Admin Dose 6 MG;  Start 1/31/19 at 22:40


Ondansetron HCl (Zofran Inj) 4 mg Q6H  PRN IV NAUSEA AND/OR VOMITING;  Start 


1/31/19 at 23:00


Senna (Senokot) 1 tab HS PO ;  Start 2/1/19 at 21:00


Magnesium Hydroxide (Milk Of Mag) 30 ml BID  PRN PO CONSTIPATION Last 


administered on 2/1/19at 05:48; Admin Dose 30 ML;  Start 2/1/19 at 00:00


Lactulose (Enulose) 20 gm DAILY  PRN PO CONSTIPATION Last administered on 


2/1/19at 09:24; Admin Dose 20 GM;  Start 2/1/19 at 00:00


Acetaminophen (Tylenol Tab) 650 mg Q4H  PRN PO PAIN;  Start 2/1/19 at 00:00


Miscellaneous Information (Pending Santyl Order For Wound Care) This patient 


ha... PRN  PRN XX WOUND CARE;  Start 2/1/19 at 00:00











BERYL GARCIA                  Feb 1, 2019 18:56

## 2019-02-02 VITALS — RESPIRATION RATE: 18 BRPM | HEART RATE: 99 BPM | SYSTOLIC BLOOD PRESSURE: 126 MMHG | DIASTOLIC BLOOD PRESSURE: 69 MMHG

## 2019-02-02 VITALS — HEART RATE: 93 BPM | SYSTOLIC BLOOD PRESSURE: 134 MMHG | DIASTOLIC BLOOD PRESSURE: 69 MMHG | RESPIRATION RATE: 18 BRPM

## 2019-02-02 VITALS — DIASTOLIC BLOOD PRESSURE: 65 MMHG | SYSTOLIC BLOOD PRESSURE: 112 MMHG | RESPIRATION RATE: 18 BRPM | HEART RATE: 86 BPM

## 2019-02-02 VITALS — DIASTOLIC BLOOD PRESSURE: 68 MMHG | SYSTOLIC BLOOD PRESSURE: 136 MMHG | HEART RATE: 79 BPM | RESPIRATION RATE: 19 BRPM

## 2019-02-02 RX ADMIN — CARBIDOPA AND LEVODOPA 1 TAB: 25; 100 TABLET ORAL at 08:39

## 2019-02-02 RX ADMIN — BISACODYL 1 MG: 5 TABLET, COATED ORAL at 08:39

## 2019-02-02 RX ADMIN — MORPHINE SULFATE 1 MG: 10 SOLUTION ORAL at 21:56

## 2019-02-02 RX ADMIN — DOCUSATE SODIUM 1 MG: 100 CAPSULE, LIQUID FILLED ORAL at 08:39

## 2019-02-02 RX ADMIN — INSULIN GLARGINE 1 UNITS: 100 INJECTION, SOLUTION SUBCUTANEOUS at 19:58

## 2019-02-02 RX ADMIN — LINAGLIPTIN SCH MG: 5 TABLET, FILM COATED ORAL at 07:51

## 2019-02-02 RX ADMIN — POLYETHYLENE GLYCOL 3350 1 GM: 17 POWDER, FOR SOLUTION ORAL at 12:31

## 2019-02-02 RX ADMIN — LACTULOSE 1 GM: 20 SOLUTION ORAL at 08:40

## 2019-02-02 RX ADMIN — FAMOTIDINE 1 MG: 20 TABLET ORAL at 20:01

## 2019-02-02 RX ADMIN — CARBIDOPA AND LEVODOPA SCH TAB: 25; 100 TABLET ORAL at 08:39

## 2019-02-02 RX ADMIN — INSULIN ASPART 1 UNIT: 100 INJECTION, SOLUTION INTRAVENOUS; SUBCUTANEOUS at 20:09

## 2019-02-02 RX ADMIN — MORPHINE SULFATE PRN MG: 10 SOLUTION ORAL at 17:40

## 2019-02-02 RX ADMIN — LINAGLIPTIN 1 MG: 5 TABLET, FILM COATED ORAL at 07:51

## 2019-02-02 RX ADMIN — BISACODYL SCH MG: 5 TABLET, COATED ORAL at 08:39

## 2019-02-02 RX ADMIN — INSULIN ASPART 1 UNIT: 100 INJECTION, SOLUTION INTRAVENOUS; SUBCUTANEOUS at 17:40

## 2019-02-02 RX ADMIN — CARBIDOPA AND LEVODOPA 1 TAB: 25; 100 TABLET ORAL at 20:09

## 2019-02-02 RX ADMIN — INSULIN ASPART 1 UNIT: 100 INJECTION, SOLUTION INTRAVENOUS; SUBCUTANEOUS at 12:33

## 2019-02-02 RX ADMIN — DOCUSATE SODIUM SCH MG: 100 CAPSULE, LIQUID FILLED ORAL at 20:01

## 2019-02-02 RX ADMIN — FAMOTIDINE SCH MG: 20 TABLET ORAL at 20:01

## 2019-02-02 RX ADMIN — LACTULOSE PRN GM: 10 SOLUTION ORAL at 08:40

## 2019-02-02 RX ADMIN — ASPIRIN SCH MG: 81 TABLET, COATED ORAL at 08:39

## 2019-02-02 RX ADMIN — CARBIDOPA AND LEVODOPA SCH TAB: 25; 100 TABLET ORAL at 12:31

## 2019-02-02 RX ADMIN — ATORVASTATIN CALCIUM 1 MG: 40 TABLET, FILM COATED ORAL at 20:02

## 2019-02-02 RX ADMIN — ACETAMINOPHEN 1 MG: 325 TABLET, FILM COATED ORAL at 20:00

## 2019-02-02 RX ADMIN — CARBIDOPA AND LEVODOPA 1 TAB: 25; 100 TABLET ORAL at 12:31

## 2019-02-02 RX ADMIN — ASPIRIN 1 MG: 81 TABLET, COATED ORAL at 08:39

## 2019-02-02 RX ADMIN — MORPHINE SULFATE PRN MG: 10 SOLUTION ORAL at 21:56

## 2019-02-02 RX ADMIN — FAMOTIDINE 1 MG: 20 TABLET ORAL at 08:39

## 2019-02-02 RX ADMIN — MORPHINE SULFATE 1 MG: 10 SOLUTION ORAL at 17:40

## 2019-02-02 RX ADMIN — ACETAMINOPHEN 1 MG: 325 TABLET, FILM COATED ORAL at 08:40

## 2019-02-02 RX ADMIN — SENNOSIDES SCH TAB: 8.6 TABLET, FILM COATED ORAL at 20:00

## 2019-02-02 RX ADMIN — CARBIDOPA AND LEVODOPA SCH TAB: 25; 100 TABLET ORAL at 20:09

## 2019-02-02 RX ADMIN — ATORVASTATIN CALCIUM SCH MG: 40 TABLET, FILM COATED ORAL at 20:02

## 2019-02-02 RX ADMIN — INSULIN GLARGINE SCH UNITS: 100 INJECTION, SOLUTION SUBCUTANEOUS at 19:58

## 2019-02-02 RX ADMIN — INSULIN ASPART 1 UNIT: 100 INJECTION, SOLUTION INTRAVENOUS; SUBCUTANEOUS at 07:35

## 2019-02-02 RX ADMIN — SENNOSIDES 1 TAB: 8.6 TABLET, FILM COATED ORAL at 20:00

## 2019-02-02 RX ADMIN — FAMOTIDINE SCH MG: 20 TABLET ORAL at 08:39

## 2019-02-02 RX ADMIN — DOCUSATE SODIUM 1 MG: 100 CAPSULE, LIQUID FILLED ORAL at 20:01

## 2019-02-02 RX ADMIN — DOCUSATE SODIUM SCH MG: 100 CAPSULE, LIQUID FILLED ORAL at 08:39

## 2019-02-02 NOTE — PN
Date/Time of Note


Date/Time of Note


DATE: 2/2/19 


TIME: 09:34





Subjective


More alert today





Objective





Vital Signs


  Date      Temp  Pulse  Resp  B/P (MAP)   Pulse Ox  O2          O2 Flow    FiO2


Time                                                 Delivery    Rate


    2/2/19  97.4     79    19      136/68        96  Room Air


     07:00                           (90)








Intake and Output





2/1/19 2/1/19 2/2/19





1515:00


23:00


07:00





IntakeIntake Total


50 ml


780 ml


750 ml





OutputOutput Total


300 ml





BalanceBalance


50 ml


480 ml


750 ml











Exam


pulm-cta


max/dep transfer





Results/Medications


Result Diagram:  


2/1/19 0543                                                                     


          2/1/19 0543





Results 24 hrs





Laboratory Tests


    Test
            2/1/19
12:03  2/1/19
17:33  2/1/19
20:33  2/2/19
07:45


    Bedside Glucose         126           106           162           110





Medications





Current Medications


Aspirin (Halfprin) 81 mg DAILY PO  Last administered on 2/2/19at 08:39; Admin 


Dose 81 MG;  Start 2/1/19 at 09:00


Atorvastatin Calcium (Lipitor) 40 mg DAILY@21 PO  Last administered on 2/1/19at 


20:43; Admin Dose 40 MG;  Start 1/31/19 at 22:30


Bisacodyl (Dulcolax) 10 mg DAILY PO  Last administered on 2/2/19at 08:39; Admin 


Dose 10 MG;  Start 2/1/19 at 09:00


Clonidine (Catapres) 0.1 mg Q6H  PRN PO ELEVATED BLOOD PRESSURE;  Start 1/31/19 


at 23:00


Miscellaneous Information 1 ea NOTE XX ;  Start 1/31/19 at 23:00


Glucose (Glutose) 15 gm Q15M  PRN PO DECREASED GLUCOSE;  Start 1/31/19 at 23:00


Glucose (Glutose) 22.5 gm Q15M  PRN PO DECREASED GLUCOSE;  Start 1/31/19 at 


23:00


Dextrose (D50w Syringe) 25 ml Q15M  PRN IV DECREASED GLUCOSE;  Start 1/31/19 at 


23:00


Dextrose (D50w Syringe) 50 ml Q15M  PRN IV DECREASED GLUCOSE;  Start 1/31/19 at 


23:00


Glucagon (Glucagen) 1 mg Q15M  PRN IM DECREASED GLUCOSE;  Start 1/31/19 at 23:00


Glucose (Glutose) 15 gm Q15M  PRN BUCCAL DECREASED GLUCOSE;  Start 1/31/19 at 


23:00


Docusate Sodium (Colace) 100 mg BID PO  Last administered on 2/2/19at 08:39; 


Admin Dose 100 MG;  Start 1/31/19 at 22:30


Famotidine (Pepcid) 20 mg Q12 PO  Last administered on 2/2/19at 08:39; Admin 


Dose 20 MG;  Start 1/31/19 at 22:30


Insulin Aspart (Novolog Insulin Pen) (Adult SC Insulin - Moder... WITH MEALS  


BEDTIME SC ;  Start 1/31/19 at 22:30


Diagnostic Test (Pha) (Accu-Chek) 1 ea 02 XX ;  Start 2/1/19 at 02:00


Insulin Glargine (Lantus) 10 units DAILY@2000 SC  Last administered on 2/1/19at 


20:49; Admin Dose 10 UNITS;  Start 1/31/19 at 23:00


Carbidopa/Levodopa (Sinemet (25/ 100)) 1 tab TID PO  Last administered on 


2/2/19at 08:39; Admin Dose 1 TAB;  Start 1/31/19 at 22:30


Linagliptin (Tradjenta) 5 mg DAILY PO  Last administered on 2/2/19at 07:51; 


Admin Dose 5 MG;  Start 2/1/19 at 09:00


Metformin HCl (Glucophage) 500 mg BID WITH  MEALS PO  Last administered on 2/ 2/19at 07:51; Admin Dose 500 MG;  Start 2/1/19 at 07:35


Morphine Sulfate (morphine) 6 mg Q4H  PRN PO SEVERE PAIN LEVEL 7-10 Last 


administered on 2/1/19at 20:43; Admin Dose 6 MG;  Start 1/31/19 at 22:40


Ondansetron HCl (Zofran Inj) 4 mg Q6H  PRN IV NAUSEA AND/OR VOMITING;  Start 


1/31/19 at 23:00


Senna (Senokot) 1 tab HS PO  Last administered on 2/1/19at 20:44; Admin Dose 1 


TAB;  Start 2/1/19 at 21:00


Magnesium Hydroxide (Milk Of Mag) 30 ml BID  PRN PO CONSTIPATION Last 


administered on 2/1/19at 20:48; Admin Dose 30 ML;  Start 2/1/19 at 00:00


Lactulose (Enulose) 20 gm DAILY  PRN PO CONSTIPATION Last administered on 


2/2/19at 08:40; Admin Dose 20 GM;  Start 2/1/19 at 00:00


Acetaminophen (Tylenol Tab) 650 mg Q4H  PRN PO PAIN Last administered on 


2/2/19at 08:40; Admin Dose 650 MG;  Start 2/1/19 at 00:00


Miscellaneous Information (Pending [facility] Order For Wound Care) This patient 


ha... PRN  PRN XX WOUND CARE;  Start 2/1/19 at 00:00





Assessment/Plan


Additional Assessment/Plan


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


sided weakness; Parkinsons


   Continue rehab program 


Diabetes mellitus.


Bilateral common carotid artery stenosis.


Urinary tract infection.


Dysphagia











MARS POMPA MD              Feb 2, 2019 09:35

## 2019-02-02 NOTE — PN
Date/Time of Note


Date/Time of Note


DATE: 2/2/19 


TIME: 17:56





Assessment/Plan


VTE Prophylaxis


Risk score (from Nsg)>0 risk:  4


SCD applied (from Nsg):  Yes





Lines/Catheters


IV Catheter Type (from Nrsg):  Peripheral IV


Urinary Cath still in place:  No (condom catheter discontinued)





Assessment/Plan


Assessment/Plan


-Acute stroke with right-sided weakness.  Continue aspirin and Lipitor.  


Continue PT and OT.  S/p evaluation by  Dr. Braden  in neurology 


consultation.  


-Encephalopathy secondary to acute stroke


-Diabetes mellitus type 2 with hemoglobin A1c 8.1.  Continue metformin and 


Tradjenta, NovoLog per mild algorithm sliding scale.


-Parkinson's disease, continue Sinemet.


-S/p UTI, completed treatment with Rocephin





Further recommendations based on clinical course.  Plan of care discussed with 


Dr. Win.


Result Diagram:  


2/1/19 0543                                                                     


          2/1/19 0543





Results 24hrs





Laboratory Tests


    Test
            2/1/19
20:33  2/2/19
07:45  2/2/19
12:25  2/2/19
17:31


    Bedside Glucose         162           110           171           143








Exam/Review of Systems


Exam


Vitals





Vital Signs


  Date      Temp  Pulse  Resp  B/P (MAP)   Pulse Ox  O2          O2 Flow    FiO2


Time                                                 Delivery    Rate


    2/2/19  97.3     99    18      126/69        95  Room Air


     14:00                           (88)








Intake and Output





2/1/19 2/1/19 2/2/19





1515:00


23:00


07:00





IntakeIntake Total


50 ml


780 ml


750 ml





OutputOutput Total


300 ml





BalanceBalance


50 ml


480 ml


750 ml














Results


Results 24hrs





Laboratory Tests


    Test
            2/1/19
20:33  2/2/19
07:45  2/2/19
12:25  2/2/19
17:31


    Bedside Glucose         162           110           171           143








Medications


Medication





Current Medications


Aspirin (Halfprin) 81 mg DAILY PO  Last administered on 2/2/19at 08:39; Admin 


Dose 81 MG;  Start 2/1/19 at 09:00


Atorvastatin Calcium (Lipitor) 40 mg DAILY@21 PO  Last administered on 2/1/19at 


20:43; Admin Dose 40 MG;  Start 1/31/19 at 22:30


Bisacodyl (Dulcolax) 10 mg DAILY PO  Last administered on 2/2/19at 08:39; Admin 


Dose 10 MG;  Start 2/1/19 at 09:00


Clonidine (Catapres) 0.1 mg Q6H  PRN PO ELEVATED BLOOD PRESSURE;  Start 1/31/19 


at 23:00


Miscellaneous Information 1 ea NOTE XX ;  Start 1/31/19 at 23:00


Glucose (Glutose) 15 gm Q15M  PRN PO DECREASED GLUCOSE;  Start 1/31/19 at 23:00


Glucose (Glutose) 22.5 gm Q15M  PRN PO DECREASED GLUCOSE;  Start 1/31/19 at 


23:00


Dextrose (D50w Syringe) 25 ml Q15M  PRN IV DECREASED GLUCOSE;  Start 1/31/19 at 


23:00


Dextrose (D50w Syringe) 50 ml Q15M  PRN IV DECREASED GLUCOSE;  Start 1/31/19 at 


23:00


Glucagon (Glucagen) 1 mg Q15M  PRN IM DECREASED GLUCOSE;  Start 1/31/19 at 23:00


Glucose (Glutose) 15 gm Q15M  PRN BUCCAL DECREASED GLUCOSE;  Start 1/31/19 at 


23:00


Docusate Sodium (Colace) 100 mg BID PO  Last administered on 2/2/19at 08:39; 


Admin Dose 100 MG;  Start 1/31/19 at 22:30


Famotidine (Pepcid) 20 mg Q12 PO  Last administered on 2/2/19at 08:39; Admin 


Dose 20 MG;  Start 1/31/19 at 22:30


Insulin Aspart (Novolog Insulin Pen) (Adult SC Insulin - Moder... WITH MEALS  


BEDTIME SC  Last administered on 2/2/19at 17:40; Admin Dose 2 UNIT;  Start 


1/31/19 at 22:30


Diagnostic Test (Pha) (Accu-Chek) 1 ea 02 XX ;  Start 2/1/19 at 02:00


Insulin Glargine (Lantus) 10 units DAILY@2000 SC  Last administered on 2/1/19at 


20:49; Admin Dose 10 UNITS;  Start 1/31/19 at 23:00


Carbidopa/Levodopa (Sinemet (25/ 100)) 1 tab TID PO  Last administered on 


2/2/19at 12:31; Admin Dose 1 TAB;  Start 1/31/19 at 22:30


Linagliptin (Tradjenta) 5 mg DAILY PO  Last administered on 2/2/19at 07:51; 


Admin Dose 5 MG;  Start 2/1/19 at 09:00


Metformin HCl (Glucophage) 500 mg BID WITH  MEALS PO  Last administered on 


2/2/19at 17:39; Admin Dose 500 MG;  Start 2/1/19 at 07:35


Morphine Sulfate (morphine) 6 mg Q4H  PRN PO SEVERE PAIN LEVEL 7-10 Last 


administered on 2/2/19at 17:40; Admin Dose 6 MG;  Start 1/31/19 at 22:40


Ondansetron HCl (Zofran Inj) 4 mg Q6H  PRN IV NAUSEA AND/OR VOMITING;  Start 


1/31/19 at 23:00


Senna (Senokot) 1 tab HS PO  Last administered on 2/1/19at 20:44; Admin Dose 1 


TAB;  Start 2/1/19 at 21:00


Magnesium Hydroxide (Milk Of Mag) 30 ml BID  PRN PO CONSTIPATION Last 


administered on 2/1/19at 20:48; Admin Dose 30 ML;  Start 2/1/19 at 00:00


Lactulose (Enulose) 20 gm DAILY  PRN PO CONSTIPATION Last administered on 


2/2/19at 08:40; Admin Dose 20 GM;  Start 2/1/19 at 00:00


Acetaminophen (Tylenol Tab) 650 mg Q4H  PRN PO PAIN Last administered on 


2/2/19at 08:40; Admin Dose 650 MG;  Start 2/1/19 at 00:00


Miscellaneous Information (Pending Santyl Order For Wound Care) This patient 


ha... PRN  PRN XX WOUND CARE;  Start 2/1/19 at 00:00


Bisacodyl (Dulcolax Supp) 10 mg DAILY  PRN [place] CONSTIPATION;  Start 2/2/19 at 


10:30


Sodium Biphosphate/ Sodium Phosphate (Fleet Enema) 133 ml DAILY  PRN [place] CONST


IPATION;  Start 2/2/19 at 10:30


Polyethylene Glycol (Miralax) 17 gm DAILY  PRN PO CONSTIPATION Last administered


on 2/2/19at 12:31; Admin Dose 17 GM;  Start 2/2/19 at 10:30











BERYL GARCIA                  Feb 2, 2019 17:57

## 2019-02-03 VITALS — SYSTOLIC BLOOD PRESSURE: 131 MMHG | DIASTOLIC BLOOD PRESSURE: 73 MMHG | HEART RATE: 88 BPM | RESPIRATION RATE: 18 BRPM

## 2019-02-03 VITALS — RESPIRATION RATE: 18 BRPM | HEART RATE: 88 BPM | DIASTOLIC BLOOD PRESSURE: 65 MMHG | SYSTOLIC BLOOD PRESSURE: 125 MMHG

## 2019-02-03 VITALS — DIASTOLIC BLOOD PRESSURE: 75 MMHG | RESPIRATION RATE: 18 BRPM | SYSTOLIC BLOOD PRESSURE: 146 MMHG | HEART RATE: 83 BPM

## 2019-02-03 VITALS — RESPIRATION RATE: 18 BRPM | HEART RATE: 103 BPM | SYSTOLIC BLOOD PRESSURE: 131 MMHG | DIASTOLIC BLOOD PRESSURE: 78 MMHG

## 2019-02-03 RX ADMIN — LINAGLIPTIN SCH MG: 5 TABLET, FILM COATED ORAL at 08:56

## 2019-02-03 RX ADMIN — INSULIN ASPART 1 UNIT: 100 INJECTION, SOLUTION INTRAVENOUS; SUBCUTANEOUS at 20:56

## 2019-02-03 RX ADMIN — CARBIDOPA AND LEVODOPA SCH TAB: 25; 100 TABLET ORAL at 20:52

## 2019-02-03 RX ADMIN — DOCUSATE SODIUM SCH MG: 100 CAPSULE, LIQUID FILLED ORAL at 20:52

## 2019-02-03 RX ADMIN — ATORVASTATIN CALCIUM 1 MG: 40 TABLET, FILM COATED ORAL at 20:52

## 2019-02-03 RX ADMIN — ASPIRIN 1 MG: 81 TABLET, COATED ORAL at 08:57

## 2019-02-03 RX ADMIN — INSULIN GLARGINE 1 UNITS: 100 INJECTION, SOLUTION SUBCUTANEOUS at 20:58

## 2019-02-03 RX ADMIN — CARBIDOPA AND LEVODOPA SCH TAB: 25; 100 TABLET ORAL at 08:56

## 2019-02-03 RX ADMIN — SENNOSIDES SCH TAB: 8.6 TABLET, FILM COATED ORAL at 20:53

## 2019-02-03 RX ADMIN — BISACODYL SCH MG: 5 TABLET, COATED ORAL at 08:56

## 2019-02-03 RX ADMIN — FAMOTIDINE SCH MG: 20 TABLET ORAL at 20:52

## 2019-02-03 RX ADMIN — DOCUSATE SODIUM 1 MG: 100 CAPSULE, LIQUID FILLED ORAL at 08:57

## 2019-02-03 RX ADMIN — MORPHINE SULFATE 1 MG: 10 SOLUTION ORAL at 18:51

## 2019-02-03 RX ADMIN — CARBIDOPA AND LEVODOPA 1 TAB: 25; 100 TABLET ORAL at 08:56

## 2019-02-03 RX ADMIN — SENNOSIDES 1 TAB: 8.6 TABLET, FILM COATED ORAL at 20:53

## 2019-02-03 RX ADMIN — ACETAMINOPHEN 1 MG: 325 TABLET, FILM COATED ORAL at 20:53

## 2019-02-03 RX ADMIN — MORPHINE SULFATE PRN MG: 10 SOLUTION ORAL at 05:36

## 2019-02-03 RX ADMIN — ATORVASTATIN CALCIUM SCH MG: 40 TABLET, FILM COATED ORAL at 20:52

## 2019-02-03 RX ADMIN — CARBIDOPA AND LEVODOPA SCH TAB: 25; 100 TABLET ORAL at 12:56

## 2019-02-03 RX ADMIN — BISACODYL 1 MG: 5 TABLET, COATED ORAL at 08:56

## 2019-02-03 RX ADMIN — CARBIDOPA AND LEVODOPA 1 TAB: 25; 100 TABLET ORAL at 20:52

## 2019-02-03 RX ADMIN — MORPHINE SULFATE PRN MG: 10 SOLUTION ORAL at 18:51

## 2019-02-03 RX ADMIN — ASPIRIN SCH MG: 81 TABLET, COATED ORAL at 08:57

## 2019-02-03 RX ADMIN — Medication 1 MG: at 17:58

## 2019-02-03 RX ADMIN — INSULIN ASPART 1 UNIT: 100 INJECTION, SOLUTION INTRAVENOUS; SUBCUTANEOUS at 08:20

## 2019-02-03 RX ADMIN — LINAGLIPTIN 1 MG: 5 TABLET, FILM COATED ORAL at 08:56

## 2019-02-03 RX ADMIN — FAMOTIDINE 1 MG: 20 TABLET ORAL at 20:52

## 2019-02-03 RX ADMIN — DOCUSATE SODIUM 1 MG: 100 CAPSULE, LIQUID FILLED ORAL at 20:52

## 2019-02-03 RX ADMIN — DOCUSATE SODIUM SCH MG: 100 CAPSULE, LIQUID FILLED ORAL at 08:57

## 2019-02-03 RX ADMIN — ACETAMINOPHEN 1 MG: 325 TABLET, FILM COATED ORAL at 00:08

## 2019-02-03 RX ADMIN — CARBIDOPA AND LEVODOPA 1 TAB: 25; 100 TABLET ORAL at 12:56

## 2019-02-03 RX ADMIN — INSULIN GLARGINE SCH UNITS: 100 INJECTION, SOLUTION SUBCUTANEOUS at 20:58

## 2019-02-03 RX ADMIN — MORPHINE SULFATE 1 MG: 10 SOLUTION ORAL at 05:36

## 2019-02-03 RX ADMIN — FAMOTIDINE SCH MG: 20 TABLET ORAL at 08:57

## 2019-02-03 RX ADMIN — INSULIN ASPART 1 UNIT: 100 INJECTION, SOLUTION INTRAVENOUS; SUBCUTANEOUS at 17:35

## 2019-02-03 RX ADMIN — FAMOTIDINE 1 MG: 20 TABLET ORAL at 08:57

## 2019-02-03 RX ADMIN — INSULIN ASPART 1 UNIT: 100 INJECTION, SOLUTION INTRAVENOUS; SUBCUTANEOUS at 12:00

## 2019-02-04 VITALS — RESPIRATION RATE: 18 BRPM | HEART RATE: 69 BPM | SYSTOLIC BLOOD PRESSURE: 114 MMHG | DIASTOLIC BLOOD PRESSURE: 76 MMHG

## 2019-02-04 VITALS — RESPIRATION RATE: 18 BRPM | DIASTOLIC BLOOD PRESSURE: 70 MMHG | HEART RATE: 86 BPM | SYSTOLIC BLOOD PRESSURE: 132 MMHG

## 2019-02-04 VITALS — HEART RATE: 88 BPM | RESPIRATION RATE: 18 BRPM | SYSTOLIC BLOOD PRESSURE: 117 MMHG | DIASTOLIC BLOOD PRESSURE: 64 MMHG

## 2019-02-04 VITALS — HEART RATE: 87 BPM | DIASTOLIC BLOOD PRESSURE: 73 MMHG | RESPIRATION RATE: 18 BRPM | SYSTOLIC BLOOD PRESSURE: 146 MMHG

## 2019-02-04 RX ADMIN — CARBIDOPA AND LEVODOPA SCH TAB: 25; 100 TABLET ORAL at 11:59

## 2019-02-04 RX ADMIN — INSULIN ASPART 1 UNIT: 100 INJECTION, SOLUTION INTRAVENOUS; SUBCUTANEOUS at 21:00

## 2019-02-04 RX ADMIN — CARBIDOPA AND LEVODOPA 1 TAB: 25; 100 TABLET ORAL at 08:32

## 2019-02-04 RX ADMIN — DOCUSATE SODIUM 1 MG: 100 CAPSULE, LIQUID FILLED ORAL at 08:32

## 2019-02-04 RX ADMIN — LINAGLIPTIN SCH MG: 5 TABLET, FILM COATED ORAL at 07:45

## 2019-02-04 RX ADMIN — FAMOTIDINE SCH MG: 20 TABLET ORAL at 08:32

## 2019-02-04 RX ADMIN — FAMOTIDINE 1 MG: 20 TABLET ORAL at 08:32

## 2019-02-04 RX ADMIN — SENNOSIDES 1 TAB: 8.6 TABLET, FILM COATED ORAL at 21:00

## 2019-02-04 RX ADMIN — ATORVASTATIN CALCIUM 1 MG: 40 TABLET, FILM COATED ORAL at 21:06

## 2019-02-04 RX ADMIN — BISACODYL 1 MG: 5 TABLET, COATED ORAL at 08:32

## 2019-02-04 RX ADMIN — BISACODYL SCH MG: 5 TABLET, COATED ORAL at 08:32

## 2019-02-04 RX ADMIN — ACETAMINOPHEN 1 MG: 325 TABLET, FILM COATED ORAL at 21:06

## 2019-02-04 RX ADMIN — CARBIDOPA AND LEVODOPA SCH TAB: 25; 100 TABLET ORAL at 21:06

## 2019-02-04 RX ADMIN — CARBIDOPA AND LEVODOPA 1 TAB: 25; 100 TABLET ORAL at 21:06

## 2019-02-04 RX ADMIN — INSULIN ASPART 1 UNIT: 100 INJECTION, SOLUTION INTRAVENOUS; SUBCUTANEOUS at 07:35

## 2019-02-04 RX ADMIN — LINAGLIPTIN 1 MG: 5 TABLET, FILM COATED ORAL at 07:45

## 2019-02-04 RX ADMIN — ASPIRIN SCH MG: 81 TABLET, COATED ORAL at 08:32

## 2019-02-04 RX ADMIN — INSULIN ASPART 1 UNIT: 100 INJECTION, SOLUTION INTRAVENOUS; SUBCUTANEOUS at 11:59

## 2019-02-04 RX ADMIN — DOCUSATE SODIUM SCH MG: 100 CAPSULE, LIQUID FILLED ORAL at 21:00

## 2019-02-04 RX ADMIN — ASPIRIN 1 MG: 81 TABLET, COATED ORAL at 08:32

## 2019-02-04 RX ADMIN — SENNOSIDES SCH TAB: 8.6 TABLET, FILM COATED ORAL at 21:00

## 2019-02-04 RX ADMIN — INSULIN GLARGINE SCH UNITS: 100 INJECTION, SOLUTION SUBCUTANEOUS at 21:11

## 2019-02-04 RX ADMIN — ACETAMINOPHEN 1 MG: 325 TABLET, FILM COATED ORAL at 08:32

## 2019-02-04 RX ADMIN — CARBIDOPA AND LEVODOPA SCH TAB: 25; 100 TABLET ORAL at 08:32

## 2019-02-04 RX ADMIN — FAMOTIDINE SCH MG: 20 TABLET ORAL at 21:06

## 2019-02-04 RX ADMIN — INSULIN ASPART 1 UNIT: 100 INJECTION, SOLUTION INTRAVENOUS; SUBCUTANEOUS at 17:35

## 2019-02-04 RX ADMIN — CARBIDOPA AND LEVODOPA 1 TAB: 25; 100 TABLET ORAL at 11:59

## 2019-02-04 RX ADMIN — INSULIN GLARGINE 1 UNITS: 100 INJECTION, SOLUTION SUBCUTANEOUS at 21:11

## 2019-02-04 RX ADMIN — ATORVASTATIN CALCIUM SCH MG: 40 TABLET, FILM COATED ORAL at 21:06

## 2019-02-04 RX ADMIN — FAMOTIDINE 1 MG: 20 TABLET ORAL at 21:06

## 2019-02-04 RX ADMIN — MORPHINE SULFATE PRN MG: 10 SOLUTION ORAL at 06:42

## 2019-02-04 RX ADMIN — MORPHINE SULFATE 1 MG: 10 SOLUTION ORAL at 06:42

## 2019-02-04 RX ADMIN — DOCUSATE SODIUM SCH MG: 100 CAPSULE, LIQUID FILLED ORAL at 08:32

## 2019-02-04 RX ADMIN — DOCUSATE SODIUM 1 MG: 100 CAPSULE, LIQUID FILLED ORAL at 21:00

## 2019-02-04 NOTE — PN
Date/Time of Note


Date/Time of Note


DATE: 2/4/19 


TIME: 16:33





Assessment/Plan


VTE Prophylaxis


Risk score (from Ns)>0 risk:  4


[place] [facility] [place]


Pharmacological prophylaxis:  NA/contraindicated, other


Pharm contraindication:  other





Lines/Catheters


IV Catheter Type (from [facility]):  Peripheral IV


Urinary Cath still in place:  No





Assessment/Plan


Hospital Course


Patient is awake alert,  follow immediate commands, works with physical therapy.


Assessment/Plan


-Acute stroke with right-sided weakness.  Continue aspirin and Lipitor.  


Continue PT and OT.  S/p evaluation by  Dr. Braden  in neurology 


consultation.  


-Encephalopathy secondary to acute stroke


-Diabetes mellitus type 2 with hemoglobin A1c 8.1.  Continue metformin and Tradj


enta, NovoLog per mild algorithm sliding scale.


-Parkinson's disease, continue Sinemet.


-S/p UTI, completed treatment with Rocephin





Further recommendations based on clinical course.  Plan of care discussed with 


Dr. Win.


Result Diagram:  


2/1/19 0543                                                                     


          2/1/19 0543





Results 24hrs





Laboratory Tests


    Test
            2/3/19
17:28  2/3/19
20:54  2/4/19
07:41  2/4/19
11:56


    Bedside Glucose         131           127            84           155








Exam/Review of Systems


Exam


Vitals





Vital Signs


  Date      Temp  Pulse  Resp  B/P (MAP)   Pulse Ox  O2          O2 Flow    FiO2


Time                                                 Delivery    Rate


    2/4/19  98.2     88    18      117/64        92  Room Air


     14:00                           (81)








Intake and Output





2/3/19


2/3/19


2/4/19





1414:59


22:59


06:59





IntakeIntake Total


1200 ml


240 ml





OutputOutput Total


800 ml





BalanceBalance


400 ml


240 ml











Exam


Constitutional:  alert, oriented


Respiratory:  clear to auscultation


Cardiovascular:  regular rate and rhythm


Gastrointestinal:  soft, non-tender


Musculoskeletal:  nl extremities to inspection


Extremities:  normal pulses


Neurological:  confused, other (R sided weakness)


Skin:  other (Right knee abrasion, healing, mid chest scar)





Results


Results 24hrs





Laboratory Tests


    Test
            2/3/19
17:28  2/3/19
20:54  2/4/19
07:41  2/4/19
11:56


    Bedside Glucose         131           127            84           155








Medications


Medication





Current Medications


Aspirin (Halfprin) 81 mg DAILY PO  Last administered on 2/4/19at 08:32; Admin 


Dose 81 MG;  Start 2/1/19 at 09:00


Atorvastatin Calcium (Lipitor) 40 mg DAILY@21 PO  Last administered on 2/3/19at 


20:52; Admin Dose 40 MG;  Start 1/31/19 at 22:30


Bisacodyl (Dulcolax) 10 mg DAILY PO  Last administered on 2/3/19at 08:56; Admin 


Dose 10 MG;  Start 2/1/19 at 09:00


Clonidine (Catapres) 0.1 mg Q6H  PRN PO ELEVATED BLOOD PRESSURE;  Start 1/31/19 


at 23:00


Miscellaneous Information 1 ea NOTE XX ;  Start 1/31/19 at 23:00


Glucose (Glutose) 15 gm Q15M  PRN PO DECREASED GLUCOSE;  Start 1/31/19 at 23:00


Glucose (Glutose) 22.5 gm Q15M  PRN PO DECREASED GLUCOSE;  Start 1/31/19 at 


23:00


Dextrose (D50w Syringe) 25 ml Q15M  PRN IV DECREASED GLUCOSE;  Start 1/31/19 at 


23:00


Dextrose (D50w Syringe) 50 ml Q15M  PRN IV DECREASED GLUCOSE;  Start 1/31/19 at 


23:00


Glucagon (Glucagen) 1 mg Q15M  PRN IM DECREASED GLUCOSE;  Start 1/31/19 at 23:00


Glucose (Glutose) 15 gm Q15M  PRN BUCCAL DECREASED GLUCOSE;  Start 1/31/19 at 


23:00


Famotidine (Pepcid) 20 mg Q12 PO  Last administered on 2/4/19at 08:32; Admin 


Dose 20 MG;  Start 1/31/19 at 22:30


Insulin Aspart (Novolog Insulin Pen) (Adult SC Insulin - Moder... WITH MEALS  


BEDTIME SC  Last administered on 2/4/19at 11:59; Admin Dose 2 UNIT;  Start 


1/31/19 at 22:30


Diagnostic Test (Pha) (Accu-Chek) 1 ea 02 XX ;  Start 2/1/19 at 02:00


Insulin Glargine (Lantus) 10 units DAILY@2000 SC  Last administered on 2/3/19at 


20:58; Admin Dose 10 UNITS;  Start 1/31/19 at 23:00


Carbidopa/Levodopa (Sinemet (25/ 100)) 1 tab TID PO  Last administered on 


2/4/19at 11:59; Admin Dose 1 TAB;  Start 1/31/19 at 22:30


Linagliptin (Tradjenta) 5 mg DAILY PO  Last administered on 2/4/19at 07:45; 


Admin Dose 5 MG;  Start 2/1/19 at 09:00


Metformin HCl (Glucophage) 500 mg BID WITH  MEALS PO  Last administered on 


2/4/19at 07:45; Admin Dose 500 MG;  Start 2/1/19 at 07:35


Ondansetron HCl (Zofran Inj) 4 mg Q6H  PRN IV NAUSEA AND/OR VOMITING;  Start 


1/31/19 at 23:00


Senna (Senokot) 1 tab HS PO  Last administered on 2/2/19at 20:00; Admin Dose 1 


TAB;  Start 2/1/19 at 21:00


Magnesium Hydroxide (Milk Of Mag) 30 ml BID  PRN PO CONSTIPATION Last 


administered on 2/1/19at 20:48; Admin Dose 30 ML;  Start 2/1/19 at 00:00


Lactulose (Enulose) 20 gm DAILY  PRN PO CONSTIPATION Last administered on 


2/2/19at 08:40; Admin Dose 20 GM;  Start 2/1/19 at 00:00


Acetaminophen (Tylenol Tab) 650 mg Q4H  PRN PO PAIN Last administered on 2/4/1


9at 08:32; Admin Dose 650 MG;  Start 2/1/19 at 00:00


Miscellaneous Information (Pending [facility] Order For Wound Care) This patient 


ha... PRN  PRN XX WOUND CARE;  Start 2/1/19 at 00:00


Bisacodyl (Dulcolax Supp) 10 mg DAILY  PRN [place] CONSTIPATION Last administered on 


2/3/19at 17:58; Admin Dose 10 MG;  Start 2/2/19 at 10:30


Sodium Biphosphate/ Sodium Phosphate (Fleet Enema) 133 ml DAILY  PRN [place] CONSTIPA


TION;  Start 2/2/19 at 10:30


Polyethylene Glycol (Miralax) 17 gm DAILY  PRN PO CONSTIPATION Last administered


on 2/2/19at 12:31; Admin Dose 17 GM;  Start 2/2/19 at 10:30


Docusate Sodium (Colace) 100 mg BID PO  Last administered on 2/3/19at 20:52; 


Admin Dose 100 MG;  Start 2/3/19 at 09:00


Morphine Sulfate (morphine) 4 mg Q4H  PRN PO SEVERE PAIN LEVEL 7-10;  Start 


2/4/19 at 12:00











LUCY CHEN              Feb 4, 2019 16:37

## 2019-02-04 NOTE — PN
Date/Time of Note


Date/Time of Note


DATE: 2/4/19 


TIME: 04:14





Assessment/Plan


VTE Prophylaxis


Risk score (from Ns)>0 risk:  4


SCD applied (from [facility]):  Yes





Lines/Catheters


IV Catheter Type (from [facility]rs[facility]):  Peripheral IV


Urinary Cath still in place:  No





Assessment/Plan


Result Diagram:  


2/1/19 0543                                                                     


          2/1/19 0543





Results 24hrs





Laboratory Tests


    Test
            2/3/19
08:10  2/3/19
12:06  2/3/19
17:28  2/3/19
20:54


    Bedside Glucose          90           133           131           127








Exam/Review of Systems


Exam


Vitals





Vital Signs


  Date      Temp  Pulse  Resp  B/P (MAP)   Pulse Ox  O2          O2 Flow    FiO2


Time                                                 Delivery    Rate


    2/4/19  97.8     86    18      132/70        96  Room Air


     02:19                           (90)








Intake and Output





2/3/19


2/3/19


2/4/19





1515:00


23:00


07:00





IntakeIntake Total


1200 ml





OutputOutput Total


800 ml





BalanceBalance


400 ml














Results


Results 24hrs





Laboratory Tests


    Test
            2/3/19
08:10  2/3/19
12:06  2/3/19
17:28  2/3/19
20:54


    Bedside Glucose          90           133           131           127








Medications


Medication





Current Medications


Aspirin (Halfprin) 81 mg DAILY PO  Last administered on 2/3/19at 08:57; Admin 


Dose 81 MG;  Start 2/1/19 at 09:00


Atorvastatin Calcium (Lipitor) 40 mg DAILY@21 PO  Last administered on 2/3/19at 


20:52; Admin Dose 40 MG;  Start 1/31/19 at 22:30


Bisacodyl (Dulcolax) 10 mg DAILY PO  Last administered on 2/3/19at 08:56; Admin 


Dose 10 MG;  Start 2/1/19 at 09:00


Clonidine (Catapres) 0.1 mg Q6H  PRN PO ELEVATED BLOOD PRESSURE;  Start 1/31/19 


at 23:00


Miscellaneous Information 1 ea NOTE XX ;  Start 1/31/19 at 23:00


Glucose (Glutose) 15 gm Q15M  PRN PO DECREASED GLUCOSE;  Start 1/31/19 at 23:00


Glucose (Glutose) 22.5 gm Q15M  PRN PO DECREASED GLUCOSE;  Start 1/31/19 at 


23:00


Dextrose (D50w Syringe) 25 ml Q15M  PRN IV DECREASED GLUCOSE;  Start 1/31/19 at 


23:00


Dextrose (D50w Syringe) 50 ml Q15M  PRN IV DECREASED GLUCOSE;  Start 1/31/19 at 


23:00


Glucagon (Glucagen) 1 mg Q15M  PRN IM DECREASED GLUCOSE;  Start 1/31/19 at 23:00


Glucose (Glutose) 15 gm Q15M  PRN BUCCAL DECREASED GLUCOSE;  Start 1/31/19 at 


23:00


Famotidine (Pepcid) 20 mg Q12 PO  Last administered on 2/3/19at 20:52; Admin 


Dose 20 MG;  Start 1/31/19 at 22:30


Insulin Aspart (Novolog Insulin Pen) (Adult SC Insulin - Moder... WITH MEALS  


BEDTIME SC  Last administered on 2/2/19at 17:40; Admin Dose 2 UNIT;  Start 


1/31/19 at 22:30


Diagnostic Test (Pha) (Accu-Chek) 1 ea 02 XX ;  Start 2/1/19 at 02:00


Insulin Glargine (Lantus) 10 units DAILY@2000 SC  Last administered on 2/3/19at 


20:58; Admin Dose 10 UNITS;  Start 1/31/19 at 23:00


Carbidopa/Levodopa (Sinemet (25/ 100)) 1 tab TID PO  Last administered on 


2/3/19at 20:52; Admin Dose 1 TAB;  Start 1/31/19 at 22:30


Linagliptin (Tradjenta) 5 mg DAILY PO  Last administered on 2/3/19at 08:56; 


Admin Dose 5 MG;  Start 2/1/19 at 09:00


Metformin HCl (Glucophage) 500 mg BID WITH  MEALS PO  Last administered on 


2/3/19at 17:59; Admin Dose 500 MG;  Start 2/1/19 at 07:35


Morphine Sulfate (morphine) 6 mg Q4H  PRN PO SEVERE PAIN LEVEL 7-10 Last 


administered on 2/3/19at 18:51; Admin Dose 6 MG;  Start 1/31/19 at 22:40


Ondansetron HCl (Zofran Inj) 4 mg Q6H  PRN IV NAUSEA AND/OR VOMITING;  Start 


1/31/19 at 23:00


Senna (Senokot) 1 tab HS PO  Last administered on 2/2/19at 20:00; Admin Dose 1 


TAB;  Start 2/1/19 at 21:00


Magnesium Hydroxide (Milk Of Mag) 30 ml BID  PRN PO CONSTIPATION Last 


administered on 2/1/19at 20:48; Admin Dose 30 ML;  Start 2/1/19 at 00:00


Lactulose (Enulose) 20 gm DAILY  PRN PO CONSTIPATION Last administered on 


2/2/19at 08:40; Admin Dose 20 GM;  Start 2/1/19 at 00:00


Acetaminophen (Tylenol Tab) 650 mg Q4H  PRN PO PAIN Last administered on 


2/3/19at 20:53; Admin Dose 650 MG;  Start 2/1/19 at 00:00


Miscellaneous Information (Pending [facility] Order For Wound Care) This patient 


ha... PRN  PRN XX WOUND CARE;  Start 2/1/19 at 00:00


Bisacodyl (Dulcolax Supp) 10 mg DAILY  PRN [place] CONSTIPATION Last administered on 


2/3/19at 17:58; Admin Dose 10 MG;  Start 2/2/19 at 10:30


Sodium Biphosphate/ Sodium Phosphate (Fleet Enema) 133 ml DAILY  PRN [place] 


CONSTIPATION;  Start 2/2/19 at 10:30


Polyethylene Glycol (Miralax) 17 gm DAILY  PRN PO CONSTIPATION Last administered


on 2/2/19at 12:31; Admin Dose 17 GM;  Start 2/2/19 at 10:30


Docusate Sodium (Colace) 100 mg BID PO  Last administered on 2/3/19at 20:52; 


Admin Dose 100 MG;  Start 2/3/19 at 09:00











BERYL GARCIA                  Feb 4, 2019 04:14

## 2019-02-04 NOTE — PN
Date/Time of Note


Date/Time of Note


DATE: 2/4/19 


TIME: 13:14





Objective





Vital Signs


  Date      Temp  Pulse  Resp  B/P (MAP)   Pulse Ox  O2          O2 Flow    FiO2


Time                                                 Delivery    Rate


    2/4/19  98.1     87    18      146/73        98  Room Air


     07:00                           (97)








Intake and Output





2/3/19


2/3/19


2/4/19





1414:59


22:59


06:59





IntakeIntake Total


1200 ml


240 ml





OutputOutput Total


800 ml





BalanceBalance


400 ml


240 ml











Exam





INTERDISCIPLINARY TEAM CONFERENCE





Physical Exam:


Pulm-cta


Abd-soft








BOWEL- Cont


BLADDER-Cont 


SKIN- intact











OT-     DRESSING- max/dep


   BATHING-max/dep


   TOILETING-dep





PT-      BED MOBILITY-max


   TRANSFERS-max x 2


   WHEELCHAIR-max


   


SPEECH- 


COGNITION- mod


Dysphagia- tolerating current diet   








A/P-


Interdisciplinary team conference held today. Please see interdisciplinary 


sheet.  Working toward d.c. on 2/20 with post discharge follow up of physical 


therapy, occupational therapy.





Results/Medications


Result Diagram:  


2/1/19 0543                                                                     


          2/1/19 0543





Results 24 hrs





Laboratory Tests


    Test
            2/3/19
17:28  2/3/19
20:54  2/4/19
07:41  2/4/19
11:56


    Bedside Glucose         131           127            84           155





Medications





Current Medications


Aspirin (Halfprin) 81 mg DAILY PO  Last administered on 2/4/19at 08:32; Admin 


Dose 81 MG;  Start 2/1/19 at 09:00


Atorvastatin Calcium (Lipitor) 40 mg DAILY@21 PO  Last administered on 2/3/19at 


20:52; Admin Dose 40 MG;  Start 1/31/19 at 22:30


Bisacodyl (Dulcolax) 10 mg DAILY PO  Last administered on 2/3/19at 08:56; Admin 


Dose 10 MG;  Start 2/1/19 at 09:00


Clonidine (Catapres) 0.1 mg Q6H  PRN PO ELEVATED BLOOD PRESSURE;  Start 1/31/19 


at 23:00


Miscellaneous Information 1 ea NOTE XX ;  Start 1/31/19 at 23:00


Glucose (Glutose) 15 gm Q15M  PRN PO DECREASED GLUCOSE;  Start 1/31/19 at 23:00


Glucose (Glutose) 22.5 gm Q15M  PRN PO DECREASED GLUCOSE;  Start 1/31/19 at 


23:00


Dextrose (D50w Syringe) 25 ml Q15M  PRN IV DECREASED GLUCOSE;  Start 1/31/19 at 


23:00


Dextrose (D50w Syringe) 50 ml Q15M  PRN IV DECREASED GLUCOSE;  Start 1/31/19 at 


23:00


Glucagon (Glucagen) 1 mg Q15M  PRN IM DECREASED GLUCOSE;  Start 1/31/19 at 23:00


Glucose (Glutose) 15 gm Q15M  PRN BUCCAL DECREASED GLUCOSE;  Start 1/31/19 at 


23:00


Famotidine (Pepcid) 20 mg Q12 PO  Last administered on 2/4/19at 08:32; Admin Do


se 20 MG;  Start 1/31/19 at 22:30


Insulin Aspart (Novolog Insulin Pen) (Adult SC Insulin - Moder... WITH MEALS  


BEDTIME SC  Last administered on 2/4/19at 11:59; Admin Dose 2 UNIT;  Start 


1/31/19 at 22:30


Diagnostic Test (Pha) (Accu-Chek) 1 ea 02 XX ;  Start 2/1/19 at 02:00


Insulin Glargine (Lantus) 10 units DAILY@2000 SC  Last administered on 2/3/19at 


20:58; Admin Dose 10 UNITS;  Start 1/31/19 at 23:00


Carbidopa/Levodopa (Sinemet (25/ 100)) 1 tab TID PO  Last administered on 


2/4/19at 11:59; Admin Dose 1 TAB;  Start 1/31/19 at 22:30


Linagliptin (Tradjenta) 5 mg DAILY PO  Last administered on 2/4/19at 07:45; 


Admin Dose 5 MG;  Start 2/1/19 at 09:00


Metformin HCl (Glucophage) 500 mg BID WITH  MEALS PO  Last administered on 


2/4/19at 07:45; Admin Dose 500 MG;  Start 2/1/19 at 07:35


Ondansetron HCl (Zofran Inj) 4 mg Q6H  PRN IV NAUSEA AND/OR VOMITING;  Start 


1/31/19 at 23:00


Senna (Senokot) 1 tab HS PO  Last administered on 2/2/19at 20:00; Admin Dose 1 


TAB;  Start 2/1/19 at 21:00


Magnesium Hydroxide (Milk Of Mag) 30 ml BID  PRN PO CONSTIPATION Last 


administered on 2/1/19at 20:48; Admin Dose 30 ML;  Start 2/1/19 at 00:00


Lactulose (Enulose) 20 gm DAILY  PRN PO CONSTIPATION Last administered on 


2/2/19at 08:40; Admin Dose 20 GM;  Start 2/1/19 at 00:00


Acetaminophen (Tylenol Tab) 650 mg Q4H  PRN PO PAIN Last administered on 


2/4/19at 08:32; Admin Dose 650 MG;  Start 2/1/19 at 00:00


Miscellaneous Information (Pending [facility] Order For Wound Care) This patient 


ha... PRN  PRN XX WOUND CARE;  Start 2/1/19 at 00:00


Bisacodyl (Dulcolax Supp) 10 mg DAILY  PRN [place] CONSTIPATION Last administered on 


2/3/19at 17:58; Admin Dose 10 MG;  Start 2/2/19 at 10:30


Sodium Biphosphate/ Sodium Phosphate (Fleet Enema) 133 ml DAILY  PRN [place] 


CONSTIPATION;  Start 2/2/19 at 10:30


Polyethylene Glycol (Miralax) 17 gm DAILY  PRN PO CONSTIPATION Last administered


on 2/2/19at 12:31; Admin Dose 17 GM;  Start 2/2/19 at 10:30


Docusate Sodium (Colace) 100 mg BID PO  Last administered on 2/3/19at 20:52; 


Admin Dose 100 MG;  Start 2/3/19 at 09:00


Morphine Sulfate (morphine) 4 mg Q4H  PRN PO SEVERE PAIN LEVEL 7-10;  Start 


2/4/19 at 12:00











MARS POMPA MD              Feb 4, 2019 13:14

## 2019-02-05 VITALS — DIASTOLIC BLOOD PRESSURE: 74 MMHG | HEART RATE: 82 BPM | RESPIRATION RATE: 20 BRPM | SYSTOLIC BLOOD PRESSURE: 155 MMHG

## 2019-02-05 VITALS — SYSTOLIC BLOOD PRESSURE: 119 MMHG | DIASTOLIC BLOOD PRESSURE: 68 MMHG | HEART RATE: 86 BPM | RESPIRATION RATE: 20 BRPM

## 2019-02-05 VITALS — RESPIRATION RATE: 18 BRPM | SYSTOLIC BLOOD PRESSURE: 139 MMHG | HEART RATE: 85 BPM | DIASTOLIC BLOOD PRESSURE: 82 MMHG

## 2019-02-05 VITALS — HEART RATE: 93 BPM | SYSTOLIC BLOOD PRESSURE: 131 MMHG | DIASTOLIC BLOOD PRESSURE: 68 MMHG | RESPIRATION RATE: 18 BRPM

## 2019-02-05 RX ADMIN — SENNOSIDES 1 TAB: 8.6 TABLET, FILM COATED ORAL at 20:44

## 2019-02-05 RX ADMIN — BISACODYL 1 MG: 5 TABLET, COATED ORAL at 09:00

## 2019-02-05 RX ADMIN — CARBIDOPA AND LEVODOPA 1 TAB: 25; 100 TABLET ORAL at 11:59

## 2019-02-05 RX ADMIN — ASPIRIN 1 MG: 81 TABLET, COATED ORAL at 09:46

## 2019-02-05 RX ADMIN — DOCUSATE SODIUM SCH MG: 100 CAPSULE, LIQUID FILLED ORAL at 09:00

## 2019-02-05 RX ADMIN — CARBIDOPA AND LEVODOPA 1 TAB: 25; 100 TABLET ORAL at 09:46

## 2019-02-05 RX ADMIN — INSULIN GLARGINE 1 UNITS: 100 INJECTION, SOLUTION SUBCUTANEOUS at 21:39

## 2019-02-05 RX ADMIN — ACETAMINOPHEN 1 MG: 325 TABLET, FILM COATED ORAL at 09:46

## 2019-02-05 RX ADMIN — DOCUSATE SODIUM SCH MG: 100 CAPSULE, LIQUID FILLED ORAL at 20:30

## 2019-02-05 RX ADMIN — SENNOSIDES 1 TAB: 8.6 TABLET, FILM COATED ORAL at 20:30

## 2019-02-05 RX ADMIN — INSULIN GLARGINE SCH UNITS: 100 INJECTION, SOLUTION SUBCUTANEOUS at 21:39

## 2019-02-05 RX ADMIN — LINAGLIPTIN 1 MG: 5 TABLET, FILM COATED ORAL at 08:08

## 2019-02-05 RX ADMIN — FAMOTIDINE SCH MG: 20 TABLET ORAL at 20:30

## 2019-02-05 RX ADMIN — INSULIN ASPART 1 UNIT: 100 INJECTION, SOLUTION INTRAVENOUS; SUBCUTANEOUS at 11:58

## 2019-02-05 RX ADMIN — LINAGLIPTIN SCH MG: 5 TABLET, FILM COATED ORAL at 08:08

## 2019-02-05 RX ADMIN — INSULIN ASPART 1 UNIT: 100 INJECTION, SOLUTION INTRAVENOUS; SUBCUTANEOUS at 17:35

## 2019-02-05 RX ADMIN — DOCUSATE SODIUM 1 MG: 100 CAPSULE, LIQUID FILLED ORAL at 09:00

## 2019-02-05 RX ADMIN — INSULIN ASPART 1 UNIT: 100 INJECTION, SOLUTION INTRAVENOUS; SUBCUTANEOUS at 07:35

## 2019-02-05 RX ADMIN — CARBIDOPA AND LEVODOPA SCH TAB: 25; 100 TABLET ORAL at 09:46

## 2019-02-05 RX ADMIN — ATORVASTATIN CALCIUM 1 MG: 40 TABLET, FILM COATED ORAL at 20:30

## 2019-02-05 RX ADMIN — DOCUSATE SODIUM 1 MG: 100 CAPSULE, LIQUID FILLED ORAL at 20:30

## 2019-02-05 RX ADMIN — CARBIDOPA AND LEVODOPA SCH TAB: 25; 100 TABLET ORAL at 11:59

## 2019-02-05 RX ADMIN — BISACODYL SCH MG: 5 TABLET, COATED ORAL at 09:00

## 2019-02-05 RX ADMIN — INSULIN ASPART 1 UNIT: 100 INJECTION, SOLUTION INTRAVENOUS; SUBCUTANEOUS at 20:39

## 2019-02-05 RX ADMIN — CARBIDOPA AND LEVODOPA 1 TAB: 25; 100 TABLET ORAL at 20:30

## 2019-02-05 RX ADMIN — DOCUSATE SODIUM 1 MG: 100 CAPSULE, LIQUID FILLED ORAL at 20:44

## 2019-02-05 RX ADMIN — SENNOSIDES SCH TAB: 8.6 TABLET, FILM COATED ORAL at 20:44

## 2019-02-05 RX ADMIN — FAMOTIDINE SCH MG: 20 TABLET ORAL at 09:45

## 2019-02-05 RX ADMIN — ASPIRIN SCH MG: 81 TABLET, COATED ORAL at 09:46

## 2019-02-05 RX ADMIN — SENNOSIDES SCH TAB: 8.6 TABLET, FILM COATED ORAL at 20:30

## 2019-02-05 RX ADMIN — CELECOXIB PRN MG: 100 CAPSULE ORAL at 20:30

## 2019-02-05 RX ADMIN — FAMOTIDINE 1 MG: 20 TABLET ORAL at 09:45

## 2019-02-05 RX ADMIN — ATORVASTATIN CALCIUM SCH MG: 40 TABLET, FILM COATED ORAL at 20:30

## 2019-02-05 RX ADMIN — CELECOXIB 1 MG: 100 CAPSULE ORAL at 20:30

## 2019-02-05 RX ADMIN — DOCUSATE SODIUM SCH MG: 100 CAPSULE, LIQUID FILLED ORAL at 20:44

## 2019-02-05 RX ADMIN — CARBIDOPA AND LEVODOPA SCH TAB: 25; 100 TABLET ORAL at 20:30

## 2019-02-05 RX ADMIN — FAMOTIDINE 1 MG: 20 TABLET ORAL at 20:30

## 2019-02-05 NOTE — PN
Date/Time of Note


Date/Time of Note


DATE: 2/5/19 


TIME: 16:21





Assessment/Plan


VTE Prophylaxis


Risk score (from Ns)>0 risk:  4


[place] [facility] [place]


Pharmacological prophylaxis:  NA/contraindicated


Pharm contraindication:  surgical contra, other





Lines/Catheters


IV Catheter Type (from [facility]):  Peripheral IV


Urinary Cath still in place:  No





Assessment/Plan


Hospital Course


No acute events overnight, patient remains hemodynamically stable, patient 


follows commands continue physical and occupational therapy.


Assessment/Plan


-Acute stroke with right-sided weakness.  Continue aspirin and Lipitor.  


Continue PT and OT.  S/p evaluation by  Dr. Braden  in neurology 


consultation.  


-Encephalopathy secondary to acute stroke


-Diabetes mellitus type 2 with hemoglobin A1c 8.1.  Continue metformin and 


Tradjenta, NovoLog per mild algorithm sliding scale.


-Parkinson's disease, continue Sinemet.


-S/p UTI, completed treatment with Rocephin





Further recommendations based on clinical course.  Plan of care discussed with 


Dr. Win.


Result Diagram:  


2/1/19 0543                                                                     


          2/1/19 0543





Results 24hrs





Laboratory Tests


    Test
            2/4/19
17:42  2/4/19
21:08  2/5/19
08:05  2/5/19
11:53


    Bedside Glucose         128           157            82           154








Exam/Review of Systems


Exam


Vitals





Vital Signs


  Date      Temp  Pulse  Resp  B/P (MAP)   Pulse Ox  O2          O2 Flow    FiO2


Time                                                 Delivery    Rate


    2/5/19  97.8     86    20      119/68        96  Room Air


     14:00                           (85)








Intake and Output





2/4/19 2/4/19 2/5/19





1414:59


22:59


06:59





IntakeIntake Total


150 ml


1200 ml





OutputOutput Total


1000 ml





BalanceBalance


150 ml


200 ml











Exam


Constitutional:  alert, oriented


Respiratory:  clear to auscultation


Cardiovascular:  nl pulse


Gastrointestinal:  soft, non-tender


Musculoskeletal:  nl extremities to inspection


Extremities:  normal pulses


Neurological:  confused, other (R sided weakness)


Skin:  other (Right knee abrasion, healing, mid chest scar)





Results


Results 24hrs





Laboratory Tests


    Test
            2/4/19
17:42  2/4/19
21:08  2/5/19
08:05  2/5/19
11:53


    Bedside Glucose         128           157            82           154








Medications


Medication





Current Medications


Aspirin (Halfprin) 81 mg DAILY PO  Last administered on 2/5/19at 09:46; Admin 


Dose 81 MG;  Start 2/1/19 at 09:00


Atorvastatin Calcium (Lipitor) 40 mg DAILY@21 PO  Last administered on 2/4/19at 


21:06; Admin Dose 40 MG;  Start 1/31/19 at 22:30


Bisacodyl (Dulcolax) 10 mg DAILY PO  Last administered on 2/3/19at 08:56; Admin 


Dose 10 MG;  Start 2/1/19 at 09:00


Clonidine (Catapres) 0.1 mg Q6H  PRN PO ELEVATED BLOOD PRESSURE;  Start 1/31/19 


at 23:00


Miscellaneous Information 1 ea NOTE XX ;  Start 1/31/19 at 23:00


Glucose (Glutose) 15 gm Q15M  PRN PO DECREASED GLUCOSE;  Start 1/31/19 at 23:00


Glucose (Glutose) 22.5 gm Q15M  PRN PO DECREASED GLUCOSE;  Start 1/31/19 at 


23:00


Dextrose (D50w Syringe) 25 ml Q15M  PRN IV DECREASED GLUCOSE;  Start 1/31/19 at 


23:00


Dextrose (D50w Syringe) 50 ml Q15M  PRN IV DECREASED GLUCOSE;  Start 1/31/19 at 


23:00


Glucagon (Glucagen) 1 mg Q15M  PRN IM DECREASED GLUCOSE;  Start 1/31/19 at 23:00


Glucose (Glutose) 15 gm Q15M  PRN BUCCAL DECREASED GLUCOSE;  Start 1/31/19 at 


23:00


Famotidine (Pepcid) 20 mg Q12 PO  Last administered on 2/5/19at 09:45; Admin 


Dose 20 MG;  Start 1/31/19 at 22:30


Insulin Aspart (Novolog Insulin Pen) (Adult SC Insulin - Moder... WITH MEALS  


BEDTIME SC  Last administered on 2/5/19at 11:58; Admin Dose 2 UNIT;  Start 1/ 31/19 at 22:30


Diagnostic Test (Pha) (Accu-Chek) 1 ea 02 XX ;  Start 2/1/19 at 02:00


Insulin Glargine (Lantus) 10 units DAILY@2000 SC  Last administered on 2/4/19at 


21:11; Admin Dose 10 UNITS;  Start 1/31/19 at 23:00


Carbidopa/Levodopa (Sinemet (25/ 100)) 1 tab TID PO  Last administered on 


2/5/19at 11:59; Admin Dose 1 TAB;  Start 1/31/19 at 22:30


Linagliptin (Tradjenta) 5 mg DAILY PO  Last administered on 2/5/19at 08:08; 


Admin Dose 5 MG;  Start 2/1/19 at 09:00


Metformin HCl (Glucophage) 500 mg BID WITH  MEALS PO  Last administered on 


2/5/19at 08:08; Admin Dose 500 MG;  Start 2/1/19 at 07:35


Ondansetron HCl (Zofran Inj) 4 mg Q6H  PRN IV NAUSEA AND/OR VOMITING;  Start 


1/31/19 at 23:00


Senna (Senokot) 1 tab HS PO  Last administered on 2/2/19at 20:00; Admin Dose 1 


TAB;  Start 2/1/19 at 21:00


Magnesium Hydroxide (Milk Of Mag) 30 ml BID  PRN PO CONSTIPATION Last 


administered on 2/1/19at 20:48; Admin Dose 30 ML;  Start 2/1/19 at 00:00


Lactulose (Enulose) 20 gm DAILY  PRN PO CONSTIPATION Last administered on 


2/2/19at 08:40; Admin Dose 20 GM;  Start 2/1/19 at 00:00


Acetaminophen (Tylenol Tab) 650 mg Q4H  PRN PO PAIN Last administered on 


2/5/19at 09:46; Admin Dose 650 MG;  Start 2/1/19 at 00:00


Miscellaneous Information (Pending [facility] Order For Wound Care) This patient 


ha... PRN  PRN XX WOUND CARE;  Start 2/1/19 at 00:00


Bisacodyl (Dulcolax Supp) 10 mg DAILY  PRN [place] CONSTIPATION Last administered on 


2/3/19at 17:58; Admin Dose 10 MG;  Start 2/2/19 at 10:30


Sodium Biphosphate/ Sodium Phosphate (Fleet Enema) 133 ml DAILY  PRN [place] 


CONSTIPATION;  Start 2/2/19 at 10:30


Polyethylene Glycol (Miralax) 17 gm DAILY  PRN PO CONSTIPATION Last administered


on 2/2/19at 12:31; Admin Dose 17 GM;  Start 2/2/19 at 10:30


Docusate Sodium (Colace) 100 mg BID PO  Last administered on 2/3/19at 20:52; Ad


min Dose 100 MG;  Start 2/3/19 at 09:00


Morphine Sulfate (morphine) 4 mg Q4H  PRN PO SEVERE PAIN LEVEL 7-10;  Start 


2/4/19 at 12:00


Celecoxib (Celebrex) 100 mg DAILY  PRN PO pain;  Start 2/5/19 at 11:30











LUCY CHEN              Feb 5, 2019 16:23

## 2019-02-05 NOTE — PN
Date/Time of Note


Date/Time of Note


DATE: 2/5/19 


TIME: 12:37





Subjective


Comfortable





Objective





Vital Signs


  Date      Temp  Pulse  Resp  B/P (MAP)   Pulse Ox  O2          O2 Flow    FiO2


Time                                                 Delivery    Rate


    2/5/19  98.5     82    20      155/74        98  Room Air


     07:30                          (101)








Intake and Output





2/4/19 2/4/19 2/5/19





1515:00


23:00


07:00





IntakeIntake Total


150 ml


1200 ml





OutputOutput Total


1000 ml





BalanceBalance


150 ml


200 ml











Exam


pulm-cta


abd-soft


max transfer





Results/Medications


Result Diagram:  


2/1/19 0543                                                                     


          2/1/19 0543





Results 24 hrs





Laboratory Tests


    Test
            2/4/19
17:42  2/4/19
21:08  2/5/19
08:05  2/5/19
11:53


    Bedside Glucose         128           157            82           154





Medications





Current Medications


Aspirin (Halfprin) 81 mg DAILY PO  Last administered on 2/5/19at 09:46; Admin 


Dose 81 MG;  Start 2/1/19 at 09:00


Atorvastatin Calcium (Lipitor) 40 mg DAILY@21 PO  Last administered on 2/4/19at 


21:06; Admin Dose 40 MG;  Start 1/31/19 at 22:30


Bisacodyl (Dulcolax) 10 mg DAILY PO  Last administered on 2/3/19at 08:56; Admin 


Dose 10 MG;  Start 2/1/19 at 09:00


Clonidine (Catapres) 0.1 mg Q6H  PRN PO ELEVATED BLOOD PRESSURE;  Start 1/31/19 


at 23:00


Miscellaneous Information 1 ea NOTE XX ;  Start 1/31/19 at 23:00


Glucose (Glutose) 15 gm Q15M  PRN PO DECREASED GLUCOSE;  Start 1/31/19 at 23:00


Glucose (Glutose) 22.5 gm Q15M  PRN PO DECREASED GLUCOSE;  Start 1/31/19 at 


23:00


Dextrose (D50w Syringe) 25 ml Q15M  PRN IV DECREASED GLUCOSE;  Start 1/31/19 at 


23:00


Dextrose (D50w Syringe) 50 ml Q15M  PRN IV DECREASED GLUCOSE;  Start 1/31/19 at 


23:00


Glucagon (Glucagen) 1 mg Q15M  PRN IM DECREASED GLUCOSE;  Start 1/31/19 at 23:00


Glucose (Glutose) 15 gm Q15M  PRN BUCCAL DECREASED GLUCOSE;  Start 1/31/19 at 


23:00


Famotidine (Pepcid) 20 mg Q12 PO  Last administered on 2/5/19at 09:45; Admin 


Dose 20 MG;  Start 1/31/19 at 22:30


Insulin Aspart (Novolog Insulin Pen) (Adult SC Insulin - Moder... WITH MEALS  


BEDTIME SC  Last administered on 2/5/19at 11:58; Admin Dose 2 UNIT;  Start 


1/31/19 at 22:30


Diagnostic Test (Pha) (Accu-Chek) 1 ea 02 XX ;  Start 2/1/19 at 02:00


Insulin Glargine (Lantus) 10 units DAILY@2000 SC  Last administered on 2/4/19at 


21:11; Admin Dose 10 UNITS;  Start 1/31/19 at 23:00


Carbidopa/Levodopa (Sinemet (25/ 100)) 1 tab TID PO  Last administered on 


2/5/19at 11:59; Admin Dose 1 TAB;  Start 1/31/19 at 22:30


Linagliptin (Tradjenta) 5 mg DAILY PO  Last administered on 2/5/19at 08:08; 


Admin Dose 5 MG;  Start 2/1/19 at 09:00


Metformin HCl (Glucophage) 500 mg BID WITH  MEALS PO  Last administered on 


2/5/19at 08:08; Admin Dose 500 MG;  Start 2/1/19 at 07:35


Ondansetron HCl (Zofran Inj) 4 mg Q6H  PRN IV NAUSEA AND/OR VOMITING;  Start 


1/31/19 at 23:00


Senna (Senokot) 1 tab HS PO  Last administered on 2/2/19at 20:00; Admin Dose 1 


TAB;  Start 2/1/19 at 21:00


Magnesium Hydroxide (Milk Of Mag) 30 ml BID  PRN PO CONSTIPATION Last 


administered on 2/1/19at 20:48; Admin Dose 30 ML;  Start 2/1/19 at 00:00


Lactulose (Enulose) 20 gm DAILY  PRN PO CONSTIPATION Last administered on 


2/2/19at 08:40; Admin Dose 20 GM;  Start 2/1/19 at 00:00


Acetaminophen (Tylenol Tab) 650 mg Q4H  PRN PO PAIN Last administered on 


2/5/19at 09:46; Admin Dose 650 MG;  Start 2/1/19 at 00:00


Miscellaneous Information (Pending Santyl Order For Wound Care) This patient 


ha... PRN  PRN XX WOUND CARE;  Start 2/1/19 at 00:00


Bisacodyl (Dulcolax Supp) 10 mg DAILY  PRN [place] CONSTIPATION Last administered on 


2/3/19at 17:58; Admin Dose 10 MG;  Start 2/2/19 at 10:30


Sodium Biphosphate/ Sodium Phosphate (Fleet Enema) 133 ml DAILY  PRN [place] 


CONSTIPATION;  Start 2/2/19 at 10:30


Polyethylene Glycol (Miralax) 17 gm DAILY  PRN PO CONSTIPATION Last administered


on 2/2/19at 12:31; Admin Dose 17 GM;  Start 2/2/19 at 10:30


Docusate Sodium (Colace) 100 mg BID PO  Last administered on 2/3/19at 20:52; 


Admin Dose 100 MG;  Start 2/3/19 at 09:00


Morphine Sulfate (morphine) 4 mg Q4H  PRN PO SEVERE PAIN LEVEL 7-10;  Start 


2/4/19 at 12:00


Celecoxib (Celebrex) 100 mg DAILY  PRN PO pain;  Start 2/5/19 at 11:30





Assessment/Plan


Additional Assessment/Plan


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


sided weakness; Parkinsons


   Continue rehab activites 


Diabetes mellitus.


Bilateral common carotid artery stenosis.


Urinary tract infection.


Dysphagia











MARS POMPA MD              Feb 5, 2019 12:37

## 2019-02-06 VITALS — DIASTOLIC BLOOD PRESSURE: 62 MMHG | HEART RATE: 82 BPM | RESPIRATION RATE: 18 BRPM | SYSTOLIC BLOOD PRESSURE: 124 MMHG

## 2019-02-06 VITALS — RESPIRATION RATE: 18 BRPM | SYSTOLIC BLOOD PRESSURE: 139 MMHG | HEART RATE: 77 BPM | DIASTOLIC BLOOD PRESSURE: 72 MMHG

## 2019-02-06 VITALS — DIASTOLIC BLOOD PRESSURE: 65 MMHG | RESPIRATION RATE: 18 BRPM | SYSTOLIC BLOOD PRESSURE: 144 MMHG | HEART RATE: 73 BPM

## 2019-02-06 VITALS — SYSTOLIC BLOOD PRESSURE: 118 MMHG | HEART RATE: 89 BPM | DIASTOLIC BLOOD PRESSURE: 69 MMHG | RESPIRATION RATE: 18 BRPM

## 2019-02-06 RX ADMIN — BISACODYL 1 MG: 5 TABLET, COATED ORAL at 09:00

## 2019-02-06 RX ADMIN — CARBIDOPA AND LEVODOPA 1 TAB: 25; 100 TABLET ORAL at 09:00

## 2019-02-06 RX ADMIN — ASPIRIN 1 MG: 81 TABLET, COATED ORAL at 09:00

## 2019-02-06 RX ADMIN — CARBIDOPA AND LEVODOPA SCH TAB: 25; 100 TABLET ORAL at 12:02

## 2019-02-06 RX ADMIN — LINAGLIPTIN SCH MG: 5 TABLET, FILM COATED ORAL at 08:19

## 2019-02-06 RX ADMIN — FAMOTIDINE SCH MG: 20 TABLET ORAL at 09:00

## 2019-02-06 RX ADMIN — FAMOTIDINE 1 MG: 20 TABLET ORAL at 20:42

## 2019-02-06 RX ADMIN — INSULIN GLARGINE SCH UNITS: 100 INJECTION, SOLUTION SUBCUTANEOUS at 20:51

## 2019-02-06 RX ADMIN — CARBIDOPA AND LEVODOPA SCH TAB: 25; 100 TABLET ORAL at 20:42

## 2019-02-06 RX ADMIN — DOCUSATE SODIUM SCH MG: 100 CAPSULE, LIQUID FILLED ORAL at 09:00

## 2019-02-06 RX ADMIN — ATORVASTATIN CALCIUM 1 MG: 40 TABLET, FILM COATED ORAL at 20:42

## 2019-02-06 RX ADMIN — ASPIRIN SCH MG: 81 TABLET, COATED ORAL at 09:00

## 2019-02-06 RX ADMIN — DOCUSATE SODIUM 1 MG: 100 CAPSULE, LIQUID FILLED ORAL at 09:00

## 2019-02-06 RX ADMIN — CELECOXIB 1 MG: 100 CAPSULE ORAL at 09:00

## 2019-02-06 RX ADMIN — INSULIN ASPART 1 UNIT: 100 INJECTION, SOLUTION INTRAVENOUS; SUBCUTANEOUS at 12:03

## 2019-02-06 RX ADMIN — FAMOTIDINE 1 MG: 20 TABLET ORAL at 09:00

## 2019-02-06 RX ADMIN — FAMOTIDINE SCH MG: 20 TABLET ORAL at 20:42

## 2019-02-06 RX ADMIN — SENNOSIDES 1 TAB: 8.6 TABLET, FILM COATED ORAL at 20:58

## 2019-02-06 RX ADMIN — LINAGLIPTIN 1 MG: 5 TABLET, FILM COATED ORAL at 08:19

## 2019-02-06 RX ADMIN — INSULIN ASPART 1 UNIT: 100 INJECTION, SOLUTION INTRAVENOUS; SUBCUTANEOUS at 20:58

## 2019-02-06 RX ADMIN — CARBIDOPA AND LEVODOPA 1 TAB: 25; 100 TABLET ORAL at 20:42

## 2019-02-06 RX ADMIN — INSULIN GLARGINE 1 UNITS: 100 INJECTION, SOLUTION SUBCUTANEOUS at 20:51

## 2019-02-06 RX ADMIN — CELECOXIB PRN MG: 100 CAPSULE ORAL at 09:00

## 2019-02-06 RX ADMIN — BISACODYL SCH MG: 5 TABLET, COATED ORAL at 09:00

## 2019-02-06 RX ADMIN — ATORVASTATIN CALCIUM SCH MG: 40 TABLET, FILM COATED ORAL at 20:42

## 2019-02-06 RX ADMIN — DOCUSATE SODIUM 1 MG: 100 CAPSULE, LIQUID FILLED ORAL at 20:57

## 2019-02-06 RX ADMIN — SENNOSIDES SCH TAB: 8.6 TABLET, FILM COATED ORAL at 20:58

## 2019-02-06 RX ADMIN — INSULIN ASPART 1 UNIT: 100 INJECTION, SOLUTION INTRAVENOUS; SUBCUTANEOUS at 07:35

## 2019-02-06 RX ADMIN — DOCUSATE SODIUM SCH MG: 100 CAPSULE, LIQUID FILLED ORAL at 20:57

## 2019-02-06 RX ADMIN — INSULIN ASPART 1 UNIT: 100 INJECTION, SOLUTION INTRAVENOUS; SUBCUTANEOUS at 17:35

## 2019-02-06 RX ADMIN — CARBIDOPA AND LEVODOPA SCH TAB: 25; 100 TABLET ORAL at 09:00

## 2019-02-06 RX ADMIN — CARBIDOPA AND LEVODOPA 1 TAB: 25; 100 TABLET ORAL at 12:02

## 2019-02-06 NOTE — PN
Date/Time of Note


Date/Time of Note


DATE: 2/6/19 


TIME: 13:07





Subjective


Patient without new complaints





Objective





Vital Signs


  Date      Temp  Pulse  Resp  B/P (MAP)   Pulse Ox  O2          O2 Flow    FiO2


Time                                                 Delivery    Rate


    2/6/19  98.6     77    18      139/72        97  Room Air


     07:30                           (94)








Intake and Output





2/5/19 2/5/19 2/6/19





1515:00


23:00


07:00





IntakeIntake Total


100 ml


860 ml


350 ml





OutputOutput Total


200 ml





BalanceBalance


100 ml


860 ml


150 ml











Exam


pulm-cta


abd-soft


max transfer





Results/Medications


Results 24 hrs





Laboratory Tests


    Test
            2/5/19
18:06  2/5/19
20:29  2/5/19
21:36  2/6/19
02:01


    Bedside Glucose         124           188           174            78


    Test
            2/6/19
07:48  2/6/19
08:16  2/6/19
12:00  



    Bedside Glucose         55  L          80           162





Medications





Current Medications


Aspirin (Halfprin) 81 mg DAILY PO  Last administered on 2/6/19at 09:00; Admin 


Dose 81 MG;  Start 2/1/19 at 09:00


Atorvastatin Calcium (Lipitor) 40 mg DAILY@21 PO  Last administered on 2/5/19at 


20:30; Admin Dose 40 MG;  Start 1/31/19 at 22:30


Bisacodyl (Dulcolax) 10 mg DAILY PO  Last administered on 2/6/19at 09:00; Admin 


Dose 10 MG;  Start 2/1/19 at 09:00


Clonidine (Catapres) 0.1 mg Q6H  PRN PO ELEVATED BLOOD PRESSURE;  Start 1/31/19 


at 23:00


Miscellaneous Information 1 ea NOTE XX ;  Start 1/31/19 at 23:00


Glucose (Glutose) 15 gm Q15M  PRN PO DECREASED GLUCOSE;  Start 1/31/19 at 23:00


Glucose (Glutose) 22.5 gm Q15M  PRN PO DECREASED GLUCOSE;  Start 1/31/19 at 


23:00


Dextrose (D50w Syringe) 25 ml Q15M  PRN IV DECREASED GLUCOSE;  Start 1/31/19 at 


23:00


Dextrose (D50w Syringe) 50 ml Q15M  PRN IV DECREASED GLUCOSE;  Start 1/31/19 at 


23:00


Glucagon (Glucagen) 1 mg Q15M  PRN IM DECREASED GLUCOSE;  Start 1/31/19 at 23:00


Glucose (Glutose) 15 gm Q15M  PRN BUCCAL DECREASED GLUCOSE;  Start 1/31/19 at 


23:00


Famotidine (Pepcid) 20 mg Q12 PO  Last administered on 2/6/19at 09:00; Admin 


Dose 20 MG;  Start 1/31/19 at 22:30


Insulin Aspart (Novolog Insulin Pen) (Adult SC Insulin - Moder... WITH MEALS  


BEDTIME SC  Last administered on 2/6/19at 12:03; Admin Dose 2 UNIT;  Start 


1/31/19 at 22:30


Diagnostic Test (Pha) (Accu-Chek) 1 ea 02 XX  Last administered on 2/6/19at 


02:37; Admin Dose 1 EA;  Start 2/1/19 at 02:00


Insulin Glargine (Lantus) 10 units DAILY@2000 SC  Last administered on 2/5/19at 


21:39; Admin Dose 10 UNITS;  Start 1/31/19 at 23:00


Carbidopa/Levodopa (Sinemet (25/ 100)) 1 tab TID PO  Last administered on 


2/6/19at 12:02; Admin Dose 1 TAB;  Start 1/31/19 at 22:30


Linagliptin (Tradjenta) 5 mg DAILY PO  Last administered on 2/6/19at 08:19; 


Admin Dose 5 MG;  Start 2/1/19 at 09:00


Metformin HCl (Glucophage) 500 mg BID WITH  MEALS PO  Last administered on 


2/6/19at 08:18; Admin Dose 500 MG;  Start 2/1/19 at 07:35


Ondansetron HCl (Zofran Inj) 4 mg Q6H  PRN IV NAUSEA AND/OR VOMITING;  Start 


1/31/19 at 23:00


Senna (Senokot) 1 tab HS PO  Last administered on 2/2/19at 20:00; Admin Dose 1 


TAB;  Start 2/1/19 at 21:00


Magnesium Hydroxide (Milk Of Mag) 30 ml BID  PRN PO CONSTIPATION Last 


administered on 2/1/19at 20:48; Admin Dose 30 ML;  Start 2/1/19 at 00:00


Lactulose (Enulose) 20 gm DAILY  PRN PO CONSTIPATION Last administered on 2/2/ 19at 08:40; Admin Dose 20 GM;  Start 2/1/19 at 00:00


Acetaminophen (Tylenol Tab) 650 mg Q4H  PRN PO PAIN Last administered on 2/ 5/19at 09:46; Admin Dose 650 MG;  Start 2/1/19 at 00:00


Miscellaneous Information (Pending Santyl Order For Wound Care) This patient 


ha... PRN  PRN XX WOUND CARE;  Start 2/1/19 at 00:00


Bisacodyl (Dulcolax Supp) 10 mg DAILY  PRN [place] CONSTIPATION Last administered on 


2/3/19at 17:58; Admin Dose 10 MG;  Start 2/2/19 at 10:30


Sodium Biphosphate/ Sodium Phosphate (Fleet Enema) 133 ml DAILY  PRN [place] 


CONSTIPATION;  Start 2/2/19 at 10:30


Polyethylene Glycol (Miralax) 17 gm DAILY  PRN PO CONSTIPATION Last administered


on 2/2/19at 12:31; Admin Dose 17 GM;  Start 2/2/19 at 10:30


Docusate Sodium (Colace) 100 mg BID PO  Last administered on 2/6/19at 09:00; 


Admin Dose 100 MG;  Start 2/3/19 at 09:00


Celecoxib (Celebrex) 100 mg DAILY  PRN PO pain Last administered on 2/6/19at 


09:00; Admin Dose 100 MG;  Start 2/5/19 at 11:30





Assessment/Plan


Additional Assessment/Plan


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


sided weakness; Parkinsons


   Continue rehab program. Current progress and goals d/w wife


Diabetes mellitus.


Bilateral common carotid artery stenosis.


Urinary tract infection.


Dysphagia











MARS POMPA MD              Feb 6, 2019 13:08

## 2019-02-06 NOTE — CONS
DATE OF ADMISSION: 01/31/2019

DATE OF CONSULTATION:  02/06/2019

 

 

 

TYPE OF CONSULTATION:  Psychological

 

REFERRING PHYSICIAN:  Mars Henry MD

 

CONSULTING PSYCHOLOGIST:  Anup Orozco, PhD

 

REASON FOR CONSULTATION:  This consultation was requested by Dr. Mami Henry in order to evaluate t
he cognitive and emotional functioning of this patient related to his present medical condition.

 

HISTORY OF PRESENT ILLNESS:  The patient is a 78-year-old male, history of Parkinson disease and diab
etes mellitus.  The patient had a sudden onset of right-sided weakness.  A workup included a CT scan 
which was negative for bleed.  A followup MRI did demonstrate a left corona radiata and frontal infar
ct/cerebrovascular accident.  The patient was cleared medically and sent to the acute rehabilitation 
unit for acute multidisciplinary rehabilitation.  The patient is motivated to get better and does wan
t to do whatever he can to help himself increase his level of functioning again.

 

FAMILY/SOCIAL HISTORY:  The patient lives in a house with his wife.  The patient does say that he has
 caregivers that help care for him and his wife.  The patient does want to return home after discharg
e.

 

SUBSTANCE USE:  The patient reports that he does not smoke.  The patient reports that he does not use
 alcohol or drugs.

 

MENTAL STATUS EXAMINATION:

APPEARANCE:  The patient was seen in bed.  He appeared to be of average height and weight.  The patie
nt is right-handed.

BEHAVIOR:  The patient was cooperative during the consultation.  The patient did attempt to answer al
l questions presented to him by the interviewer.

MOOD AND AFFECT:  The patient's mood appears to be just slightly depressed.  Affect does appear to be
 slightly anxious.  The patient reports that he is mad about having the stroke and what happened to h
im.

PERCEPTION:  The patient reports no hallucinations or delusions.  The patient was alert to person, pl
ace, situation and time.

MEMORY AND COGNITION:  The patient's memory and cognition were basically intact.  He did have some di
fficulties recalling recent and remote events, but he was able to say the name of the hospital.  The 
patient was able to say the month and the year.  He was able to say who the president of the [place] is and the governor of the state.  The patient did not know who the mayor of the [facility] is.  The patient was able to spell "world" backwards on his second try.  The patient was able t
o do only 1 serial 7 subtraction from 100 and then made 2 errors and could not go any further.  The p
atient could not remember any of the 3 words given to him after a 20-minute delay.  Overall, it seems
 that the patient may be having a very mild vascular dementia as a result of his stroke.

INTELLIGENCE:  Intelligence appears to fall in the average to above-average range.

INSIGHT:  Good.

JUDGMENT:  Good.

THOUGHT CONTENT:  The patient is concerned about his present medical condition.  The patient is frust
rated and angry about what happened to him.  The patient is motivated to get better.

 

DISCUSSION:  The patient can likely benefit from some cognitive/behavioral psychotherapy while he is 
on the unit.  The psychotherapy would focus on his underlying level of frustration and anger about wh
at happened to him.  The patient also would work on some of his cognitive issues.  The patient is goi
ng to be given a memory book to work on his overall memory to help increase his functioning cognitive
ly while he is in rehab.

 

DIAGNOSTIC IMPRESSION:

1.  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

2.  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

 

Thank you very much, Dr. Mami Henry, for referring this individual.  Please do not hesitate to keerthi quijano if you have additional questions.

 

 

Dictated By: ANUP OROZCO PHD

 

ZAIDA/ESPERANZA

DD:    02/06/2019 18:48:51

DT:    02/06/2019 19:28:07

Conf#: [id number]

DID#:  [id number]

CC: MARS HENRY MD;*EndCC*

## 2019-02-06 NOTE — PN
Date/Time of Note


Date/Time of Note


DATE: 2/6/19 


TIME: 15:30





Assessment/Plan


VTE Prophylaxis


Risk score (from Ns)>0 risk:  4


SCD applied (from Ns):  Yes


Pharmacological prophylaxis:  NA/contraindicated


Pharm contraindication:  other





Lines/Catheters


IV Catheter Type (from [facility]):  Peripheral IV


Urinary Cath still in place:  No





Assessment/Plan


Hospital Course


Pt is awake, alert, cooperative, participates in PT.


Assessment/Plan


-Acute stroke with right-sided weakness.  Continue aspirin and Lipitor.  


Continue PT and OT.  S/p evaluation by  Dr. Braden  in neurology 


consultation.  


-Encephalopathy secondary to acute stroke


-Diabetes mellitus type 2 with hemoglobin A1c 8.1.  Continue metformin and 


Tradjenta, NovoLog per mild algorithm sliding scale.


-Parkinson's disease, continue Sinemet.


-S/p UTI, completed treatment with Rocephin





Further recommendations based on clinical course.  Plan of care discussed with 


Dr. Win.


Results 24hrs





Laboratory Tests


    Test
            2/5/19
18:06  2/5/19
20:29  2/5/19
21:36  2/6/19
02:01


    Bedside Glucose         124           188           174            78


    Test
            2/6/19
07:48  2/6/19
08:16  2/6/19
12:00  



    Bedside Glucose         55  L          80           162








Exam/Review of Systems


Exam


Vitals





Vital Signs


  Date      Temp  Pulse  Resp  B/P (MAP)   Pulse Ox  O2          O2 Flow    FiO2


Time                                                 Delivery    Rate


    2/6/19  98.6     77    18      139/72        97  Room Air


     07:30                           (94)








Intake and Output





2/5/19 2/5/19 2/6/19





1515:00


23:00


07:00





IntakeIntake Total


100 ml


860 ml


350 ml





OutputOutput Total


200 ml





BalanceBalance


100 ml


860 ml


150 ml











Exam


Constitutional:  alert, oriented


Respiratory:  clear to auscultation


Cardiovascular:  nl pulse


Gastrointestinal:  soft, non-tender


Musculoskeletal:  nl extremities to inspection


Extremities:  normal pulses


Neurological:  other (R sided weakness)


Skin:  other (Right knee abrasion, healing, mid chest scar)





Results


Results 24hrs





Laboratory Tests


    Test
            2/5/19
18:06  2/5/19
20:29  2/5/19
21:36  2/6/19
02:01


    Bedside Glucose         124           188           174            78


    Test
            2/6/19
07:48  2/6/19
08:16  2/6/19
12:00  



    Bedside Glucose         55  L          80           162








Medications


Medication





Current Medications


Aspirin (Halfprin) 81 mg DAILY PO  Last administered on 2/6/19at 09:00; Admin 


Dose 81 MG;  Start 2/1/19 at 09:00


Atorvastatin Calcium (Lipitor) 40 mg DAILY@21 PO  Last administered on 2/5/19at 


20:30; Admin Dose 40 MG;  Start 1/31/19 at 22:30


Bisacodyl (Dulcolax) 10 mg DAILY PO  Last administered on 2/6/19at 09:00; Admin 


Dose 10 MG;  Start 2/1/19 at 09:00


Clonidine (Catapres) 0.1 mg Q6H  PRN PO ELEVATED BLOOD PRESSURE;  Start 1/31/19 


at 23:00


Miscellaneous Information 1 ea NOTE XX ;  Start 1/31/19 at 23:00


Glucose (Glutose) 15 gm Q15M  PRN PO DECREASED GLUCOSE;  Start 1/31/19 at 23:00


Glucose (Glutose) 22.5 gm Q15M  PRN PO DECREASED GLUCOSE;  Start 1/31/19 at 


23:00


Dextrose (D50w Syringe) 25 ml Q15M  PRN IV DECREASED GLUCOSE;  Start 1/31/19 at 


23:00


Dextrose (D50w Syringe) 50 ml Q15M  PRN IV DECREASED GLUCOSE;  Start 1/31/19 at 


23:00


Glucagon (Glucagen) 1 mg Q15M  PRN IM DECREASED GLUCOSE;  Start 1/31/19 at 23:00


Glucose (Glutose) 15 gm Q15M  PRN BUCCAL DECREASED GLUCOSE;  Start 1/31/19 at 


23:00


Famotidine (Pepcid) 20 mg Q12 PO  Last administered on 2/6/19at 09:00; Admin 


Dose 20 MG;  Start 1/31/19 at 22:30


Insulin Aspart (Novolog Insulin Pen) (Adult SC Insulin - Moder... WITH MEALS  


BEDTIME SC  Last administered on 2/6/19at 12:03; Admin Dose 2 UNIT;  Start 


1/31/19 at 22:30


Diagnostic Test (Pha) (Accu-Chek) 1 ea 02 XX  Last administered on 2/6/19at 


02:37; Admin Dose 1 EA;  Start 2/1/19 at 02:00


Insulin Glargine (Lantus) 10 units DAILY@2000 SC  Last administered on 2/5/19at 


21:39; Admin Dose 10 UNITS;  Start 1/31/19 at 23:00


Carbidopa/Levodopa (Sinemet (25/ 100)) 1 tab TID PO  Last administered on 


2/6/19at 12:02; Admin Dose 1 TAB;  Start 1/31/19 at 22:30


Linagliptin (Tradjenta) 5 mg DAILY PO  Last administered on 2/6/19at 08:19; 


Admin Dose 5 MG;  Start 2/1/19 at 09:00


Metformin HCl (Glucophage) 500 mg BID WITH  MEALS PO  Last administered on 


2/6/19at 08:18; Admin Dose 500 MG;  Start 2/1/19 at 07:35


Ondansetron HCl (Zofran Inj) 4 mg Q6H  PRN IV NAUSEA AND/OR VOMITING;  Start 


1/31/19 at 23:00


Senna (Senokot) 1 tab HS PO  Last administered on 2/2/19at 20:00; Admin Dose 1 


TAB;  Start 2/1/19 at 21:00


Magnesium Hydroxide (Milk Of Mag) 30 ml BID  PRN PO CONSTIPATION Last 


administered on 2/1/19at 20:48; Admin Dose 30 ML;  Start 2/1/19 at 00:00


Lactulose (Enulose) 20 gm DAILY  PRN PO CONSTIPATION Last administered on 


2/2/19at 08:40; Admin Dose 20 GM;  Start 2/1/19 at 00:00


Acetaminophen (Tylenol Tab) 650 mg Q4H  PRN PO PAIN Last administered on 


2/5/19at 09:46; Admin Dose 650 MG;  Start 2/1/19 at 00:00


Miscellaneous Information (Pending Santyl Order For Wound Care) This patient 


ha... PRN  PRN XX WOUND CARE;  Start 2/1/19 at 00:00


Bisacodyl (Dulcolax Supp) 10 mg DAILY  PRN [place] CONSTIPATION Last administered on 


2/3/19at 17:58; Admin Dose 10 MG;  Start 2/2/19 at 10:30


Sodium Biphosphate/ Sodium Phosphate (Fleet Enema) 133 ml DAILY  PRN [place] 


CONSTIPATION;  Start 2/2/19 at 10:30


Polyethylene Glycol (Miralax) 17 gm DAILY  PRN PO CONSTIPATION Last administered


on 2/2/19at 12:31; Admin Dose 17 GM;  Start 2/2/19 at 10:30


Docusate Sodium (Colace) 100 mg BID PO  Last administered on 2/6/19at 09:00; 


Admin Dose 100 MG;  Start 2/3/19 at 09:00


Celecoxib (Celebrex) 100 mg DAILY  PRN PO pain Last administered on 2/6/19at 


09:00; Admin Dose 100 MG;  Start 2/5/19 at 11:30











LUCY CHEN              Feb 6, 2019 15:30

## 2019-02-07 VITALS — RESPIRATION RATE: 18 BRPM | DIASTOLIC BLOOD PRESSURE: 65 MMHG | SYSTOLIC BLOOD PRESSURE: 110 MMHG | HEART RATE: 86 BPM

## 2019-02-07 VITALS — RESPIRATION RATE: 18 BRPM | DIASTOLIC BLOOD PRESSURE: 68 MMHG | SYSTOLIC BLOOD PRESSURE: 116 MMHG | HEART RATE: 85 BPM

## 2019-02-07 VITALS — SYSTOLIC BLOOD PRESSURE: 122 MMHG | HEART RATE: 82 BPM | RESPIRATION RATE: 18 BRPM | DIASTOLIC BLOOD PRESSURE: 67 MMHG

## 2019-02-07 VITALS — DIASTOLIC BLOOD PRESSURE: 69 MMHG | RESPIRATION RATE: 18 BRPM | SYSTOLIC BLOOD PRESSURE: 128 MMHG | HEART RATE: 76 BPM

## 2019-02-07 RX ADMIN — DOCUSATE SODIUM SCH MG: 100 CAPSULE, LIQUID FILLED ORAL at 20:22

## 2019-02-07 RX ADMIN — INSULIN ASPART 1 UNIT: 100 INJECTION, SOLUTION INTRAVENOUS; SUBCUTANEOUS at 07:35

## 2019-02-07 RX ADMIN — LINAGLIPTIN 1 MG: 5 TABLET, FILM COATED ORAL at 08:44

## 2019-02-07 RX ADMIN — INSULIN GLARGINE 1 UNITS: 100 INJECTION, SOLUTION SUBCUTANEOUS at 20:27

## 2019-02-07 RX ADMIN — ASPIRIN SCH MG: 81 TABLET, COATED ORAL at 08:44

## 2019-02-07 RX ADMIN — FAMOTIDINE SCH MG: 20 TABLET ORAL at 20:22

## 2019-02-07 RX ADMIN — LINAGLIPTIN SCH MG: 5 TABLET, FILM COATED ORAL at 08:44

## 2019-02-07 RX ADMIN — INSULIN ASPART 1 UNIT: 100 INJECTION, SOLUTION INTRAVENOUS; SUBCUTANEOUS at 11:53

## 2019-02-07 RX ADMIN — FAMOTIDINE 1 MG: 20 TABLET ORAL at 20:22

## 2019-02-07 RX ADMIN — SENNOSIDES 1 TAB: 8.6 TABLET, FILM COATED ORAL at 20:28

## 2019-02-07 RX ADMIN — ATORVASTATIN CALCIUM 1 MG: 40 TABLET, FILM COATED ORAL at 20:22

## 2019-02-07 RX ADMIN — CARBIDOPA AND LEVODOPA 1 TAB: 25; 100 TABLET ORAL at 12:01

## 2019-02-07 RX ADMIN — DOCUSATE SODIUM SCH MG: 100 CAPSULE, LIQUID FILLED ORAL at 08:43

## 2019-02-07 RX ADMIN — INSULIN GLARGINE SCH UNITS: 100 INJECTION, SOLUTION SUBCUTANEOUS at 20:27

## 2019-02-07 RX ADMIN — BISACODYL 1 MG: 5 TABLET, COATED ORAL at 08:43

## 2019-02-07 RX ADMIN — FAMOTIDINE 1 MG: 20 TABLET ORAL at 08:44

## 2019-02-07 RX ADMIN — CARBIDOPA AND LEVODOPA 1 TAB: 25; 100 TABLET ORAL at 20:22

## 2019-02-07 RX ADMIN — INSULIN ASPART 1 UNIT: 100 INJECTION, SOLUTION INTRAVENOUS; SUBCUTANEOUS at 20:28

## 2019-02-07 RX ADMIN — DOCUSATE SODIUM 1 MG: 100 CAPSULE, LIQUID FILLED ORAL at 08:43

## 2019-02-07 RX ADMIN — ATORVASTATIN CALCIUM SCH MG: 40 TABLET, FILM COATED ORAL at 20:22

## 2019-02-07 RX ADMIN — DOCUSATE SODIUM 1 MG: 100 CAPSULE, LIQUID FILLED ORAL at 20:22

## 2019-02-07 RX ADMIN — CARBIDOPA AND LEVODOPA SCH TAB: 25; 100 TABLET ORAL at 08:44

## 2019-02-07 RX ADMIN — SENNOSIDES SCH TAB: 8.6 TABLET, FILM COATED ORAL at 20:28

## 2019-02-07 RX ADMIN — INSULIN ASPART 1 UNIT: 100 INJECTION, SOLUTION INTRAVENOUS; SUBCUTANEOUS at 17:35

## 2019-02-07 RX ADMIN — ASPIRIN 1 MG: 81 TABLET, COATED ORAL at 08:44

## 2019-02-07 RX ADMIN — CARBIDOPA AND LEVODOPA 1 TAB: 25; 100 TABLET ORAL at 08:44

## 2019-02-07 RX ADMIN — CARBIDOPA AND LEVODOPA SCH TAB: 25; 100 TABLET ORAL at 20:22

## 2019-02-07 RX ADMIN — BISACODYL SCH MG: 5 TABLET, COATED ORAL at 08:43

## 2019-02-07 RX ADMIN — CARBIDOPA AND LEVODOPA SCH TAB: 25; 100 TABLET ORAL at 12:01

## 2019-02-07 RX ADMIN — FAMOTIDINE SCH MG: 20 TABLET ORAL at 08:44

## 2019-02-07 NOTE — PN
Date/Time of Note


Date/Time of Note


DATE: 2/7/19 


TIME: 11:56





Subjective


Comfortable





Objective





Vital Signs


  Date      Temp  Pulse  Resp  B/P (MAP)   Pulse Ox  O2          O2 Flow    FiO2


Time                                                 Delivery    Rate


    2/7/19  97.9     76    18      128/69        98  Room Air


     07:00                           (88)








Intake and Output





2/6/19 2/6/19 2/7/19





1414:59


22:59


06:59





IntakeIntake Total


200 ml


840 ml


800 ml





BalanceBalance


200 ml


840 ml


800 ml











Exam


pulm-cta


abd-soft


max





Results/Medications


Results 24 hrs





Laboratory Tests


    Test
            2/6/19
12:00  2/6/19
17:33  2/6/19
20:41  2/7/19
08:00


    Bedside Glucose         162           118           154           104





Medications





Current Medications


Aspirin (Halfprin) 81 mg DAILY PO  Last administered on 2/7/19at 08:44; Admin 


Dose 81 MG;  Start 2/1/19 at 09:00


Atorvastatin Calcium (Lipitor) 40 mg DAILY@21 PO  Last administered on 2/6/19at 


20:42; Admin Dose 40 MG;  Start 1/31/19 at 22:30


Bisacodyl (Dulcolax) 10 mg DAILY PO  Last administered on 2/7/19at 08:43; Admin 


Dose 10 MG;  Start 2/1/19 at 09:00


Clonidine (Catapres) 0.1 mg Q6H  PRN PO ELEVATED BLOOD PRESSURE;  Start 1/31/19 


at 23:00


Miscellaneous Information 1 ea NOTE XX ;  Start 1/31/19 at 23:00


Glucose (Glutose) 15 gm Q15M  PRN PO DECREASED GLUCOSE;  Start 1/31/19 at 23:00


Glucose (Glutose) 22.5 gm Q15M  PRN PO DECREASED GLUCOSE;  Start 1/31/19 at 


23:00


Dextrose (D50w Syringe) 25 ml Q15M  PRN IV DECREASED GLUCOSE;  Start 1/31/19 at 


23:00


Dextrose (D50w Syringe) 50 ml Q15M  PRN IV DECREASED GLUCOSE;  Start 1/31/19 at 


23:00


Glucagon (Glucagen) 1 mg Q15M  PRN IM DECREASED GLUCOSE;  Start 1/31/19 at 23:00


Glucose (Glutose) 15 gm Q15M  PRN BUCCAL DECREASED GLUCOSE;  Start 1/31/19 at 


23:00


Famotidine (Pepcid) 20 mg Q12 PO  Last administered on 2/7/19at 08:44; Admin 


Dose 20 MG;  Start 1/31/19 at 22:30


Insulin Aspart (Novolog Insulin Pen) (Adult SC Insulin - Moder... WITH MEALS  


BEDTIME SC  Last administered on 2/6/19at 12:03; Admin Dose 2 UNIT;  Start 


1/31/19 at 22:30


Diagnostic Test (Pha) (Accu-Chek) 1 ea 02 XX  Last administered on 2/6/19at 


02:37; Admin Dose 1 EA;  Start 2/1/19 at 02:00


Carbidopa/Levodopa (Sinemet (25/ 100)) 1 tab TID PO  Last administered on 


2/7/19at 08:44; Admin Dose 1 TAB;  Start 1/31/19 at 22:30


Linagliptin (Tradjenta) 5 mg DAILY PO  Last administered on 2/7/19at 08:44; 


Admin Dose 5 MG;  Start 2/1/19 at 09:00


Metformin HCl (Glucophage) 500 mg BID WITH  MEALS PO  Last administered on 


2/7/19at 08:01; Admin Dose 500 MG;  Start 2/1/19 at 07:35


Ondansetron HCl (Zofran Inj) 4 mg Q6H  PRN IV NAUSEA AND/OR VOMITING;  Start 


1/31/19 at 23:00


Senna (Senokot) 1 tab HS PO  Last administered on 2/2/19at 20:00; Admin Dose 1 


TAB;  Start 2/1/19 at 21:00


Magnesium Hydroxide (Milk Of Mag) 30 ml BID  PRN PO CONSTIPATION Last 


administered on 2/1/19at 20:48; Admin Dose 30 ML;  Start 2/1/19 at 00:00


Lactulose (Enulose) 20 gm DAILY  PRN PO CONSTIPATION Last administered on 


2/2/19at 08:40; Admin Dose 20 GM;  Start 2/1/19 at 00:00


Acetaminophen (Tylenol Tab) 650 mg Q4H  PRN PO PAIN Last administered on 


2/5/19at 09:46; Admin Dose 650 MG;  Start 2/1/19 at 00:00


Miscellaneous Information (Pending Santyl Order For Wound Care) This patient 


ha... PRN  PRN XX WOUND CARE;  Start 2/1/19 at 00:00


Bisacodyl (Dulcolax Supp) 10 mg DAILY  PRN MO CONSTIPATION Last administered on 


2/3/19at 17:58; Admin Dose 10 MG;  Start 2/2/19 at 10:30


Sodium Biphosphate/ Sodium Phosphate (Fleet Enema) 133 ml DAILY  PRN MO C


ONSTIPATION;  Start 2/2/19 at 10:30


Polyethylene Glycol (Miralax) 17 gm DAILY  PRN PO CONSTIPATION Last administered


on 2/2/19at 12:31; Admin Dose 17 GM;  Start 2/2/19 at 10:30


Docusate Sodium (Colace) 100 mg BID PO  Last administered on 2/7/19at 08:43; Ad


min Dose 100 MG;  Start 2/3/19 at 09:00


Celecoxib (Celebrex) 100 mg DAILY  PRN PO pain Last administered on 2/6/19at 


09:00; Admin Dose 100 MG;  Start 2/5/19 at 11:30


Insulin Glargine (Lantus) 6 units DAILY@2000 SC  Last administered on 2/6/19at 


20:51; Admin Dose 6 UNITS;  Start 2/6/19 at 20:00





Assessment/Plan


Additional Assessment/Plan


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


sided weakness; Parkinsons


   Continue rehab therapies


Diabetes mellitus.


Bilateral common carotid artery stenosis.


Urinary tract infection.


Dysphagia











MARS POMPA MD              Feb 7, 2019 11:56

## 2019-02-07 NOTE — PN
Date/Time of Note


Date/Time of Note


DATE: 2/7/19 


TIME: 19:23





Assessment/Plan


VTE Prophylaxis


Risk score (from Ns)>0 risk:  4


SCD applied (from Ns):  Yes


Pharmacological prophylaxis:  NA/contraindicated


Pharm contraindication:  other





Lines/Catheters


IV Catheter Type (from [facility]):  Saline Lock


Urinary Cath still in place:  No





Assessment/Plan


Hospital Course


Pt continues to work with PT get out of bed with sitting in the chair, remains 


hemodynamically stable awake alert.


Assessment/Plan


-Acute stroke with right-sided weakness.  Continue aspirin and Lipitor.  


Continue PT and OT.  S/p evaluation by  Dr. Braden  in neurology 


consultation.  


-Encephalopathy secondary to acute stroke


-Diabetes mellitus type 2 with hemoglobin A1c 8.1.  Continue metformin and Trad


jenta, NovoLog per mild algorithm sliding scale.


-Parkinson's disease, continue Sinemet.


-S/p UTI, completed treatment with Rocephin





Further recommendations based on clinical course.  Plan of care discussed with 


Dr. Win.


Results 24hrs





Laboratory Tests


    Test
            2/6/19
20:41  2/7/19
08:00  2/7/19
11:52  2/7/19
18:18


    Bedside Glucose         154           104           124           147








Exam/Review of Systems


Exam


Vitals





Vital Signs


  Date      Temp  Pulse  Resp  B/P (MAP)   Pulse Ox  O2          O2 Flow    FiO2


Time                                                 Delivery    Rate


    2/7/19  98.1     86    18      110/65        92  Room Air


     14:00                           (80)








Intake and Output





2/6/19 2/6/19 2/7/19





1515:00


23:00


07:00





IntakeIntake Total


200 ml


840 ml


800 ml





BalanceBalance


200 ml


840 ml


800 ml











Exam


Constitutional:  alert, oriented


Respiratory:  clear to auscultation


Cardiovascular:  nl pulse


Gastrointestinal:  soft, non-tender


Musculoskeletal:  nl extremities to inspection


Extremities:  normal pulses


Neurological:  other (R sided weakness)


Skin:  other (Right knee abrasion, healing, mid chest scar)





Results


Results 24hrs





Laboratory Tests


    Test
            2/6/19
20:41  2/7/19
08:00  2/7/19
11:52  2/7/19
18:18


    Bedside Glucose         154           104           124           147








Medications


Medication





Current Medications


Aspirin (Halfprin) 81 mg DAILY PO  Last administered on 2/7/19at 08:44; Admin 


Dose 81 MG;  Start 2/1/19 at 09:00


Atorvastatin Calcium (Lipitor) 40 mg DAILY@21 PO  Last administered on 2/6/19at 


20:42; Admin Dose 40 MG;  Start 1/31/19 at 22:30


Bisacodyl (Dulcolax) 10 mg DAILY PO  Last administered on 2/7/19at 08:43; Admin 


Dose 10 MG;  Start 2/1/19 at 09:00


Clonidine (Catapres) 0.1 mg Q6H  PRN PO ELEVATED BLOOD PRESSURE;  Start 1/31/19 


at 23:00


Miscellaneous Information 1 ea NOTE XX ;  Start 1/31/19 at 23:00


Glucose (Glutose) 15 gm Q15M  PRN PO DECREASED GLUCOSE;  Start 1/31/19 at 23:00


Glucose (Glutose) 22.5 gm Q15M  PRN PO DECREASED GLUCOSE;  Start 1/31/19 at 


23:00


Dextrose (D50w Syringe) 25 ml Q15M  PRN IV DECREASED GLUCOSE;  Start 1/31/19 at 


23:00


Dextrose (D50w Syringe) 50 ml Q15M  PRN IV DECREASED GLUCOSE;  Start 1/31/19 at 


23:00


Glucagon (Glucagen) 1 mg Q15M  PRN IM DECREASED GLUCOSE;  Start 1/31/19 at 23:00


Glucose (Glutose) 15 gm Q15M  PRN BUCCAL DECREASED GLUCOSE;  Start 1/31/19 at 


23:00


Famotidine (Pepcid) 20 mg Q12 PO  Last administered on 2/7/19at 08:44; Admin 


Dose 20 MG;  Start 1/31/19 at 22:30


Insulin Aspart (Novolog Insulin Pen) (Adult SC Insulin - Moder... WITH MEALS  


BEDTIME SC  Last administered on 2/6/19at 12:03; Admin Dose 2 UNIT;  Start 


1/31/19 at 22:30


Diagnostic Test (Pha) (Accu-Chek) 1 ea 02 XX  Last administered on 2/6/19at 


02:37; Admin Dose 1 EA;  Start 2/1/19 at 02:00


Carbidopa/Levodopa (Sinemet (25/ 100)) 1 tab TID PO  Last administered on 


2/7/19at 12:01; Admin Dose 1 TAB;  Start 1/31/19 at 22:30


Linagliptin (Tradjenta) 5 mg DAILY PO  Last administered on 2/7/19at 08:44; 


Admin Dose 5 MG;  Start 2/1/19 at 09:00


Metformin HCl (Glucophage) 500 mg BID WITH  MEALS PO  Last administered on 


2/7/19at 08:01; Admin Dose 500 MG;  Start 2/1/19 at 07:35


Ondansetron HCl (Zofran Inj) 4 mg Q6H  PRN IV NAUSEA AND/OR VOMITING;  Start 


1/31/19 at 23:00


Senna (Senokot) 1 tab HS PO  Last administered on 2/2/19at 20:00; Admin Dose 1 


TAB;  Start 2/1/19 at 21:00


Magnesium Hydroxide (Milk Of Mag) 30 ml BID  PRN PO CONSTIPATION Last 


administered on 2/1/19at 20:48; Admin Dose 30 ML;  Start 2/1/19 at 00:00


Lactulose (Enulose) 20 gm DAILY  PRN PO CONSTIPATION Last administered on 


2/2/19at 08:40; Admin Dose 20 GM;  Start 2/1/19 at 00:00


Acetaminophen (Tylenol Tab) 650 mg Q4H  PRN PO PAIN Last administered on 


2/5/19at 09:46; Admin Dose 650 MG;  Start 2/1/19 at 00:00


Miscellaneous Information (Pending [facility] Order For Wound Care) This patient 


ha... PRN  PRN XX WOUND CARE;  Start 2/1/19 at 00:00


Bisacodyl (Dulcolax Supp) 10 mg DAILY  PRN [place] CONSTIPATION Last administered on 


2/3/19at 17:58; Admin Dose 10 MG;  Start 2/2/19 at 10:30


Sodium Biphosphate/ Sodium Phosphate (Fleet Enema) 133 ml DAILY  PRN [place] 


CONSTIPATION;  Start 2/2/19 at 10:30


Polyethylene Glycol (Miralax) 17 gm DAILY  PRN PO CONSTIPATION Last administered


on 2/2/19at 12:31; Admin Dose 17 GM;  Start 2/2/19 at 10:30


Docusate Sodium (Colace) 100 mg BID PO  Last administered on 2/7/19at 08:43; 


Admin Dose 100 MG;  Start 2/3/19 at 09:00


Celecoxib (Celebrex) 100 mg DAILY  PRN PO pain Last administered on 2/6/19at 


09:00; Admin Dose 100 MG;  Start 2/5/19 at 11:30


Insulin Glargine (Lantus) 6 units DAILY@2000 SC  Last administered on 2/6/19at 


20:51; Admin Dose 6 UNITS;  Start 2/6/19 at 20:00











LUCY CHEN              Feb 7, 2019 19:24

## 2019-02-08 VITALS — RESPIRATION RATE: 20 BRPM | DIASTOLIC BLOOD PRESSURE: 67 MMHG | HEART RATE: 72 BPM | SYSTOLIC BLOOD PRESSURE: 138 MMHG

## 2019-02-08 VITALS — DIASTOLIC BLOOD PRESSURE: 67 MMHG | HEART RATE: 77 BPM | SYSTOLIC BLOOD PRESSURE: 118 MMHG | RESPIRATION RATE: 18 BRPM

## 2019-02-08 VITALS — RESPIRATION RATE: 18 BRPM | DIASTOLIC BLOOD PRESSURE: 64 MMHG | SYSTOLIC BLOOD PRESSURE: 127 MMHG | HEART RATE: 77 BPM

## 2019-02-08 VITALS — DIASTOLIC BLOOD PRESSURE: 62 MMHG | SYSTOLIC BLOOD PRESSURE: 114 MMHG | HEART RATE: 81 BPM | RESPIRATION RATE: 18 BRPM

## 2019-02-08 VITALS — SYSTOLIC BLOOD PRESSURE: 122 MMHG | DIASTOLIC BLOOD PRESSURE: 62 MMHG | RESPIRATION RATE: 18 BRPM | HEART RATE: 83 BPM

## 2019-02-08 RX ADMIN — CARBIDOPA AND LEVODOPA SCH TAB: 25; 100 TABLET ORAL at 13:49

## 2019-02-08 RX ADMIN — ASPIRIN 1 MG: 81 TABLET, COATED ORAL at 09:16

## 2019-02-08 RX ADMIN — LINAGLIPTIN 1 MG: 5 TABLET, FILM COATED ORAL at 09:17

## 2019-02-08 RX ADMIN — DOCUSATE SODIUM 1 MG: 100 CAPSULE, LIQUID FILLED ORAL at 09:17

## 2019-02-08 RX ADMIN — CARBIDOPA AND LEVODOPA SCH TAB: 25; 100 TABLET ORAL at 21:49

## 2019-02-08 RX ADMIN — FAMOTIDINE 1 MG: 20 TABLET ORAL at 09:16

## 2019-02-08 RX ADMIN — CELECOXIB PRN MG: 100 CAPSULE ORAL at 13:49

## 2019-02-08 RX ADMIN — INSULIN ASPART 1 UNIT: 100 INJECTION, SOLUTION INTRAVENOUS; SUBCUTANEOUS at 07:35

## 2019-02-08 RX ADMIN — DOCUSATE SODIUM SCH MG: 100 CAPSULE, LIQUID FILLED ORAL at 09:17

## 2019-02-08 RX ADMIN — ATORVASTATIN CALCIUM 1 MG: 40 TABLET, FILM COATED ORAL at 21:47

## 2019-02-08 RX ADMIN — BISACODYL 1 MG: 5 TABLET, COATED ORAL at 09:16

## 2019-02-08 RX ADMIN — CARBIDOPA AND LEVODOPA 1 TAB: 25; 100 TABLET ORAL at 21:49

## 2019-02-08 RX ADMIN — FAMOTIDINE SCH MG: 20 TABLET ORAL at 09:16

## 2019-02-08 RX ADMIN — BISACODYL SCH MG: 5 TABLET, COATED ORAL at 09:16

## 2019-02-08 RX ADMIN — CARBIDOPA AND LEVODOPA SCH TAB: 25; 100 TABLET ORAL at 09:17

## 2019-02-08 RX ADMIN — DOCUSATE SODIUM 1 MG: 100 CAPSULE, LIQUID FILLED ORAL at 21:00

## 2019-02-08 RX ADMIN — ASPIRIN SCH MG: 81 TABLET, COATED ORAL at 09:16

## 2019-02-08 RX ADMIN — SENNOSIDES 1 TAB: 8.6 TABLET, FILM COATED ORAL at 21:00

## 2019-02-08 RX ADMIN — INSULIN ASPART 1 UNIT: 100 INJECTION, SOLUTION INTRAVENOUS; SUBCUTANEOUS at 18:29

## 2019-02-08 RX ADMIN — FAMOTIDINE 1 MG: 20 TABLET ORAL at 21:49

## 2019-02-08 RX ADMIN — INSULIN ASPART 1 UNIT: 100 INJECTION, SOLUTION INTRAVENOUS; SUBCUTANEOUS at 21:00

## 2019-02-08 RX ADMIN — SENNOSIDES SCH TAB: 8.6 TABLET, FILM COATED ORAL at 21:00

## 2019-02-08 RX ADMIN — DOCUSATE SODIUM SCH MG: 100 CAPSULE, LIQUID FILLED ORAL at 21:00

## 2019-02-08 RX ADMIN — CARBIDOPA AND LEVODOPA 1 TAB: 25; 100 TABLET ORAL at 09:17

## 2019-02-08 RX ADMIN — FAMOTIDINE SCH MG: 20 TABLET ORAL at 21:49

## 2019-02-08 RX ADMIN — ATORVASTATIN CALCIUM SCH MG: 40 TABLET, FILM COATED ORAL at 21:47

## 2019-02-08 RX ADMIN — CARBIDOPA AND LEVODOPA 1 TAB: 25; 100 TABLET ORAL at 13:49

## 2019-02-08 RX ADMIN — INSULIN GLARGINE SCH UNITS: 100 INJECTION, SOLUTION SUBCUTANEOUS at 21:55

## 2019-02-08 RX ADMIN — CELECOXIB 1 MG: 100 CAPSULE ORAL at 13:49

## 2019-02-08 RX ADMIN — INSULIN ASPART 1 UNIT: 100 INJECTION, SOLUTION INTRAVENOUS; SUBCUTANEOUS at 11:50

## 2019-02-08 RX ADMIN — LINAGLIPTIN SCH MG: 5 TABLET, FILM COATED ORAL at 09:17

## 2019-02-08 RX ADMIN — INSULIN GLARGINE 1 UNITS: 100 INJECTION, SOLUTION SUBCUTANEOUS at 21:55

## 2019-02-08 NOTE — PN
Date/Time of Note


Date/Time of Note


DATE: 2/8/19 


TIME: 15:58





Assessment/Plan


VTE Prophylaxis


Risk score (from Ns)>0 risk:  4


SCD applied (from [facility]):  Yes


Pharmacological prophylaxis:  NA/contraindicated


Pharm contraindication:  other





Lines/Catheters


IV Catheter Type (from [facility]):  Saline Lock


Urinary Cath still in place:  No





Assessment/Plan


Hospital Course


Patient is awake, alert, diet progress to regular diet, patient continues to 


participate in physical and occupational therapy.


Assessment/Plan


-Acute stroke with right-sided weakness.  Continue aspirin and Lipitor.  


Continue PT and OT.  S/p evaluation by  Dr. Braden  in neurology 


consultation.  


-Encephalopathy secondary to acute stroke


-Diabetes mellitus type 2 with hemoglobin A1c 8.1.  Continue metformin and 


Tradjenta, NovoLog per mild algorithm sliding scale.


-Parkinson's disease, continue Sinemet.


-S/p UTI, completed treatment with Rocephin





Further recommendations based on clinical course.  Plan of care discussed with 


Dr. Win.


Results 24hrs





Laboratory Tests


    Test
            2/7/19
18:18  2/7/19
20:21  2/8/19
07:47  2/8/19
11:44


    Bedside Glucose         147           180            98           160








Exam/Review of Systems


Exam


Vitals





Vital Signs


  Date      Temp  Pulse  Resp  B/P (MAP)   Pulse Ox  O2          O2 Flow    FiO2


Time                                                 Delivery    Rate


    2/8/19  97.8     81    18      114/62        94  Room Air


     14:00                           (79)








Intake and Output





2/7/19 2/7/19 2/8/19





1515:00


23:00


07:00





IntakeIntake Total


1800 ml





OutputOutput Total


600 ml





BalanceBalance


1200 ml











Exam


Constitutional:  alert, oriented


Respiratory:  clear to auscultation


Cardiovascular:  nl pulse


Gastrointestinal:  soft, non-tender


Musculoskeletal:  nl extremities to inspection


Extremities:  normal pulses


Neurological:  other (R sided weakness)


Skin:  other (Right knee abrasion, healing, mid chest scar)





Results


Results 24hrs





Laboratory Tests


    Test
            2/7/19
18:18  2/7/19
20:21  2/8/19
07:47  2/8/19
11:44


    Bedside Glucose         147           180            98           160








Medications


Medication





Current Medications


Aspirin (Halfprin) 81 mg DAILY PO  Last administered on 2/8/19at 09:16; Admin 


Dose 81 MG;  Start 2/1/19 at 09:00


Atorvastatin Calcium (Lipitor) 40 mg DAILY@21 PO  Last administered on 2/7/19at 


20:22; Admin Dose 40 MG;  Start 1/31/19 at 22:30


Bisacodyl (Dulcolax) 10 mg DAILY PO  Last administered on 2/8/19at 09:16; Admin 


Dose 10 MG;  Start 2/1/19 at 09:00


Clonidine (Catapres) 0.1 mg Q6H  PRN PO ELEVATED BLOOD PRESSURE;  Start 1/31/19 


at 23:00


Miscellaneous Information 1 ea NOTE XX ;  Start 1/31/19 at 23:00


Glucose (Glutose) 15 gm Q15M  PRN PO DECREASED GLUCOSE;  Start 1/31/19 at 23:00


Glucose (Glutose) 22.5 gm Q15M  PRN PO DECREASED GLUCOSE;  Start 1/31/19 at 


23:00


Dextrose (D50w Syringe) 25 ml Q15M  PRN IV DECREASED GLUCOSE;  Start 1/31/19 at 


23:00


Dextrose (D50w Syringe) 50 ml Q15M  PRN IV DECREASED GLUCOSE;  Start 1/31/19 at 


23:00


Glucagon (Glucagen) 1 mg Q15M  PRN IM DECREASED GLUCOSE;  Start 1/31/19 at 23:00


Glucose (Glutose) 15 gm Q15M  PRN BUCCAL DECREASED GLUCOSE;  Start 1/31/19 at 


23:00


Famotidine (Pepcid) 20 mg Q12 PO  Last administered on 2/8/19at 09:16; Admin 


Dose 20 MG;  Start 1/31/19 at 22:30


Insulin Aspart (Novolog Insulin Pen) (Adult SC Insulin - Moder... WITH MEALS  


BEDTIME SC  Last administered on 2/8/19at 11:50; Admin Dose 2 UNIT;  Start 


1/31/19 at 22:30


Diagnostic Test (Pha) (Accu-Chek) 1 ea 02 XX  Last administered on 2/6/19at 


02:37; Admin Dose 1 EA;  Start 2/1/19 at 02:00


Carbidopa/Levodopa (Sinemet (25/ 100)) 1 tab TID PO  Last administered on 


2/8/19at 13:49; Admin Dose 1 TAB;  Start 1/31/19 at 22:30


Linagliptin (Tradjenta) 5 mg DAILY PO  Last administered on 2/8/19at 09:17; 


Admin Dose 5 MG;  Start 2/1/19 at 09:00


Metformin HCl (Glucophage) 500 mg BID WITH  MEALS PO  Last administered on 


2/8/19at 07:49; Admin Dose 500 MG;  Start 2/1/19 at 07:35


Ondansetron HCl (Zofran Inj) 4 mg Q6H  PRN IV NAUSEA AND/OR VOMITING;  Start 


1/31/19 at 23:00


Senna (Senokot) 1 tab HS PO  Last administered on 2/2/19at 20:00; Admin Dose 1 


TAB;  Start 2/1/19 at 21:00


Magnesium Hydroxide (Milk Of Mag) 30 ml BID  PRN PO CONSTIPATION Last 


administered on 2/1/19at 20:48; Admin Dose 30 ML;  Start 2/1/19 at 00:00


Lactulose (Enulose) 20 gm DAILY  PRN PO CONSTIPATION Last administered on 


2/2/19at 08:40; Admin Dose 20 GM;  Start 2/1/19 at 00:00


Acetaminophen (Tylenol Tab) 650 mg Q4H  PRN PO PAIN Last administered on 


2/5/19at 09:46; Admin Dose 650 MG;  Start 2/1/19 at 00:00


Miscellaneous Information (Pending [facility] Order For Wound Care) This patient 


ha... PRN  PRN XX WOUND CARE;  Start 2/1/19 at 00:00


Bisacodyl (Dulcolax Supp) 10 mg DAILY  PRN [place] CONSTIPATION Last administered on 


2/3/19at 17:58; Admin Dose 10 MG;  Start 2/2/19 at 10:30


Sodium Biphosphate/ Sodium Phosphate (Fleet Enema) 133 ml DAILY  PRN [place] CONST


IPATION;  Start 2/2/19 at 10:30


Polyethylene Glycol (Miralax) 17 gm DAILY  PRN PO CONSTIPATION Last administered


on 2/2/19at 12:31; Admin Dose 17 GM;  Start 2/2/19 at 10:30


Docusate Sodium (Colace) 100 mg BID PO  Last administered on 2/8/19at 09:17; 


Admin Dose 100 MG;  Start 2/3/19 at 09:00


Celecoxib (Celebrex) 100 mg DAILY  PRN PO pain Last administered on 2/8/19at 


13:49; Admin Dose 100 MG;  Start 2/5/19 at 11:30


Insulin Glargine (Lantus) 6 units DAILY@2000 SC  Last administered on 2/7/19at 


20:27; Admin Dose 6 UNITS;  Start 2/6/19 at 20:00











LUCY CHEN              Feb 8, 2019 16:03

## 2019-02-08 NOTE — PN
Date/Time of Note


Date/Time of Note


DATE: 2/8/19 


TIME: 13:36





Subjective


family confernece held with wife, caregivers and patient





Objective





Vital Signs


  Date      Temp  Pulse  Resp  B/P (MAP)   Pulse Ox  O2          O2 Flow    FiO2


Time                                                 Delivery    Rate


    2/8/19  98.4     72    20      138/67        95  Room Air


     07:30                           (90)








Intake and Output





2/7/19 2/7/19 2/8/19





1515:00


23:00


07:00





IntakeIntake Total


1800 ml





OutputOutput Total


600 ml





BalanceBalance


1200 ml











Exam


pulm-cta


bd-soft


max





Results/Medications


Results 24 hrs





Laboratory Tests


    Test
            2/7/19
18:18  2/7/19
20:21  2/8/19
07:47  2/8/19
11:44


    Bedside Glucose         147           180            98           160





Medications





Current Medications


Aspirin (Halfprin) 81 mg DAILY PO  Last administered on 2/8/19at 09:16; Admin 


Dose 81 MG;  Start 2/1/19 at 09:00


Atorvastatin Calcium (Lipitor) 40 mg DAILY@21 PO  Last administered on 2/7/19at 


20:22; Admin Dose 40 MG;  Start 1/31/19 at 22:30


Bisacodyl (Dulcolax) 10 mg DAILY PO  Last administered on 2/8/19at 09:16; Admin 


Dose 10 MG;  Start 2/1/19 at 09:00


Clonidine (Catapres) 0.1 mg Q6H  PRN PO ELEVATED BLOOD PRESSURE;  Start 1/31/19 


at 23:00


Miscellaneous Information 1 ea NOTE XX ;  Start 1/31/19 at 23:00


Glucose (Glutose) 15 gm Q15M  PRN PO DECREASED GLUCOSE;  Start 1/31/19 at 23:00


Glucose (Glutose) 22.5 gm Q15M  PRN PO DECREASED GLUCOSE;  Start 1/31/19 at 


23:00


Dextrose (D50w Syringe) 25 ml Q15M  PRN IV DECREASED GLUCOSE;  Start 1/31/19 at 


23:00


Dextrose (D50w Syringe) 50 ml Q15M  PRN IV DECREASED GLUCOSE;  Start 1/31/19 at 


23:00


Glucagon (Glucagen) 1 mg Q15M  PRN IM DECREASED GLUCOSE;  Start 1/31/19 at 23:00


Glucose (Glutose) 15 gm Q15M  PRN BUCCAL DECREASED GLUCOSE;  Start 1/31/19 at 


23:00


Famotidine (Pepcid) 20 mg Q12 PO  Last administered on 2/8/19at 09:16; Admin 


Dose 20 MG;  Start 1/31/19 at 22:30


Insulin Aspart (Novolog Insulin Pen) (Adult SC Insulin - Moder... WITH MEALS  


BEDTIME SC  Last administered on 2/8/19at 11:50; Admin Dose 2 UNIT;  Start 


1/31/19 at 22:30


Diagnostic Test (Pha) (Accu-Chek) 1 ea 02 XX  Last administered on 2/6/19at 


02:37; Admin Dose 1 EA;  Start 2/1/19 at 02:00


Carbidopa/Levodopa (Sinemet (25/ 100)) 1 tab TID PO  Last administered on 


2/8/19at 09:17; Admin Dose 1 TAB;  Start 1/31/19 at 22:30


Linagliptin (Tradjenta) 5 mg DAILY PO  Last administered on 2/8/19at 09:17; 


Admin Dose 5 MG;  Start 2/1/19 at 09:00


Metformin HCl (Glucophage) 500 mg BID WITH  MEALS PO  Last administered on 


2/8/19at 07:49; Admin Dose 500 MG;  Start 2/1/19 at 07:35


Ondansetron HCl (Zofran Inj) 4 mg Q6H  PRN IV NAUSEA AND/OR VOMITING;  Start 


1/31/19 at 23:00


Senna (Senokot) 1 tab HS PO  Last administered on 2/2/19at 20:00; Admin Dose 1 


TAB;  Start 2/1/19 at 21:00


Magnesium Hydroxide (Milk Of Mag) 30 ml BID  PRN PO CONSTIPATION Last 


administered on 2/1/19at 20:48; Admin Dose 30 ML;  Start 2/1/19 at 00:00


Lactulose (Enulose) 20 gm DAILY  PRN PO CONSTIPATION Last administered on 


2/2/19at 08:40; Admin Dose 20 GM;  Start 2/1/19 at 00:00


Acetaminophen (Tylenol Tab) 650 mg Q4H  PRN PO PAIN Last administered on 


2/5/19at 09:46; Admin Dose 650 MG;  Start 2/1/19 at 00:00


Miscellaneous Information (Pending [facility] Order For Wound Care) This patient 


ha... PRN  PRN XX WOUND CARE;  Start 2/1/19 at 00:00


Bisacodyl (Dulcolax Supp) 10 mg DAILY  PRN [place] CONSTIPATION Last administered on 


2/3/19at 17:58; Admin Dose 10 MG;  Start 2/2/19 at 10:30


Sodium Biphosphate/ Sodium Phosphate (Fleet Enema) 133 ml DAILY  PRN [place] 


CONSTIPATION;  Start 2/2/19 at 10:30


Polyethylene Glycol (Miralax) 17 gm DAILY  PRN PO CONSTIPATION Last administered


on 2/2/19at 12:31; Admin Dose 17 GM;  Start 2/2/19 at 10:30


Docusate Sodium (Colace) 100 mg BID PO  Last administered on 2/8/19at 09:17; 


Admin Dose 100 MG;  Start 2/3/19 at 09:00


Celecoxib (Celebrex) 100 mg DAILY  PRN PO pain Last administered on 2/6/19at 


09:00; Admin Dose 100 MG;  Start 2/5/19 at 11:30


Insulin Glargine (Lantus) 6 units DAILY@2000 SC  Last administered on 2/7/19at 


20:27; Admin Dose 6 UNITS;  Start 2/6/19 at 20:00





Assessment/Plan


Additional Assessment/Plan


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


sided weakness; Parkinsons


   Continue rehab program. family conference with  wife and patient


Diabetes mellitus.


Bilateral common carotid artery stenosis.


Urinary tract infection.


Dysphagia











MARS POMPA MD              Feb 8, 2019 13:36

## 2019-02-09 VITALS — SYSTOLIC BLOOD PRESSURE: 124 MMHG | RESPIRATION RATE: 18 BRPM | DIASTOLIC BLOOD PRESSURE: 68 MMHG | HEART RATE: 74 BPM

## 2019-02-09 VITALS — SYSTOLIC BLOOD PRESSURE: 131 MMHG | DIASTOLIC BLOOD PRESSURE: 66 MMHG | RESPIRATION RATE: 18 BRPM | HEART RATE: 79 BPM

## 2019-02-09 VITALS — DIASTOLIC BLOOD PRESSURE: 61 MMHG | SYSTOLIC BLOOD PRESSURE: 115 MMHG | RESPIRATION RATE: 18 BRPM | HEART RATE: 80 BPM

## 2019-02-09 VITALS — SYSTOLIC BLOOD PRESSURE: 114 MMHG | DIASTOLIC BLOOD PRESSURE: 65 MMHG | RESPIRATION RATE: 18 BRPM | HEART RATE: 76 BPM

## 2019-02-09 RX ADMIN — FAMOTIDINE SCH MG: 20 TABLET ORAL at 08:16

## 2019-02-09 RX ADMIN — CARBIDOPA AND LEVODOPA 1 TAB: 25; 100 TABLET ORAL at 08:16

## 2019-02-09 RX ADMIN — DOCUSATE SODIUM SCH MG: 100 CAPSULE, LIQUID FILLED ORAL at 08:42

## 2019-02-09 RX ADMIN — INSULIN ASPART 1 UNIT: 100 INJECTION, SOLUTION INTRAVENOUS; SUBCUTANEOUS at 20:41

## 2019-02-09 RX ADMIN — LINAGLIPTIN 1 MG: 5 TABLET, FILM COATED ORAL at 08:16

## 2019-02-09 RX ADMIN — INSULIN GLARGINE 1 UNITS: 100 INJECTION, SOLUTION SUBCUTANEOUS at 20:41

## 2019-02-09 RX ADMIN — DOCUSATE SODIUM 1 MG: 100 CAPSULE, LIQUID FILLED ORAL at 08:42

## 2019-02-09 RX ADMIN — ATORVASTATIN CALCIUM SCH MG: 40 TABLET, FILM COATED ORAL at 20:38

## 2019-02-09 RX ADMIN — BISACODYL SCH MG: 5 TABLET, COATED ORAL at 08:42

## 2019-02-09 RX ADMIN — SENNOSIDES 1 TAB: 8.6 TABLET, FILM COATED ORAL at 20:38

## 2019-02-09 RX ADMIN — CARBIDOPA AND LEVODOPA 1 TAB: 25; 100 TABLET ORAL at 20:38

## 2019-02-09 RX ADMIN — DOCUSATE SODIUM SCH MG: 100 CAPSULE, LIQUID FILLED ORAL at 20:38

## 2019-02-09 RX ADMIN — ASPIRIN SCH MG: 81 TABLET, COATED ORAL at 08:16

## 2019-02-09 RX ADMIN — ASPIRIN 1 MG: 81 TABLET, COATED ORAL at 08:16

## 2019-02-09 RX ADMIN — FAMOTIDINE 1 MG: 20 TABLET ORAL at 20:38

## 2019-02-09 RX ADMIN — BISACODYL 1 MG: 5 TABLET, COATED ORAL at 08:42

## 2019-02-09 RX ADMIN — FAMOTIDINE 1 MG: 20 TABLET ORAL at 08:16

## 2019-02-09 RX ADMIN — LINAGLIPTIN SCH MG: 5 TABLET, FILM COATED ORAL at 08:16

## 2019-02-09 RX ADMIN — CARBIDOPA AND LEVODOPA 1 TAB: 25; 100 TABLET ORAL at 12:47

## 2019-02-09 RX ADMIN — CARBIDOPA AND LEVODOPA SCH TAB: 25; 100 TABLET ORAL at 20:38

## 2019-02-09 RX ADMIN — INSULIN ASPART 1 UNIT: 100 INJECTION, SOLUTION INTRAVENOUS; SUBCUTANEOUS at 07:35

## 2019-02-09 RX ADMIN — DOCUSATE SODIUM 1 MG: 100 CAPSULE, LIQUID FILLED ORAL at 20:38

## 2019-02-09 RX ADMIN — FAMOTIDINE SCH MG: 20 TABLET ORAL at 20:38

## 2019-02-09 RX ADMIN — INSULIN ASPART 1 UNIT: 100 INJECTION, SOLUTION INTRAVENOUS; SUBCUTANEOUS at 12:20

## 2019-02-09 RX ADMIN — INSULIN GLARGINE SCH UNITS: 100 INJECTION, SOLUTION SUBCUTANEOUS at 20:41

## 2019-02-09 RX ADMIN — SENNOSIDES SCH TAB: 8.6 TABLET, FILM COATED ORAL at 20:38

## 2019-02-09 RX ADMIN — CARBIDOPA AND LEVODOPA SCH TAB: 25; 100 TABLET ORAL at 08:16

## 2019-02-09 RX ADMIN — BISACODYL SCH MG: 5 TABLET, COATED ORAL at 08:16

## 2019-02-09 RX ADMIN — CARBIDOPA AND LEVODOPA SCH TAB: 25; 100 TABLET ORAL at 12:47

## 2019-02-09 RX ADMIN — DOCUSATE SODIUM 1 MG: 100 CAPSULE, LIQUID FILLED ORAL at 08:16

## 2019-02-09 RX ADMIN — ATORVASTATIN CALCIUM 1 MG: 40 TABLET, FILM COATED ORAL at 20:38

## 2019-02-09 RX ADMIN — BISACODYL 1 MG: 5 TABLET, COATED ORAL at 08:16

## 2019-02-09 RX ADMIN — INSULIN ASPART 1 UNIT: 100 INJECTION, SOLUTION INTRAVENOUS; SUBCUTANEOUS at 17:36

## 2019-02-09 RX ADMIN — DOCUSATE SODIUM SCH MG: 100 CAPSULE, LIQUID FILLED ORAL at 08:16

## 2019-02-09 NOTE — PN
Date/Time of Note


Date/Time of Note


DATE: 2/9/19 


TIME: 11:48





Assessment/Plan


VTE Prophylaxis


Risk score (from Ns)>0 risk:  4


SCD applied (from Ns):  Yes


Pharmacological prophylaxis:  LMWH





Lines/Catheters


IV Catheter Type (from [facility]):  Saline Lock


Urinary Cath still in place:  No





Assessment/Plan


Hospital Course


-Acute stroke with right-sided weakness.  Continue aspirin and Lipitor.  


Continue PT and OT.  S/p evaluation by  Dr. Braden  in neurology 


consultation.  


-Encephalopathy secondary to acute stroke


-Diabetes mellitus type 2 with hemoglobin A1c 8.1.  Continue metformin and 


Tradjenta, NovoLog per mild algorithm sliding scale.


-Parkinson's disease, continue Sinemet.


-S/p UTI, completed treatment with Rocephin


Results 24hrs





Laboratory Tests


           Test
            2/8/19
18:23  2/8/19
21:45  2/9/19
08:14


           Bedside Glucose         150           135            96








Subjective


24 Hr Interval Summary


Free Text/Dictation


Patient still have right sided weakness and poor coordination





Exam/Review of Systems


Exam


Vitals





Vital Signs


  Date      Temp  Pulse  Resp  B/P (MAP)   Pulse Ox  O2          O2 Flow    FiO2


Time                                                 Delivery    Rate


    2/9/19  97.7     79    18      131/66        99  Room Air


     07:00                           (87)








Intake and Output





2/8/19 2/8/19 2/9/19





1515:00


23:00


07:00





IntakeIntake Total


750 ml


680 ml


1050 ml





BalanceBalance


750 ml


680 ml


1050 ml











Constitutional:  well developed


Head:  normocephalic, atraumatic


Neck:  supple


Respiratory:  diminished breath sounds


Cardiovascular:  regular rate and rhythm


Gastrointestinal:  soft, non-tender


Extremities:  normal pulses





Results


Results 24hrs





Laboratory Tests


           Test
            2/8/19
18:23  2/8/19
21:45  2/9/19
08:14


           Bedside Glucose         150           135            96








Medications


Medication





Current Medications


Aspirin (Halfprin) 81 mg DAILY PO  Last administered on 2/9/19at 08:16; Admin 


Dose 81 MG;  Start 2/1/19 at 09:00


Atorvastatin Calcium (Lipitor) 40 mg DAILY@21 PO  Last administered on 2/8/19at 


21:47; Admin Dose 40 MG;  Start 1/31/19 at 22:30


Bisacodyl (Dulcolax) 10 mg DAILY PO  Last administered on 2/8/19at 09:16; Admin 


Dose 10 MG;  Start 2/1/19 at 09:00


Clonidine (Catapres) 0.1 mg Q6H  PRN PO ELEVATED BLOOD PRESSURE;  Start 1/31/19 


at 23:00


Miscellaneous Information 1 ea NOTE XX ;  Start 1/31/19 at 23:00


Glucose (Glutose) 15 gm Q15M  PRN PO DECREASED GLUCOSE;  Start 1/31/19 at 23:00


Glucose (Glutose) 22.5 gm Q15M  PRN PO DECREASED GLUCOSE;  Start 1/31/19 at 


23:00


Dextrose (D50w Syringe) 25 ml Q15M  PRN IV DECREASED GLUCOSE;  Start 1/31/19 at 


23:00


Dextrose (D50w Syringe) 50 ml Q15M  PRN IV DECREASED GLUCOSE;  Start 1/31/19 at 


23:00


Glucagon (Glucagen) 1 mg Q15M  PRN IM DECREASED GLUCOSE;  Start 1/31/19 at 23:00


Glucose (Glutose) 15 gm Q15M  PRN BUCCAL DECREASED GLUCOSE;  Start 1/31/19 at 


23:00


Famotidine (Pepcid) 20 mg Q12 PO  Last administered on 2/9/19at 08:16; Admin 


Dose 20 MG;  Start 1/31/19 at 22:30


Insulin Aspart (Novolog Insulin Pen) (Adult SC Insulin - Moder... WITH MEALS  


BEDTIME SC  Last administered on 2/8/19at 18:29; Admin Dose 2 UNIT;  Start 


1/31/19 at 22:30


Diagnostic Test (Pha) (Accu-Chek) 1 ea 02 XX  Last administered on 2/6/19at 


02:37; Admin Dose 1 EA;  Start 2/1/19 at 02:00


Carbidopa/Levodopa (Sinemet (25/ 100)) 1 tab TID PO  Last administered on 


2/9/19at 08:16; Admin Dose 1 TAB;  Start 1/31/19 at 22:30


Linagliptin (Tradjenta) 5 mg DAILY PO  Last administered on 2/9/19at 08:16; 


Admin Dose 5 MG;  Start 2/1/19 at 09:00


Metformin HCl (Glucophage) 500 mg BID WITH  MEALS PO  Last administered on 


2/9/19at 08:16; Admin Dose 500 MG;  Start 2/1/19 at 07:35


Ondansetron HCl (Zofran Inj) 4 mg Q6H  PRN IV NAUSEA AND/OR VOMITING;  Start 


1/31/19 at 23:00


Senna (Senokot) 1 tab HS PO  Last administered on 2/2/19at 20:00; Admin Dose 1 


TAB;  Start 2/1/19 at 21:00


Magnesium Hydroxide (Milk Of Mag) 30 ml BID  PRN PO CONSTIPATION Last 


administered on 2/1/19at 20:48; Admin Dose 30 ML;  Start 2/1/19 at 00:00


Lactulose (Enulose) 20 gm DAILY  PRN PO CONSTIPATION Last administered on 


2/2/19at 08:40; Admin Dose 20 GM;  Start 2/1/19 at 00:00


Acetaminophen (Tylenol Tab) 650 mg Q4H  PRN PO PAIN Last administered on 


2/5/19at 09:46; Admin Dose 650 MG;  Start 2/1/19 at 00:00


Miscellaneous Information (Pending [facility] Order For Wound Care) This patient 


ha... PRN  PRN XX WOUND CARE;  Start 2/1/19 at 00:00


Bisacodyl (Dulcolax Supp) 10 mg DAILY  PRN CT CONSTIPATION Last administered on 


2/3/19at 17:58; Admin Dose 10 MG;  Start 2/2/19 at 10:30


Sodium Biphosphate/ Sodium Phosphate (Fleet Enema) 133 ml DAILY  PRN CT 


CONSTIPATION;  Start 2/2/19 at 10:30


Polyethylene Glycol (Miralax) 17 gm DAILY  PRN PO CONSTIPATION Last administered


on 2/2/19at 12:31; Admin Dose 17 GM;  Start 2/2/19 at 10:30


Docusate Sodium (Colace) 100 mg BID PO  Last administered on 2/8/19at 09:17; 


Admin Dose 100 MG;  Start 2/3/19 at 09:00


Celecoxib (Celebrex) 100 mg DAILY  PRN PO pain Last administered on 2/8/19at 


13:49; Admin Dose 100 MG;  Start 2/5/19 at 11:30


Insulin Glargine (Lantus) 6 units DAILY@2000 SC  Last administered on 2/8/19at 


21:55; Admin Dose 6 UNITS;  Start 2/6/19 at 20:00











DIPTI DOYLE                    Feb 9, 2019 11:49

## 2019-02-09 NOTE — PN
Date/Time of Note


Date/Time of Note


DATE: 2/9/19 


TIME: 08:44





Subjective


Patient comfortable





Objective





Vital Signs


  Date      Temp  Pulse  Resp  B/P (MAP)   Pulse Ox  O2          O2 Flow    FiO2


Time                                                 Delivery    Rate


    2/9/19  98.2     74    18      124/68        95  Room Air


     02:00                           (86)








Intake and Output





2/8/19 2/8/19 2/9/19





1515:00


23:00


07:00





IntakeIntake Total


750 ml


680 ml


1050 ml





BalanceBalance


750 ml


680 ml


1050 ml











Exam


pulm-cta


abd-soft


max





Results/Medications


Results 24 hrs





Laboratory Tests


    Test
            2/8/19
11:44  2/8/19
18:23  2/8/19
21:45  2/9/19
08:14


    Bedside Glucose         160           150           135            96





Medications





Current Medications


Aspirin (Halfprin) 81 mg DAILY PO  Last administered on 2/9/19at 08:16; Admin 


Dose 81 MG;  Start 2/1/19 at 09:00


Atorvastatin Calcium (Lipitor) 40 mg DAILY@21 PO  Last administered on 2/8/19at 


21:47; Admin Dose 40 MG;  Start 1/31/19 at 22:30


Bisacodyl (Dulcolax) 10 mg DAILY PO  Last administered on 2/8/19at 09:16; Admin 


Dose 10 MG;  Start 2/1/19 at 09:00


Clonidine (Catapres) 0.1 mg Q6H  PRN PO ELEVATED BLOOD PRESSURE;  Start 1/31/19 


at 23:00


Miscellaneous Information 1 ea NOTE XX ;  Start 1/31/19 at 23:00


Glucose (Glutose) 15 gm Q15M  PRN PO DECREASED GLUCOSE;  Start 1/31/19 at 23:00


Glucose (Glutose) 22.5 gm Q15M  PRN PO DECREASED GLUCOSE;  Start 1/31/19 at 


23:00


Dextrose (D50w Syringe) 25 ml Q15M  PRN IV DECREASED GLUCOSE;  Start 1/31/19 at 


23:00


Dextrose (D50w Syringe) 50 ml Q15M  PRN IV DECREASED GLUCOSE;  Start 1/31/19 at 


23:00


Glucagon (Glucagen) 1 mg Q15M  PRN IM DECREASED GLUCOSE;  Start 1/31/19 at 23:00


Glucose (Glutose) 15 gm Q15M  PRN BUCCAL DECREASED GLUCOSE;  Start 1/31/19 at 


23:00


Famotidine (Pepcid) 20 mg Q12 PO  Last administered on 2/9/19at 08:16; Admin 


Dose 20 MG;  Start 1/31/19 at 22:30


Insulin Aspart (Novolog Insulin Pen) (Adult SC Insulin - Moder... WITH MEALS  


BEDTIME SC  Last administered on 2/8/19at 18:29; Admin Dose 2 UNIT;  Start 


1/31/19 at 22:30


Diagnostic Test (Pha) (Accu-Chek) 1 ea 02 XX  Last administered on 2/6/19at 


02:37; Admin Dose 1 EA;  Start 2/1/19 at 02:00


Carbidopa/Levodopa (Sinemet (25/ 100)) 1 tab TID PO  Last administered on 


2/9/19at 08:16; Admin Dose 1 TAB;  Start 1/31/19 at 22:30


Linagliptin (Tradjenta) 5 mg DAILY PO  Last administered on 2/9/19at 08:16; 


Admin Dose 5 MG;  Start 2/1/19 at 09:00


Metformin HCl (Glucophage) 500 mg BID WITH  MEALS PO  Last administered on 


2/9/19at 08:16; Admin Dose 500 MG;  Start 2/1/19 at 07:35


Ondansetron HCl (Zofran Inj) 4 mg Q6H  PRN IV NAUSEA AND/OR VOMITING;  Start 


1/31/19 at 23:00


Senna (Senokot) 1 tab HS PO  Last administered on 2/2/19at 20:00; Admin Dose 1 


TAB;  Start 2/1/19 at 21:00


Magnesium Hydroxide (Milk Of Mag) 30 ml BID  PRN PO CONSTIPATION Last 


administered on 2/1/19at 20:48; Admin Dose 30 ML;  Start 2/1/19 at 00:00


Lactulose (Enulose) 20 gm DAILY  PRN PO CONSTIPATION Last administered on 


2/2/19at 08:40; Admin Dose 20 GM;  Start 2/1/19 at 00:00


Acetaminophen (Tylenol Tab) 650 mg Q4H  PRN PO PAIN Last administered on 2/5/1


9at 09:46; Admin Dose 650 MG;  Start 2/1/19 at 00:00


Miscellaneous Information (Pending Santyl Order For Wound Care) This patient 


ha... PRN  PRN XX WOUND CARE;  Start 2/1/19 at 00:00


Bisacodyl (Dulcolax Supp) 10 mg DAILY  PRN [place] CONSTIPATION Last administered on 


2/3/19at 17:58; Admin Dose 10 MG;  Start 2/2/19 at 10:30


Sodium Biphosphate/ Sodium Phosphate (Fleet Enema) 133 ml DAILY  PRN [place] 


CONSTIPATION;  Start 2/2/19 at 10:30


Polyethylene Glycol (Miralax) 17 gm DAILY  PRN PO CONSTIPATION Last administered


on 2/2/19at 12:31; Admin Dose 17 GM;  Start 2/2/19 at 10:30


Docusate Sodium (Colace) 100 mg BID PO  Last administered on 2/8/19at 09:17; 


Admin Dose 100 MG;  Start 2/3/19 at 09:00


Celecoxib (Celebrex) 100 mg DAILY  PRN PO pain Last administered on 2/8/19at 13


:49; Admin Dose 100 MG;  Start 2/5/19 at 11:30


Insulin Glargine (Lantus) 6 units DAILY@2000 SC  Last administered on 2/8/19at 


21:55; Admin Dose 6 UNITS;  Start 2/6/19 at 20:00





Assessment/Plan


Additional Assessment/Plan


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


sided weakness; Parkinsons


   Continue rehab activities


Diabetes mellitus.


Bilateral common carotid artery stenosis.


Urinary tract infection.


Dysphagia











MARS POMPA MD              Feb 9, 2019 08:45

## 2019-02-10 VITALS — RESPIRATION RATE: 18 BRPM | DIASTOLIC BLOOD PRESSURE: 69 MMHG | HEART RATE: 64 BPM | SYSTOLIC BLOOD PRESSURE: 134 MMHG

## 2019-02-10 VITALS — HEART RATE: 71 BPM | RESPIRATION RATE: 19 BRPM | DIASTOLIC BLOOD PRESSURE: 62 MMHG | SYSTOLIC BLOOD PRESSURE: 123 MMHG

## 2019-02-10 VITALS — HEART RATE: 77 BPM | SYSTOLIC BLOOD PRESSURE: 134 MMHG | RESPIRATION RATE: 20 BRPM | DIASTOLIC BLOOD PRESSURE: 68 MMHG

## 2019-02-10 VITALS — HEART RATE: 62 BPM | RESPIRATION RATE: 16 BRPM | SYSTOLIC BLOOD PRESSURE: 130 MMHG | DIASTOLIC BLOOD PRESSURE: 64 MMHG

## 2019-02-10 VITALS — DIASTOLIC BLOOD PRESSURE: 67 MMHG | HEART RATE: 75 BPM | RESPIRATION RATE: 18 BRPM | SYSTOLIC BLOOD PRESSURE: 125 MMHG

## 2019-02-10 RX ADMIN — CARBIDOPA AND LEVODOPA SCH TAB: 25; 100 TABLET ORAL at 09:21

## 2019-02-10 RX ADMIN — FAMOTIDINE 1 MG: 20 TABLET ORAL at 20:42

## 2019-02-10 RX ADMIN — ATORVASTATIN CALCIUM 1 MG: 40 TABLET, FILM COATED ORAL at 20:42

## 2019-02-10 RX ADMIN — CARBIDOPA AND LEVODOPA 1 TAB: 25; 100 TABLET ORAL at 20:46

## 2019-02-10 RX ADMIN — DOCUSATE SODIUM 1 MG: 100 CAPSULE, LIQUID FILLED ORAL at 09:21

## 2019-02-10 RX ADMIN — INSULIN ASPART 1 UNIT: 100 INJECTION, SOLUTION INTRAVENOUS; SUBCUTANEOUS at 07:35

## 2019-02-10 RX ADMIN — CARBIDOPA AND LEVODOPA SCH TAB: 25; 100 TABLET ORAL at 12:09

## 2019-02-10 RX ADMIN — CARBIDOPA AND LEVODOPA SCH TAB: 25; 100 TABLET ORAL at 20:46

## 2019-02-10 RX ADMIN — LINAGLIPTIN SCH MG: 5 TABLET, FILM COATED ORAL at 07:53

## 2019-02-10 RX ADMIN — SENNOSIDES SCH TAB: 8.6 TABLET, FILM COATED ORAL at 20:42

## 2019-02-10 RX ADMIN — BISACODYL SCH MG: 5 TABLET, COATED ORAL at 09:21

## 2019-02-10 RX ADMIN — DOCUSATE SODIUM SCH MG: 100 CAPSULE, LIQUID FILLED ORAL at 20:42

## 2019-02-10 RX ADMIN — LINAGLIPTIN 1 MG: 5 TABLET, FILM COATED ORAL at 07:53

## 2019-02-10 RX ADMIN — ASPIRIN 1 MG: 81 TABLET, COATED ORAL at 09:21

## 2019-02-10 RX ADMIN — BISACODYL 1 MG: 5 TABLET, COATED ORAL at 09:21

## 2019-02-10 RX ADMIN — ASPIRIN SCH MG: 81 TABLET, COATED ORAL at 09:21

## 2019-02-10 RX ADMIN — INSULIN GLARGINE SCH UNITS: 100 INJECTION, SOLUTION SUBCUTANEOUS at 20:40

## 2019-02-10 RX ADMIN — DOCUSATE SODIUM 1 MG: 100 CAPSULE, LIQUID FILLED ORAL at 20:42

## 2019-02-10 RX ADMIN — INSULIN ASPART 1 UNIT: 100 INJECTION, SOLUTION INTRAVENOUS; SUBCUTANEOUS at 17:30

## 2019-02-10 RX ADMIN — CARBIDOPA AND LEVODOPA 1 TAB: 25; 100 TABLET ORAL at 09:21

## 2019-02-10 RX ADMIN — ATORVASTATIN CALCIUM SCH MG: 40 TABLET, FILM COATED ORAL at 20:42

## 2019-02-10 RX ADMIN — INSULIN ASPART 1 UNIT: 100 INJECTION, SOLUTION INTRAVENOUS; SUBCUTANEOUS at 20:41

## 2019-02-10 RX ADMIN — INSULIN GLARGINE 1 UNITS: 100 INJECTION, SOLUTION SUBCUTANEOUS at 20:40

## 2019-02-10 RX ADMIN — DOCUSATE SODIUM SCH MG: 100 CAPSULE, LIQUID FILLED ORAL at 09:21

## 2019-02-10 RX ADMIN — FAMOTIDINE 1 MG: 20 TABLET ORAL at 09:21

## 2019-02-10 RX ADMIN — FAMOTIDINE SCH MG: 20 TABLET ORAL at 09:21

## 2019-02-10 RX ADMIN — CARBIDOPA AND LEVODOPA 1 TAB: 25; 100 TABLET ORAL at 12:09

## 2019-02-10 RX ADMIN — FAMOTIDINE SCH MG: 20 TABLET ORAL at 20:42

## 2019-02-10 RX ADMIN — SENNOSIDES 1 TAB: 8.6 TABLET, FILM COATED ORAL at 20:42

## 2019-02-10 RX ADMIN — INSULIN ASPART 1 UNIT: 100 INJECTION, SOLUTION INTRAVENOUS; SUBCUTANEOUS at 12:00

## 2019-02-10 NOTE — PN
Date/Time of Note


Date/Time of Note


DATE: 2/10/19 


TIME: 12:47





Assessment/Plan


VTE Prophylaxis


Risk score (from Ns)>0 risk:  4


SCD applied (from [facility]):  Yes


Pharmacological prophylaxis:  LMWH





Lines/Catheters


IV Catheter Type (from [facility]):  Saline Lock


Urinary Cath still in place:  No





Assessment/Plan


Hospital Course


-Acute stroke with right-sided weakness.  Continue aspirin and Lipitor.  


Continue PT and OT.  S/p evaluation by  Dr. Braden  in neurology 


consultation.  


-Encephalopathy secondary to acute stroke


-Diabetes mellitus type 2 with hemoglobin A1c 8.1.  Continue metformin and 


Tradjenta, NovoLog per mild algorithm sliding scale.


-Parkinson's disease, continue Sinemet.


-S/p UTI, completed treatment with Rocephin


Results 24hrs





Laboratory Tests


   Test
            2/9/19
17:29  2/9/19
20:36  2/10/19
07:45  2/10/19
12:02


   Bedside Glucose         151           130             81             94








Subjective


24 Hr Interval Summary


Free Text/Dictation


Patient has no complaints





Exam/Review of Systems


Exam


Vitals





Vital Signs


  Date      Temp  Pulse  Resp  B/P (MAP)   Pulse Ox  O2          O2 Flow    FiO2


Time                                                 Delivery    Rate


   2/10/19  97.6     77    20      134/68        94  Room Air


     08:00                           (90)








Intake and Output





2/9/19


2/9/19


2/10/19





1515:00


23:00


07:00





IntakeIntake Total


780 ml


240 ml





BalanceBalance


780 ml


240 ml











Constitutional:  well developed


Head:  normocephalic, atraumatic


Neck:  supple


Respiratory:  clear to auscultation


Gastrointestinal:  soft, non-tender


Extremities:  normal pulses





Results


Results 24hrs





Laboratory Tests


   Test
            2/9/19
17:29  2/9/19
20:36  2/10/19
07:45  2/10/19
12:02


   Bedside Glucose         151           130             81             94








Medications


Medication





Current Medications


Aspirin (Halfprin) 81 mg DAILY PO  Last administered on 2/10/19at 09:21; Admin 


Dose 81 MG;  Start 2/1/19 at 09:00


Atorvastatin Calcium (Lipitor) 40 mg DAILY@21 PO  Last administered on 2/9/19at 


20:38; Admin Dose 40 MG;  Start 1/31/19 at 22:30


Bisacodyl (Dulcolax) 10 mg DAILY PO  Last administered on 2/10/19at 09:21; Admin


Dose 10 MG;  Start 2/1/19 at 09:00


Clonidine (Catapres) 0.1 mg Q6H  PRN PO ELEVATED BLOOD PRESSURE;  Start 1/31/19 


at 23:00


Miscellaneous Information 1 ea NOTE XX ;  Start 1/31/19 at 23:00


Glucose (Glutose) 15 gm Q15M  PRN PO DECREASED GLUCOSE;  Start 1/31/19 at 23:00


Glucose (Glutose) 22.5 gm Q15M  PRN PO DECREASED GLUCOSE;  Start 1/31/19 at 


23:00


Dextrose (D50w Syringe) 25 ml Q15M  PRN IV DECREASED GLUCOSE;  Start 1/31/19 at 


23:00


Dextrose (D50w Syringe) 50 ml Q15M  PRN IV DECREASED GLUCOSE;  Start 1/31/19 at 


23:00


Glucagon (Glucagen) 1 mg Q15M  PRN IM DECREASED GLUCOSE;  Start 1/31/19 at 23:00


Glucose (Glutose) 15 gm Q15M  PRN BUCCAL DECREASED GLUCOSE;  Start 1/31/19 at 


23:00


Famotidine (Pepcid) 20 mg Q12 PO  Last administered on 2/10/19at 09:21; Admin 


Dose 20 MG;  Start 1/31/19 at 22:30


Insulin Aspart (Novolog Insulin Pen) (Adult SC Insulin - Moder... WITH MEALS  


BEDTIME SC  Last administered on 2/9/19at 17:36; Admin Dose 2 UNIT;  Start 


1/31/19 at 22:30


Diagnostic Test (Pha) (Accu-Chek) 1 ea 02 XX  Last administered on 2/6/19at 


02:37; Admin Dose 1 EA;  Start 2/1/19 at 02:00


Carbidopa/Levodopa (Sinemet (25/ 100)) 1 tab TID PO  Last administered on 


2/10/19at 12:09; Admin Dose 1 TAB;  Start 1/31/19 at 22:30


Linagliptin (Tradjenta) 5 mg DAILY PO  Last administered on 2/10/19at 07:53; 


Admin Dose 5 MG;  Start 2/1/19 at 09:00


Metformin HCl (Glucophage) 500 mg BID WITH  MEALS PO  Last administered on 


2/10/19at 07:53; Admin Dose 500 MG;  Start 2/1/19 at 07:35


Ondansetron HCl (Zofran Inj) 4 mg Q6H  PRN IV NAUSEA AND/OR VOMITING;  Start 


1/31/19 at 23:00


Senna (Senokot) 1 tab HS PO  Last administered on 2/9/19at 20:38; Admin Dose 1 


TAB;  Start 2/1/19 at 21:00


Magnesium Hydroxide (Milk Of Mag) 30 ml BID  PRN PO CONSTIPATION Last 


administered on 2/1/19at 20:48; Admin Dose 30 ML;  Start 2/1/19 at 00:00


Lactulose (Enulose) 20 gm DAILY  PRN PO CONSTIPATION Last administered on 


2/2/19at 08:40; Admin Dose 20 GM;  Start 2/1/19 at 00:00


Acetaminophen (Tylenol Tab) 650 mg Q4H  PRN PO PAIN Last administered on 


2/5/19at 09:46; Admin Dose 650 MG;  Start 2/1/19 at 00:00


Miscellaneous Information (Pending [facility] Order For Wound Care) This patient h


a... PRN  PRN XX WOUND CARE;  Start 2/1/19 at 00:00


Bisacodyl (Dulcolax Supp) 10 mg DAILY  PRN [place] CONSTIPATION Last administered on 


2/3/19at 17:58; Admin Dose 10 MG;  Start 2/2/19 at 10:30


Sodium Biphosphate/ Sodium Phosphate (Fleet Enema) 133 ml DAILY  PRN AR 


CONSTIPATION;  Start 2/2/19 at 10:30


Polyethylene Glycol (Miralax) 17 gm DAILY  PRN PO CONSTIPATION Last administered


on 2/2/19at 12:31; Admin Dose 17 GM;  Start 2/2/19 at 10:30


Docusate Sodium (Colace) 100 mg BID PO  Last administered on 2/10/19at 09:21; 


Admin Dose 100 MG;  Start 2/3/19 at 09:00


Celecoxib (Celebrex) 100 mg DAILY  PRN PO pain Last administered on 2/8/19at 


13:49; Admin Dose 100 MG;  Start 2/5/19 at 11:30


Insulin Glargine (Lantus) 6 units DAILY@2000 SC  Last administered on 2/9/19at 


20:41; Admin Dose 6 UNITS;  Start 2/6/19 at 20:00











DIPTI DOYLE                   Feb 10, 2019 12:47

## 2019-02-11 VITALS — RESPIRATION RATE: 17 BRPM | SYSTOLIC BLOOD PRESSURE: 122 MMHG | DIASTOLIC BLOOD PRESSURE: 62 MMHG

## 2019-02-11 VITALS — DIASTOLIC BLOOD PRESSURE: 62 MMHG | RESPIRATION RATE: 18 BRPM | HEART RATE: 85 BPM | SYSTOLIC BLOOD PRESSURE: 113 MMHG

## 2019-02-11 VITALS — HEART RATE: 89 BPM | RESPIRATION RATE: 19 BRPM | SYSTOLIC BLOOD PRESSURE: 126 MMHG | DIASTOLIC BLOOD PRESSURE: 77 MMHG

## 2019-02-11 VITALS — RESPIRATION RATE: 20 BRPM | DIASTOLIC BLOOD PRESSURE: 84 MMHG | SYSTOLIC BLOOD PRESSURE: 138 MMHG | HEART RATE: 92 BPM

## 2019-02-11 RX ADMIN — DOCUSATE SODIUM SCH MG: 100 CAPSULE, LIQUID FILLED ORAL at 20:47

## 2019-02-11 RX ADMIN — CARBIDOPA AND LEVODOPA 1 TAB: 25; 100 TABLET ORAL at 08:58

## 2019-02-11 RX ADMIN — FAMOTIDINE SCH MG: 20 TABLET ORAL at 20:47

## 2019-02-11 RX ADMIN — CARBIDOPA AND LEVODOPA SCH TAB: 25; 100 TABLET ORAL at 20:47

## 2019-02-11 RX ADMIN — DOCUSATE SODIUM SCH MG: 100 CAPSULE, LIQUID FILLED ORAL at 08:58

## 2019-02-11 RX ADMIN — CARBIDOPA AND LEVODOPA SCH TAB: 25; 100 TABLET ORAL at 08:58

## 2019-02-11 RX ADMIN — LINAGLIPTIN SCH MG: 5 TABLET, FILM COATED ORAL at 08:05

## 2019-02-11 RX ADMIN — CARBIDOPA AND LEVODOPA 1 TAB: 25; 100 TABLET ORAL at 20:47

## 2019-02-11 RX ADMIN — CARBIDOPA AND LEVODOPA SCH TAB: 25; 100 TABLET ORAL at 12:22

## 2019-02-11 RX ADMIN — INSULIN ASPART 1 UNIT: 100 INJECTION, SOLUTION INTRAVENOUS; SUBCUTANEOUS at 21:00

## 2019-02-11 RX ADMIN — INSULIN GLARGINE 1 UNITS: 100 INJECTION, SOLUTION SUBCUTANEOUS at 21:05

## 2019-02-11 RX ADMIN — INSULIN ASPART 1 UNIT: 100 INJECTION, SOLUTION INTRAVENOUS; SUBCUTANEOUS at 17:35

## 2019-02-11 RX ADMIN — ATORVASTATIN CALCIUM 1 MG: 40 TABLET, FILM COATED ORAL at 20:47

## 2019-02-11 RX ADMIN — DOCUSATE SODIUM 1 MG: 100 CAPSULE, LIQUID FILLED ORAL at 20:47

## 2019-02-11 RX ADMIN — SENNOSIDES SCH TAB: 8.6 TABLET, FILM COATED ORAL at 21:00

## 2019-02-11 RX ADMIN — LINAGLIPTIN 1 MG: 5 TABLET, FILM COATED ORAL at 08:05

## 2019-02-11 RX ADMIN — INSULIN ASPART 1 UNIT: 100 INJECTION, SOLUTION INTRAVENOUS; SUBCUTANEOUS at 07:35

## 2019-02-11 RX ADMIN — ATORVASTATIN CALCIUM SCH MG: 40 TABLET, FILM COATED ORAL at 20:47

## 2019-02-11 RX ADMIN — INSULIN ASPART 1 UNIT: 100 INJECTION, SOLUTION INTRAVENOUS; SUBCUTANEOUS at 12:24

## 2019-02-11 RX ADMIN — CARBIDOPA AND LEVODOPA 1 TAB: 25; 100 TABLET ORAL at 12:22

## 2019-02-11 RX ADMIN — DOCUSATE SODIUM 1 MG: 100 CAPSULE, LIQUID FILLED ORAL at 08:58

## 2019-02-11 RX ADMIN — ASPIRIN SCH MG: 81 TABLET, COATED ORAL at 08:58

## 2019-02-11 RX ADMIN — SENNOSIDES 1 TAB: 8.6 TABLET, FILM COATED ORAL at 21:00

## 2019-02-11 RX ADMIN — ASPIRIN 1 MG: 81 TABLET, COATED ORAL at 08:58

## 2019-02-11 RX ADMIN — BISACODYL 1 MG: 5 TABLET, COATED ORAL at 08:58

## 2019-02-11 RX ADMIN — INSULIN GLARGINE SCH UNITS: 100 INJECTION, SOLUTION SUBCUTANEOUS at 21:05

## 2019-02-11 RX ADMIN — FAMOTIDINE 1 MG: 20 TABLET ORAL at 20:47

## 2019-02-11 RX ADMIN — BISACODYL SCH MG: 5 TABLET, COATED ORAL at 08:58

## 2019-02-11 RX ADMIN — CELECOXIB PRN MG: 100 CAPSULE ORAL at 08:57

## 2019-02-11 RX ADMIN — FAMOTIDINE 1 MG: 20 TABLET ORAL at 08:58

## 2019-02-11 RX ADMIN — FAMOTIDINE SCH MG: 20 TABLET ORAL at 08:58

## 2019-02-11 RX ADMIN — CELECOXIB 1 MG: 100 CAPSULE ORAL at 08:57

## 2019-02-11 NOTE — PN
Date/Time of Note


Date/Time of Note


DATE: 2/11/19 


TIME: 12:25





Objective





Vital Signs


  Date      Temp  Pulse  Resp  B/P (MAP)   Pulse Ox  O2          O2 Flow    FiO2


Time                                                 Delivery    Rate


   2/11/19                 17      122/62        95  Room Air


     02:19                           (82)


   2/10/19  97.6     75


     19:42








Intake and Output





2/10/19


2/10/19


2/11/19





1515:00


23:00


07:00





IntakeIntake Total


150 ml


440 ml





BalanceBalance


150 ml


440 ml











Exam


INTERDISCIPLINARY TEAM CONFERENCE





Physical Exam:


Pulm-cta


Abd-abd








BOWEL- incont


BLADDER-cont/incont 


SKIN- 











OT-     DRESSING- mod/max


   BATHING-mod/max


   TOILETING-max





PT-      BED MOBILITY-mod


   TRANSFERS-max


   WHEELCHAIR MOBILITY- MAX





SPEECH- mod cognition


          


A/P-


Interdisciplinary team conference held today. Please see interdisciplinary 


sheet.  Working toward d.c. on 2/20 with post discharge follow up of physical 


therapy, occupational therapy, home health nursing. Discharge issues reviewed 


with patient's wife. She reports she will need him at a higher functional level 


in order to return home. Given that he has made some functional progress, we 


will work towards d/c 2/20, and continue to monitor his progress, and update 


her.





Results/Medications


Results 24 hrs





Laboratory Tests


          Test
            2/10/19
17:25  2/10/19
20:37  2/11/19
07:58


          Bedside Glucose          141            113             98





Medications





Current Medications


Aspirin (Halfprin) 81 mg DAILY PO  Last administered on 2/11/19at 08:58; Admin 


Dose 81 MG;  Start 2/1/19 at 09:00


Atorvastatin Calcium (Lipitor) 40 mg DAILY@21 PO  Last administered on 2/10/19at


20:42; Admin Dose 40 MG;  Start 1/31/19 at 22:30


Bisacodyl (Dulcolax) 10 mg DAILY PO  Last administered on 2/11/19at 08:58; Admin


Dose 10 MG;  Start 2/1/19 at 09:00


Clonidine (Catapres) 0.1 mg Q6H  PRN PO ELEVATED BLOOD PRESSURE;  Start 1/31/19 


at 23:00


Miscellaneous Information 1 ea NOTE XX ;  Start 1/31/19 at 23:00


Glucose (Glutose) 15 gm Q15M  PRN PO DECREASED GLUCOSE;  Start 1/31/19 at 23:00


Glucose (Glutose) 22.5 gm Q15M  PRN PO DECREASED GLUCOSE;  Start 1/31/19 at 


23:00


Dextrose (D50w Syringe) 25 ml Q15M  PRN IV DECREASED GLUCOSE;  Start 1/31/19 at 


23:00


Dextrose (D50w Syringe) 50 ml Q15M  PRN IV DECREASED GLUCOSE;  Start 1/31/19 at 


23:00


Glucagon (Glucagen) 1 mg Q15M  PRN IM DECREASED GLUCOSE;  Start 1/31/19 at 23:00


Glucose (Glutose) 15 gm Q15M  PRN BUCCAL DECREASED GLUCOSE;  Start 1/31/19 at 


23:00


Famotidine (Pepcid) 20 mg Q12 PO  Last administered on 2/11/19at 08:58; Admin 


Dose 20 MG;  Start 1/31/19 at 22:30


Insulin Aspart (Novolog Insulin Pen) (Adult SC Insulin - Moder... WITH MEALS  


BEDTIME SC  Last administered on 2/11/19at 12:24; Admin Dose 2 UNIT;  Start 


1/31/19 at 22:30


Diagnostic Test (Pha) (Accu-Chek) 1 ea 02 XX  Last administered on 2/6/19at 


02:37; Admin Dose 1 EA;  Start 2/1/19 at 02:00


Carbidopa/Levodopa (Sinemet (25/ 100)) 1 tab TID PO  Last administered on 


2/11/19at 12:22; Admin Dose 1 TAB;  Start 1/31/19 at 22:30


Linagliptin (Tradjenta) 5 mg DAILY PO  Last administered on 2/11/19at 08:05; 


Admin Dose 5 MG;  Start 2/1/19 at 09:00


Metformin HCl (Glucophage) 500 mg BID WITH  MEALS PO  Last administered on 


2/11/19at 08:05; Admin Dose 500 MG;  Start 2/1/19 at 07:35


Ondansetron HCl (Zofran Inj) 4 mg Q6H  PRN IV NAUSEA AND/OR VOMITING;  Start 


1/31/19 at 23:00


Senna (Senokot) 1 tab HS PO  Last administered on 2/10/19at 20:42; Admin Dose 1 


TAB;  Start 2/1/19 at 21:00


Magnesium Hydroxide (Milk Of Mag) 30 ml BID  PRN PO CONSTIPATION Last 


administered on 2/1/19at 20:48; Admin Dose 30 ML;  Start 2/1/19 at 00:00


Lactulose (Enulose) 20 gm DAILY  PRN PO CONSTIPATION Last administered on 


2/2/19at 08:40; Admin Dose 20 GM;  Start 2/1/19 at 00:00


Acetaminophen (Tylenol Tab) 650 mg Q4H  PRN PO PAIN Last administered on 


2/5/19at 09:46; Admin Dose 650 MG;  Start 2/1/19 at 00:00


Miscellaneous Information (Pending Santyl Order For Wound Care) This patient 


ha... PRN  PRN XX WOUND CARE;  Start 2/1/19 at 00:00


Bisacodyl (Dulcolax Supp) 10 mg DAILY  PRN SC CONSTIPATION Last administered on 


2/3/19at 17:58; Admin Dose 10 MG;  Start 2/2/19 at 10:30


Sodium Biphosphate/ Sodium Phosphate (Fleet Enema) 133 ml DAILY  PRN SC 


CONSTIPATION;  Start 2/2/19 at 10:30


Polyethylene Glycol (Miralax) 17 gm DAILY  PRN PO CONSTIPATION Last administered


on 2/2/19at 12:31; Admin Dose 17 GM;  Start 2/2/19 at 10:30


Docusate Sodium (Colace) 100 mg BID PO  Last administered on 2/11/19at 08:58; 


Admin Dose 100 MG;  Start 2/3/19 at 09:00


Celecoxib (Celebrex) 100 mg DAILY  PRN PO pain Last administered on 2/11/19at 


08:57; Admin Dose 100 MG;  Start 2/5/19 at 11:30


Insulin Glargine (Lantus) 6 units DAILY@2000 SC  Last administered on 2/10/19at 


20:40; Admin Dose 6 UNITS;  Start 2/6/19 at 20:00











MARS POMPA MD             Feb 11, 2019 12:29

## 2019-02-11 NOTE — PN
Date/Time of Note


Date/Time of Note


DATE: 2/11/19 


TIME: 15:57





Assessment/Plan


VTE Prophylaxis


Risk score (from Ns)>0 risk:  4


SCD applied (from [facility]):  Yes


Pharmacological prophylaxis:  NA/contraindicated


Pharm contraindication:  other





Lines/Catheters


IV Catheter Type (from [facility]):  Saline Lock


Urinary Cath still in place:  No





Assessment/Plan


Hospital Course


No acute events overnight patient continues to work on transfer from chair bed 


to chair with physical therapy, corporative.  Blood sugar is adequately 


controlled.


Assessment/Plan


-Acute stroke with right-sided weakness.  Continue aspirin and Lipitor.  


Continue PT and OT.  S/p evaluation by  Dr. Braden  in neurology 


consultation.  


-Encephalopathy secondary to acute stroke


-Diabetes mellitus type 2 with hemoglobin A1c 8.1.  Continue metformin and 


Tradjenta, NovoLog per mild algorithm sliding scale.


-Parkinson's disease, continue Sinemet.


-S/p UTI, completed treatment with Rocephin





Further recommendations based on clinical course.  Plan of care discussed with 


Dr. Win.


Results 24hrs





Laboratory Tests


  Test
            2/10/19
17:25  2/10/19
20:37  2/11/19
07:58  2/11/19
12:18


  Bedside Glucose          141            113             98            142








Exam/Review of Systems


Exam


Vitals





Vital Signs


  Date      Temp  Pulse  Resp  B/P (MAP)   Pulse Ox  O2          O2 Flow    FiO2


Time                                                 Delivery    Rate


   2/11/19                 17      122/62        95  Room Air


     02:19                           (82)


   2/10/19  97.6     75


     19:42








Intake and Output





2/10/19


2/10/19


2/11/19





1515:00


23:00


07:00





IntakeIntake Total


150 ml


440 ml





BalanceBalance


150 ml


440 ml











Exam


Constitutional:  alert, oriented


Respiratory:  clear to auscultation


Cardiovascular:  nl pulse


Gastrointestinal:  soft, non-tender


Musculoskeletal:  nl extremities to inspection


Extremities:  normal pulses


Neurological:  other (R sided weakness)





Results


Results 24hrs





Laboratory Tests


  Test
            2/10/19
17:25  2/10/19
20:37  2/11/19
07:58  2/11/19
12:18


  Bedside Glucose          141            113             98            142








Medications


Medication





Current Medications


Aspirin (Halfprin) 81 mg DAILY PO  Last administered on 2/11/19at 08:58; Admin 


Dose 81 MG;  Start 2/1/19 at 09:00


Atorvastatin Calcium (Lipitor) 40 mg DAILY@21 PO  Last administered on 2/10/19at


20:42; Admin Dose 40 MG;  Start 1/31/19 at 22:30


Bisacodyl (Dulcolax) 10 mg DAILY PO  Last administered on 2/11/19at 08:58; Admin


Dose 10 MG;  Start 2/1/19 at 09:00


Clonidine (Catapres) 0.1 mg Q6H  PRN PO ELEVATED BLOOD PRESSURE;  Start 1/31/19 


at 23:00


Miscellaneous Information 1 ea NOTE XX ;  Start 1/31/19 at 23:00


Glucose (Glutose) 15 gm Q15M  PRN PO DECREASED GLUCOSE;  Start 1/31/19 at 23:00


Glucose (Glutose) 22.5 gm Q15M  PRN PO DECREASED GLUCOSE;  Start 1/31/19 at 


23:00


Dextrose (D50w Syringe) 25 ml Q15M  PRN IV DECREASED GLUCOSE;  Start 1/31/19 at 


23:00


Dextrose (D50w Syringe) 50 ml Q15M  PRN IV DECREASED GLUCOSE;  Start 1/31/19 at 


23:00


Glucagon (Glucagen) 1 mg Q15M  PRN IM DECREASED GLUCOSE;  Start 1/31/19 at 23:00


Glucose (Glutose) 15 gm Q15M  PRN BUCCAL DECREASED GLUCOSE;  Start 1/31/19 at 


23:00


Famotidine (Pepcid) 20 mg Q12 PO  Last administered on 2/11/19at 08:58; Admin 


Dose 20 MG;  Start 1/31/19 at 22:30


Insulin Aspart (Novolog Insulin Pen) (Adult SC Insulin - Moder... WITH MEALS  


BEDTIME SC  Last administered on 2/11/19at 12:24; Admin Dose 2 UNIT;  Start 


1/31/19 at 22:30


Diagnostic Test (Pha) (Accu-Chek) 1 ea 02 XX  Last administered on 2/6/19at 


02:37; Admin Dose 1 EA;  Start 2/1/19 at 02:00


Carbidopa/Levodopa (Sinemet (25/ 100)) 1 tab TID PO  Last administered on 


2/11/19at 12:22; Admin Dose 1 TAB;  Start 1/31/19 at 22:30


Linagliptin (Tradjenta) 5 mg DAILY PO  Last administered on 2/11/19at 08:05; 


Admin Dose 5 MG;  Start 2/1/19 at 09:00


Metformin HCl (Glucophage) 500 mg BID WITH  MEALS PO  Last administered on 


2/11/19at 08:05; Admin Dose 500 MG;  Start 2/1/19 at 07:35


Ondansetron HCl (Zofran Inj) 4 mg Q6H  PRN IV NAUSEA AND/OR VOMITING;  Start 


1/31/19 at 23:00


Senna (Senokot) 1 tab HS PO  Last administered on 2/10/19at 20:42; Admin Dose 1 


TAB;  Start 2/1/19 at 21:00


Magnesium Hydroxide (Milk Of Mag) 30 ml BID  PRN PO CONSTIPATION Last 


administered on 2/1/19at 20:48; Admin Dose 30 ML;  Start 2/1/19 at 00:00


Lactulose (Enulose) 20 gm DAILY  PRN PO CONSTIPATION Last administered on 


2/2/19at 08:40; Admin Dose 20 GM;  Start 2/1/19 at 00:00


Acetaminophen (Tylenol Tab) 650 mg Q4H  PRN PO PAIN Last administered on 


2/5/19at 09:46; Admin Dose 650 MG;  Start 2/1/19 at 00:00


Miscellaneous Information (Pending [facility] Order For Wound Care) This patient 


ha... PRN  PRN XX WOUND CARE;  Start 2/1/19 at 00:00


Bisacodyl (Dulcolax Supp) 10 mg DAILY  PRN [place] CONSTIPATION Last administered on 


2/3/19at 17:58; Admin Dose 10 MG;  Start 2/2/19 at 10:30


Sodium Biphosphate/ Sodium Phosphate (Fleet Enema) 133 ml DAILY  PRN [place] 


CONSTIPATION;  Start 2/2/19 at 10:30


Polyethylene Glycol (Miralax) 17 gm DAILY  PRN PO CONSTIPATION Last administered


on 2/2/19at 12:31; Admin Dose 17 GM;  Start 2/2/19 at 10:30


Docusate Sodium (Colace) 100 mg BID PO  Last administered on 2/11/19at 08:58; 


Admin Dose 100 MG;  Start 2/3/19 at 09:00


Celecoxib (Celebrex) 100 mg DAILY  PRN PO pain Last administered on 2/11/19at 


08:57; Admin Dose 100 MG;  Start 2/5/19 at 11:30


Insulin Glargine (Lantus) 6 units DAILY@2000 SC  Last administered on 2/10/19at 


20:40; Admin Dose 6 UNITS;  Start 2/6/19 at 20:00











LUCY CHEN             Feb 11, 2019 15:58

## 2019-02-12 VITALS — RESPIRATION RATE: 18 BRPM | DIASTOLIC BLOOD PRESSURE: 61 MMHG | HEART RATE: 73 BPM | SYSTOLIC BLOOD PRESSURE: 134 MMHG

## 2019-02-12 VITALS — HEART RATE: 77 BPM | DIASTOLIC BLOOD PRESSURE: 65 MMHG | RESPIRATION RATE: 18 BRPM | SYSTOLIC BLOOD PRESSURE: 111 MMHG

## 2019-02-12 VITALS — RESPIRATION RATE: 18 BRPM | HEART RATE: 71 BPM | SYSTOLIC BLOOD PRESSURE: 131 MMHG | DIASTOLIC BLOOD PRESSURE: 70 MMHG

## 2019-02-12 VITALS — SYSTOLIC BLOOD PRESSURE: 101 MMHG | RESPIRATION RATE: 18 BRPM | HEART RATE: 82 BPM | DIASTOLIC BLOOD PRESSURE: 58 MMHG

## 2019-02-12 RX ADMIN — INSULIN ASPART 1 UNIT: 100 INJECTION, SOLUTION INTRAVENOUS; SUBCUTANEOUS at 07:35

## 2019-02-12 RX ADMIN — INSULIN GLARGINE SCH UNITS: 100 INJECTION, SOLUTION SUBCUTANEOUS at 20:22

## 2019-02-12 RX ADMIN — SENNOSIDES 1 TAB: 8.6 TABLET, FILM COATED ORAL at 20:29

## 2019-02-12 RX ADMIN — FAMOTIDINE 1 MG: 20 TABLET ORAL at 20:12

## 2019-02-12 RX ADMIN — DOCUSATE SODIUM SCH MG: 100 CAPSULE, LIQUID FILLED ORAL at 20:29

## 2019-02-12 RX ADMIN — SENNOSIDES SCH TAB: 8.6 TABLET, FILM COATED ORAL at 20:29

## 2019-02-12 RX ADMIN — DOCUSATE SODIUM 1 MG: 100 CAPSULE, LIQUID FILLED ORAL at 08:10

## 2019-02-12 RX ADMIN — BISACODYL 1 MG: 5 TABLET, COATED ORAL at 08:10

## 2019-02-12 RX ADMIN — BISACODYL SCH MG: 5 TABLET, COATED ORAL at 08:10

## 2019-02-12 RX ADMIN — DOCUSATE SODIUM SCH MG: 100 CAPSULE, LIQUID FILLED ORAL at 08:10

## 2019-02-12 RX ADMIN — ASPIRIN 1 MG: 81 TABLET, COATED ORAL at 08:09

## 2019-02-12 RX ADMIN — CARBIDOPA AND LEVODOPA 1 TAB: 25; 100 TABLET ORAL at 08:08

## 2019-02-12 RX ADMIN — CARBIDOPA AND LEVODOPA SCH TAB: 25; 100 TABLET ORAL at 12:00

## 2019-02-12 RX ADMIN — CARBIDOPA AND LEVODOPA 1 TAB: 25; 100 TABLET ORAL at 12:00

## 2019-02-12 RX ADMIN — ATORVASTATIN CALCIUM 1 MG: 40 TABLET, FILM COATED ORAL at 20:12

## 2019-02-12 RX ADMIN — ATORVASTATIN CALCIUM SCH MG: 40 TABLET, FILM COATED ORAL at 20:12

## 2019-02-12 RX ADMIN — INSULIN ASPART 1 UNIT: 100 INJECTION, SOLUTION INTRAVENOUS; SUBCUTANEOUS at 11:57

## 2019-02-12 RX ADMIN — INSULIN ASPART 1 UNIT: 100 INJECTION, SOLUTION INTRAVENOUS; SUBCUTANEOUS at 17:35

## 2019-02-12 RX ADMIN — INSULIN GLARGINE 1 UNITS: 100 INJECTION, SOLUTION SUBCUTANEOUS at 20:22

## 2019-02-12 RX ADMIN — CARBIDOPA AND LEVODOPA 1 TAB: 25; 100 TABLET ORAL at 20:12

## 2019-02-12 RX ADMIN — INSULIN ASPART 1 UNIT: 100 INJECTION, SOLUTION INTRAVENOUS; SUBCUTANEOUS at 20:29

## 2019-02-12 RX ADMIN — FAMOTIDINE SCH MG: 20 TABLET ORAL at 08:12

## 2019-02-12 RX ADMIN — LINAGLIPTIN 1 MG: 5 TABLET, FILM COATED ORAL at 08:10

## 2019-02-12 RX ADMIN — ASPIRIN SCH MG: 81 TABLET, COATED ORAL at 08:09

## 2019-02-12 RX ADMIN — CARBIDOPA AND LEVODOPA SCH TAB: 25; 100 TABLET ORAL at 08:08

## 2019-02-12 RX ADMIN — LINAGLIPTIN SCH MG: 5 TABLET, FILM COATED ORAL at 08:10

## 2019-02-12 RX ADMIN — CARBIDOPA AND LEVODOPA SCH TAB: 25; 100 TABLET ORAL at 20:12

## 2019-02-12 RX ADMIN — FAMOTIDINE SCH MG: 20 TABLET ORAL at 20:12

## 2019-02-12 RX ADMIN — FAMOTIDINE 1 MG: 20 TABLET ORAL at 08:12

## 2019-02-12 RX ADMIN — DOCUSATE SODIUM 1 MG: 100 CAPSULE, LIQUID FILLED ORAL at 20:29

## 2019-02-12 NOTE — PN
Date/Time of Note


Date/Time of Note


DATE: 2/12/19 


TIME: 10:34





Assessment/Plan


VTE Prophylaxis


Risk score (from Ns)>0 risk:  4


[place] [facility] [place]


SCD contraindicated:  other


Pharmacological prophylaxis:  NA/contraindicated


Pharm contraindication:  other





Lines/Catheters


IV Catheter Type (from [facility]):  Saline Lock


Urinary Cath still in place:  No





Assessment/Plan


Hospital Course


Patient is awake alert able to follow commands participate in PT however still 


requires tube people support for transfers.


Assessment/Plan


-Acute stroke with right-sided weakness.  Continue aspirin and Lipitor.  


Continue PT and OT.  S/p evaluation by  Dr. Braden  in neurology 


consultation.  


-Encephalopathy secondary to acute stroke


-Diabetes mellitus type 2 with hemoglobin A1c 8.1.  Continue metformin and 


Tradjenta, NovoLog per mild algorithm sliding scale.


-Parkinson's disease, continue Sinemet.


-S/p UTI, completed treatment with Rocephin





Further recommendations based on clinical course.  Plan of care discussed with 


Dr. Win.


Results 24hrs





Laboratory Tests


  Test
            2/11/19
12:18  2/11/19
17:33  2/11/19
20:46  2/12/19
08:06


  Bedside Glucose          142            122            124            114








Exam/Review of Systems


Exam


Vitals





Vital Signs


  Date      Temp  Pulse  Resp  B/P (MAP)   Pulse Ox  O2          O2 Flow    FiO2


Time                                                 Delivery    Rate


   2/12/19  98.2     71    18      131/70        98  Room Air


     07:00                           (90)








Intake and Output





2/11/19 2/11/19 2/12/19





1515:00


23:00


07:00





IntakeIntake Total


150 ml


200 ml





BalanceBalance


150 ml


200 ml











Exam


Constitutional:  alert, oriented


Respiratory:  clear to auscultation


Cardiovascular:  nl pulse


Gastrointestinal:  soft, non-tender


Musculoskeletal:  nl extremities to inspection


Extremities:  normal pulses


Neurological:  other (R sided weakness)





Results


Results 24hrs





Laboratory Tests


  Test
            2/11/19
12:18  2/11/19
17:33  2/11/19
20:46  2/12/19
08:06


  Bedside Glucose          142            122            124            114








Medications


Medication





Current Medications


Aspirin (Halfprin) 81 mg DAILY PO  Last administered on 2/12/19at 08:09; Admin 


Dose 81 MG;  Start 2/1/19 at 09:00


Atorvastatin Calcium (Lipitor) 40 mg DAILY@21 PO  Last administered on 2/11/19at


20:47; Admin Dose 40 MG;  Start 1/31/19 at 22:30


Bisacodyl (Dulcolax) 10 mg DAILY PO  Last administered on 2/11/19at 08:58; Admin


Dose 10 MG;  Start 2/1/19 at 09:00


Clonidine (Catapres) 0.1 mg Q6H  PRN PO ELEVATED BLOOD PRESSURE;  Start 1/31/19 


at 23:00


Miscellaneous Information 1 ea NOTE XX ;  Start 1/31/19 at 23:00


Glucose (Glutose) 15 gm Q15M  PRN PO DECREASED GLUCOSE;  Start 1/31/19 at 23:00


Glucose (Glutose) 22.5 gm Q15M  PRN PO DECREASED GLUCOSE;  Start 1/31/19 at 


23:00


Dextrose (D50w Syringe) 25 ml Q15M  PRN IV DECREASED GLUCOSE;  Start 1/31/19 at 


23:00


Dextrose (D50w Syringe) 50 ml Q15M  PRN IV DECREASED GLUCOSE;  Start 1/31/19 at 


23:00


Glucagon (Glucagen) 1 mg Q15M  PRN IM DECREASED GLUCOSE;  Start 1/31/19 at 23:00


Glucose (Glutose) 15 gm Q15M  PRN BUCCAL DECREASED GLUCOSE;  Start 1/31/19 at 


23:00


Famotidine (Pepcid) 20 mg Q12 PO  Last administered on 2/12/19at 08:12; Admin 


Dose 20 MG;  Start 1/31/19 at 22:30


Insulin Aspart (Novolog Insulin Pen) (Adult SC Insulin - Moder... WITH MEALS  


BEDTIME SC  Last administered on 2/11/19at 12:24; Admin Dose 2 UNIT;  Start 


1/31/19 at 22:30


Diagnostic Test (Pha) (Accu-Chek) 1 ea 02 XX  Last administered on 2/6/19at 0


2:37; Admin Dose 1 EA;  Start 2/1/19 at 02:00


Carbidopa/Levodopa (Sinemet (25/ 100)) 1 tab TID PO  Last administered on 


2/12/19at 08:08; Admin Dose 1 TAB;  Start 1/31/19 at 22:30


Linagliptin (Tradjenta) 5 mg DAILY PO  Last administered on 2/12/19at 08:10; 


Admin Dose 5 MG;  Start 2/1/19 at 09:00


Metformin HCl (Glucophage) 500 mg BID WITH  MEALS PO  Last administered on 


2/12/19at 08:08; Admin Dose 500 MG;  Start 2/1/19 at 07:35


Ondansetron HCl (Zofran Inj) 4 mg Q6H  PRN IV NAUSEA AND/OR VOMITING;  Start 


1/31/19 at 23:00


Senna (Senokot) 1 tab HS PO  Last administered on 2/10/19at 20:42; Admin Dose 1 


TAB;  Start 2/1/19 at 21:00


Magnesium Hydroxide (Milk Of Mag) 30 ml BID  PRN PO CONSTIPATION Last 


administered on 2/1/19at 20:48; Admin Dose 30 ML;  Start 2/1/19 at 00:00


Lactulose (Enulose) 20 gm DAILY  PRN PO CONSTIPATION Last administered on 


2/2/19at 08:40; Admin Dose 20 GM;  Start 2/1/19 at 00:00


Acetaminophen (Tylenol Tab) 650 mg Q4H  PRN PO PAIN Last administered on 


2/5/19at 09:46; Admin Dose 650 MG;  Start 2/1/19 at 00:00


Miscellaneous Information (Pending Santyl Order For Wound Care) This patient 


ha... PRN  PRN XX WOUND CARE;  Start 2/1/19 at 00:00


Bisacodyl (Dulcolax Supp) 10 mg DAILY  PRN [place] CONSTIPATION Last administered on 


2/3/19at 17:58; Admin Dose 10 MG;  Start 2/2/19 at 10:30


Sodium Biphosphate/ Sodium Phosphate (Fleet Enema) 133 ml DAILY  PRN [place] 


CONSTIPATION;  Start 2/2/19 at 10:30


Polyethylene Glycol (Miralax) 17 gm DAILY  PRN PO CONSTIPATION Last administered


on 2/2/19at 12:31; Admin Dose 17 GM;  Start 2/2/19 at 10:30


Docusate Sodium (Colace) 100 mg BID PO  Last administered on 2/11/19at 20:47; 


Admin Dose 100 MG;  Start 2/3/19 at 09:00


Celecoxib (Celebrex) 100 mg DAILY  PRN PO pain Last administered on 2/11/19at 


08:57; Admin Dose 100 MG;  Start 2/5/19 at 11:30


Insulin Glargine (Lantus) 6 units DAILY@2000 SC  Last administered on 2/11/19at 


21:05; Admin Dose 6 UNITS;  Start 2/6/19 at 20:00











LUCY CHEN             Feb 12, 2019 10:34

## 2019-02-12 NOTE — PN
Date/Time of Note


Date/Time of Note


DATE: 2/12/19 


TIME: 14:01





Subjective


patient making  great gains





Objective





Vital Signs


  Date      Temp  Pulse  Resp  B/P (MAP)   Pulse Ox  O2          O2 Flow    FiO2


Time                                                 Delivery    Rate


   2/12/19  98.2     71    18      131/70        98  Room Air


     07:00                           (90)








Intake and Output





2/11/19 2/11/19 2/12/19





1515:00


23:00


07:00





IntakeIntake Total


150 ml


200 ml





BalanceBalance


150 ml


200 ml











Exam


pulm-cta


mod transfer


mod ambulation 5 feet





Results/Medications


Results 24 hrs





Laboratory Tests


  Test
            2/11/19
17:33  2/11/19
20:46  2/12/19
08:06  2/12/19
11:56


  Bedside Glucose          122            124            114            151





Medications





Current Medications


Aspirin (Halfprin) 81 mg DAILY PO  Last administered on 2/12/19at 08:09; Admin 


Dose 81 MG;  Start 2/1/19 at 09:00


Atorvastatin Calcium (Lipitor) 40 mg DAILY@21 PO  Last administered on 2/11/19at


20:47; Admin Dose 40 MG;  Start 1/31/19 at 22:30


Bisacodyl (Dulcolax) 10 mg DAILY PO  Last administered on 2/11/19at 08:58; Admin


Dose 10 MG;  Start 2/1/19 at 09:00


Clonidine (Catapres) 0.1 mg Q6H  PRN PO ELEVATED BLOOD PRESSURE;  Start 1/31/19 


at 23:00


Miscellaneous Information 1 ea NOTE XX ;  Start 1/31/19 at 23:00


Glucose (Glutose) 15 gm Q15M  PRN PO DECREASED GLUCOSE;  Start 1/31/19 at 23:00


Glucose (Glutose) 22.5 gm Q15M  PRN PO DECREASED GLUCOSE;  Start 1/31/19 at 


23:00


Dextrose (D50w Syringe) 25 ml Q15M  PRN IV DECREASED GLUCOSE;  Start 1/31/19 at 


23:00


Dextrose (D50w Syringe) 50 ml Q15M  PRN IV DECREASED GLUCOSE;  Start 1/31/19 at 


23:00


Glucagon (Glucagen) 1 mg Q15M  PRN IM DECREASED GLUCOSE;  Start 1/31/19 at 23:00


Glucose (Glutose) 15 gm Q15M  PRN BUCCAL DECREASED GLUCOSE;  Start 1/31/19 at 


23:00


Famotidine (Pepcid) 20 mg Q12 PO  Last administered on 2/12/19at 08:12; Admin 


Dose 20 MG;  Start 1/31/19 at 22:30


Insulin Aspart (Novolog Insulin Pen) (Adult SC Insulin - Moder... WITH MEALS  


BEDTIME SC  Last administered on 2/12/19at 11:57; Admin Dose 2 UNIT;  Start 


1/31/19 at 22:30


Diagnostic Test (Pha) (Accu-Chek) 1 ea 02 XX  Last administered on 2/6/19at 


02:37; Admin Dose 1 EA;  Start 2/1/19 at 02:00


Carbidopa/Levodopa (Sinemet (25/ 100)) 1 tab TID PO  Last administered on 


2/12/19at 12:00; Admin Dose 1 TAB;  Start 1/31/19 at 22:30


Linagliptin (Tradjenta) 5 mg DAILY PO  Last administered on 2/12/19at 08:10; 


Admin Dose 5 MG;  Start 2/1/19 at 09:00


Metformin HCl (Glucophage) 500 mg BID WITH  MEALS PO  Last administered on 


2/12/19at 08:08; Admin Dose 500 MG;  Start 2/1/19 at 07:35


Ondansetron HCl (Zofran Inj) 4 mg Q6H  PRN IV NAUSEA AND/OR VOMITING;  Start 


1/31/19 at 23:00


Senna (Senokot) 1 tab HS PO  Last administered on 2/10/19at 20:42; Admin Dose 1 


TAB;  Start 2/1/19 at 21:00


Magnesium Hydroxide (Milk Of Mag) 30 ml BID  PRN PO CONSTIPATION Last 


administered on 2/1/19at 20:48; Admin Dose 30 ML;  Start 2/1/19 at 00:00


Lactulose (Enulose) 20 gm DAILY  PRN PO CONSTIPATION Last administered on 


2/2/19at 08:40; Admin Dose 20 GM;  Start 2/1/19 at 00:00


Acetaminophen (Tylenol Tab) 650 mg Q4H  PRN PO PAIN Last administered on 


2/5/19at 09:46; Admin Dose 650 MG;  Start 2/1/19 at 00:00


Miscellaneous Information (Pending Santyl Order For Wound Care) This patient 


ha... PRN  PRN XX WOUND CARE;  Start 2/1/19 at 00:00


Bisacodyl (Dulcolax Supp) 10 mg DAILY  PRN [place] CONSTIPATION Last administered on 


2/3/19at 17:58; Admin Dose 10 MG;  Start 2/2/19 at 10:30


Sodium Biphosphate/ Sodium Phosphate (Fleet Enema) 133 ml DAILY  PRN ID 


CONSTIPATION;  Start 2/2/19 at 10:30


Polyethylene Glycol (Miralax) 17 gm DAILY  PRN PO CONSTIPATION Last administered


on 2/2/19at 12:31; Admin Dose 17 GM;  Start 2/2/19 at 10:30


Docusate Sodium (Colace) 100 mg BID PO  Last administered on 2/11/19at 20:47; 


Admin Dose 100 MG;  Start 2/3/19 at 09:00


Celecoxib (Celebrex) 100 mg DAILY  PRN PO pain Last administered on 2/11/19at 


08:57; Admin Dose 100 MG;  Start 2/5/19 at 11:30


Insulin Glargine (Lantus) 6 units DAILY@2000 SC  Last administered on 2/11/19at 


21:05; Admin Dose 6 UNITS;  Start 2/6/19 at 20:00





Assessment/Plan


Additional Assessment/Plan


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


sided weakness; Parkinsons


   Continue rehab activities, progressing.... started walking


Diabetes mellitus.


Bilateral common carotid artery stenosis.


Urinary tract infection.


Dysphagia











MARS POMPA MD             Feb 12, 2019 14:02

## 2019-02-13 VITALS — HEART RATE: 72 BPM | SYSTOLIC BLOOD PRESSURE: 118 MMHG | DIASTOLIC BLOOD PRESSURE: 60 MMHG | RESPIRATION RATE: 18 BRPM

## 2019-02-13 VITALS — DIASTOLIC BLOOD PRESSURE: 60 MMHG | SYSTOLIC BLOOD PRESSURE: 126 MMHG | RESPIRATION RATE: 18 BRPM | HEART RATE: 68 BPM

## 2019-02-13 VITALS — RESPIRATION RATE: 18 BRPM | SYSTOLIC BLOOD PRESSURE: 122 MMHG | DIASTOLIC BLOOD PRESSURE: 65 MMHG | HEART RATE: 77 BPM

## 2019-02-13 VITALS — HEART RATE: 81 BPM | DIASTOLIC BLOOD PRESSURE: 62 MMHG | SYSTOLIC BLOOD PRESSURE: 118 MMHG | RESPIRATION RATE: 20 BRPM

## 2019-02-13 RX ADMIN — INSULIN GLARGINE 1 UNITS: 100 INJECTION, SOLUTION SUBCUTANEOUS at 20:29

## 2019-02-13 RX ADMIN — DOCUSATE SODIUM SCH MG: 100 CAPSULE, LIQUID FILLED ORAL at 08:22

## 2019-02-13 RX ADMIN — CARBIDOPA AND LEVODOPA SCH TAB: 25; 100 TABLET ORAL at 20:25

## 2019-02-13 RX ADMIN — INSULIN ASPART 1 UNIT: 100 INJECTION, SOLUTION INTRAVENOUS; SUBCUTANEOUS at 07:35

## 2019-02-13 RX ADMIN — INSULIN ASPART 1 UNIT: 100 INJECTION, SOLUTION INTRAVENOUS; SUBCUTANEOUS at 17:39

## 2019-02-13 RX ADMIN — ATORVASTATIN CALCIUM SCH MG: 40 TABLET, FILM COATED ORAL at 20:25

## 2019-02-13 RX ADMIN — ASPIRIN 1 MG: 81 TABLET, COATED ORAL at 08:14

## 2019-02-13 RX ADMIN — FAMOTIDINE SCH MG: 20 TABLET ORAL at 08:14

## 2019-02-13 RX ADMIN — ASPIRIN SCH MG: 81 TABLET, COATED ORAL at 08:14

## 2019-02-13 RX ADMIN — CARBIDOPA AND LEVODOPA 1 TAB: 25; 100 TABLET ORAL at 20:25

## 2019-02-13 RX ADMIN — CARBIDOPA AND LEVODOPA 1 TAB: 25; 100 TABLET ORAL at 12:14

## 2019-02-13 RX ADMIN — SENNOSIDES SCH TAB: 8.6 TABLET, FILM COATED ORAL at 20:25

## 2019-02-13 RX ADMIN — SENNOSIDES 1 TAB: 8.6 TABLET, FILM COATED ORAL at 20:25

## 2019-02-13 RX ADMIN — INSULIN ASPART 1 UNIT: 100 INJECTION, SOLUTION INTRAVENOUS; SUBCUTANEOUS at 20:30

## 2019-02-13 RX ADMIN — CARBIDOPA AND LEVODOPA SCH TAB: 25; 100 TABLET ORAL at 12:14

## 2019-02-13 RX ADMIN — DOCUSATE SODIUM SCH MG: 100 CAPSULE, LIQUID FILLED ORAL at 20:24

## 2019-02-13 RX ADMIN — CELECOXIB 1 MG: 100 CAPSULE ORAL at 12:14

## 2019-02-13 RX ADMIN — FAMOTIDINE SCH MG: 20 TABLET ORAL at 20:25

## 2019-02-13 RX ADMIN — CARBIDOPA AND LEVODOPA SCH TAB: 25; 100 TABLET ORAL at 08:15

## 2019-02-13 RX ADMIN — CELECOXIB PRN MG: 100 CAPSULE ORAL at 12:14

## 2019-02-13 RX ADMIN — FAMOTIDINE 1 MG: 20 TABLET ORAL at 20:25

## 2019-02-13 RX ADMIN — CARBIDOPA AND LEVODOPA 1 TAB: 25; 100 TABLET ORAL at 08:15

## 2019-02-13 RX ADMIN — LINAGLIPTIN SCH MG: 5 TABLET, FILM COATED ORAL at 08:14

## 2019-02-13 RX ADMIN — BISACODYL 1 MG: 5 TABLET, COATED ORAL at 08:22

## 2019-02-13 RX ADMIN — BISACODYL SCH MG: 5 TABLET, COATED ORAL at 08:22

## 2019-02-13 RX ADMIN — DOCUSATE SODIUM 1 MG: 100 CAPSULE, LIQUID FILLED ORAL at 08:22

## 2019-02-13 RX ADMIN — ATORVASTATIN CALCIUM 1 MG: 40 TABLET, FILM COATED ORAL at 20:25

## 2019-02-13 RX ADMIN — INSULIN GLARGINE SCH UNITS: 100 INJECTION, SOLUTION SUBCUTANEOUS at 20:29

## 2019-02-13 RX ADMIN — INSULIN ASPART 1 UNIT: 100 INJECTION, SOLUTION INTRAVENOUS; SUBCUTANEOUS at 12:00

## 2019-02-13 RX ADMIN — LINAGLIPTIN 1 MG: 5 TABLET, FILM COATED ORAL at 08:14

## 2019-02-13 RX ADMIN — FAMOTIDINE 1 MG: 20 TABLET ORAL at 08:14

## 2019-02-13 RX ADMIN — DOCUSATE SODIUM 1 MG: 100 CAPSULE, LIQUID FILLED ORAL at 20:24

## 2019-02-13 NOTE — CONS
DATE OF ADMISSION: 01/31/2019

DATE OF CONSULTATION:  02/12/2019

 

 

 

TYPE OF CONSULTATION:  Neurological.

 

HISTORY OF PRESENT ILLNESS:  The patient is known to me, was seen last month at [facility] for acute stroke.  Currently, he is at rehabilitation facility.

 

He had MRI of the brain which shows acute left corona radiata and frontal periventricular white matte
r infarcts extending to posterior left lentiform nucleus.  Carotid ultrasound was negative for intern
al carotid arteries and mild stenosis was seen in the bilateral common carotid arteries.  He also suf
fers from diabetes and Parkinson's disease was diagnosed 3 to 4 months ago.  He was placed on carbido
pa and levodopa 25 to 100 mg for tremulousness and bradykinesia and he states that medication helps h
im.  Overall after stroke which he presented with right-sided weakness and dysarthria, he has improve
d.  He stated that he started ambulating with physical therapy even though he is relatively tremulous
 and slow because of Parkinson's symptoms.

 

ALLERGIES:  NONE.

 

CURRENT MEDICATIONS:

1.  Insulin.

2.  Celebrex.

3.  Colace.

4.  Dulcolax.

5.  Aspirin 81.

6.  Tradjenta.

7.  Metformin.

8.  Atorvastatin 40.

9.  Carbidopa and levodopa 1 tablet of 25/100 mg 3 times a day.

 

FAMILY HISTORY:  Noncontributory.

 

SOCIAL HISTORY:  No alcohol, tobacco, drug use.

 

PHYSICAL EXAMINATION

VITAL SIGNS:  Today, 98.4 temperature, 82 pulse, 18 respirations, 101/58 blood pressure.

GENERAL:  He is not in acute distress, lying in bed.

HEENT:  Normocephalic, atraumatic head.

NECK:  No carotid bruit, lymphadenopathy or thyromegaly.

LUNGS:  Clear to auscultation bilaterally.

CARDIAC:  Normal cardiac rhythm and sounds.

ABDOMEN:  Soft and nontender.

EXTREMITIES:  No cyanosis, clubbing or edema.

NEUROLOGIC:  He is awake, alert and oriented x3 with fluent speech.  Cranial nerve examination shows 
intact visual fields bilaterally.  Pupils are reactive to light from 3 to 2 mm bilaterally.  Extraocu
lar movements are intact without nystagmus.  No definite facial weakness, but mildly asymmetrical fac
e with right nasolabial fold flattening was noticed, preserved facial sensation.  Tongue is in midlin
e.  Palate elevates symmetrically.  Motor strength examination shows cogwheeling in bilateral upper e
xtremities, normal muscle bulk.  Very mild weakness in the right upper and lower extremities about 4-
/5, only trace of pronator drift.  Sensory examination is grossly intact to light touch and pain.  De
ep tendon reflexes are 2+ in the upper extremities, 1+ knee jerks, absent ankle jerks.  Equivocal res
ponse to plantar stimulation on the right and downgoing on the left.  Coordination is preserved on fi
nger-to-finger testing.  No dysmetria.  Postural tremor noticed.  Gait was not assessed.

 

IMPRESSION:  Acute ischemic stroke in patient with risk factors for stroke such as uncontrolled diabe
curtis.  On admission, hemoglobin A1c was 8.1.  Cholesterol 152, but LDL was slightly elevated at 105.  
Continue current treatment with different therapies as well as current treatment with aspirin and Lip
itor.  Keep patient normotensive and euglycemic.

 

Given cogwheeling, tremulousness and mild slowness, I think it is reasonable to increase the Sinemet 
slowly from 1 pill to a dose of 1-1/2 tablets 3 times a day of 25/100 mg tablets.  Continue current t
reatment otherwise.

 

Thank you very much for this interesting consultation.

 

 

Dictated By: ALBINA CEDENO/ESPERANZA

DD:    02/12/2019 22:36:39

DT:    02/13/2019 01:20:53

Conf#: [id number]

DID#:  [id number]

CC: NAZ GRAY MD; MARS POMPA MD;*End*

## 2019-02-13 NOTE — PN
Date/Time of Note


Date/Time of Note


DATE: 2/13/19 


TIME: 08:24





Assessment/Plan


VTE Prophylaxis


Risk score (from Ns)>0 risk:  4


[place] [facility] [place]


SCD contraindicated:  other


Pharmacological prophylaxis:  NA/contraindicated


Pharm contraindication:  other





Lines/Catheters


IV Catheter Type (from [facility]):  Saline Lock


Urinary Cath still in place:  No





Assessment/Plan


Hospital Course


No acute events overnight, patient participates in PT remains hemodynamically 


stable


Assessment/Plan


-Acute stroke with right-sided weakness.  Continue aspirin and Lipitor.  


Continue PT and OT.  S/p evaluation by  Dr. Braden  in neurology 


consultation.  


-Encephalopathy secondary to acute stroke


-Diabetes mellitus type 2 with hemoglobin A1c 8.1.  Continue metformin and 


Tradjenta, NovoLog per mild algorithm sliding scale.


-Parkinson's disease, continue Sinemet.


-S/p UTI, completed treatment with Rocephin





Further recommendations based on clinical course.  Plan of care discussed with 


Dr. Win.


Results 24hrs





Laboratory Tests


  Test
            2/12/19
11:56  2/12/19
17:47  2/12/19
20:11  2/13/19
08:14


  Bedside Glucose          151            111            144            103








Exam/Review of Systems


Exam


Vitals





Vital Signs


  Date      Temp  Pulse  Resp  B/P (MAP)   Pulse Ox  O2          O2 Flow    FiO2


Time                                                 Delivery    Rate


   2/13/19  98.2     72    18      118/60        98  Room Air


     02:00                           (79)








Intake and Output





2/12/19 2/12/19 2/13/19





1515:00


23:00


07:00





IntakeIntake Total


1200 ml


300 ml





OutputOutput Total


600 ml





BalanceBalance


600 ml


300 ml











Exam


Constitutional:  alert, oriented


Respiratory:  clear to auscultation


Cardiovascular:  nl pulse


Gastrointestinal:  soft, non-tender


Musculoskeletal:  nl extremities to inspection


Extremities:  normal pulses


Neurological:  other (R sided weakness)





Results


Results 24hrs





Laboratory Tests


  Test
            2/12/19
11:56  2/12/19
17:47  2/12/19
20:11  2/13/19
08:14


  Bedside Glucose          151            111            144            103








Medications


Medication





Current Medications


Aspirin (Halfprin) 81 mg DAILY PO  Last administered on 2/13/19at 08:14; Admin 


Dose 81 MG;  Start 2/1/19 at 09:00


Atorvastatin Calcium (Lipitor) 40 mg DAILY@21 PO  Last administered on 2/12/19at


20:12; Admin Dose 40 MG;  Start 1/31/19 at 22:30


Bisacodyl (Dulcolax) 10 mg DAILY PO  Last administered on 2/11/19at 08:58; Admin


Dose 10 MG;  Start 2/1/19 at 09:00


Clonidine (Catapres) 0.1 mg Q6H  PRN PO ELEVATED BLOOD PRESSURE;  Start 1/31/19 


at 23:00


Miscellaneous Information 1 ea NOTE XX ;  Start 1/31/19 at 23:00


Glucose (Glutose) 15 gm Q15M  PRN PO DECREASED GLUCOSE;  Start 1/31/19 at 23:00


Glucose (Glutose) 22.5 gm Q15M  PRN PO DECREASED GLUCOSE;  Start 1/31/19 at 


23:00


Dextrose (D50w Syringe) 25 ml Q15M  PRN IV DECREASED GLUCOSE;  Start 1/31/19 at 


23:00


Dextrose (D50w Syringe) 50 ml Q15M  PRN IV DECREASED GLUCOSE;  Start 1/31/19 at 


23:00


Glucagon (Glucagen) 1 mg Q15M  PRN IM DECREASED GLUCOSE;  Start 1/31/19 at 23:00


Glucose (Glutose) 15 gm Q15M  PRN BUCCAL DECREASED GLUCOSE;  Start 1/31/19 at 


23:00


Famotidine (Pepcid) 20 mg Q12 PO  Last administered on 2/13/19at 08:14; Admin 


Dose 20 MG;  Start 1/31/19 at 22:30


Insulin Aspart (Novolog Insulin Pen) (Adult SC Insulin - Moder... WITH MEALS  


BEDTIME SC  Last administered on 2/12/19at 11:57; Admin Dose 2 UNIT;  Start 


1/31/19 at 22:30


Diagnostic Test (Pha) (Accu-Chek) 1 ea 02 XX  Last administered on 2/6/19at 


02:37; Admin Dose 1 EA;  Start 2/1/19 at 02:00


Linagliptin (Tradjenta) 5 mg DAILY PO  Last administered on 2/13/19at 08:14; 


Admin Dose 5 MG;  Start 2/1/19 at 09:00


Metformin HCl (Glucophage) 500 mg BID WITH  MEALS PO  Last administered on 


2/13/19at 08:15; Admin Dose 500 MG;  Start 2/1/19 at 07:35


Ondansetron HCl (Zofran Inj) 4 mg Q6H  PRN IV NAUSEA AND/OR VOMITING;  Start 


1/31/19 at 23:00


Senna (Senokot) 1 tab HS PO  Last administered on 2/10/19at 20:42; Admin Dose 1 


TAB;  Start 2/1/19 at 21:00


Magnesium Hydroxide (Milk Of Mag) 30 ml BID  PRN PO CONSTIPATION Last 


administered on 2/1/19at 20:48; Admin Dose 30 ML;  Start 2/1/19 at 00:00


Lactulose (Enulose) 20 gm DAILY  PRN PO CONSTIPATION Last administered on 


2/2/19at 08:40; Admin Dose 20 GM;  Start 2/1/19 at 00:00


Acetaminophen (Tylenol Tab) 650 mg Q4H  PRN PO PAIN Last administered on 


2/5/19at 09:46; Admin Dose 650 MG;  Start 2/1/19 at 00:00


Miscellaneous Information (Pending Santyl Order For Wound Care) This patient 


ha... PRN  PRN XX WOUND CARE;  Start 2/1/19 at 00:00


Bisacodyl (Dulcolax Supp) 10 mg DAILY  PRN [place] CONSTIPATION Last administered on 


2/3/19at 17:58; Admin Dose 10 MG;  Start 2/2/19 at 10:30


Sodium Biphosphate/ Sodium Phosphate (Fleet Enema) 133 ml DAILY  PRN [place] 


CONSTIPATION;  Start 2/2/19 at 10:30


Polyethylene Glycol (Miralax) 17 gm DAILY  PRN PO CONSTIPATION Last administered


on 2/2/19at 12:31; Admin Dose 17 GM;  Start 2/2/19 at 10:30


Docusate Sodium (Colace) 100 mg BID PO  Last administered on 2/11/19at 20:47; 


Admin Dose 100 MG;  Start 2/3/19 at 09:00


Celecoxib (Celebrex) 100 mg DAILY  PRN PO pain Last administered on 2/11/19at 


08:57; Admin Dose 100 MG;  Start 2/5/19 at 11:30


Insulin Glargine (Lantus) 6 units DAILY@2000 SC  Last administered on 2/12/19at 


20:22; Admin Dose 6 UNITS;  Start 2/6/19 at 20:00


Carbidopa/Levodopa (Sinemet (25/ 100)) 1.5 tab TID PO  Last administered on 


2/13/19at 08:15; Admin Dose 1.5 TAB;  Start 2/13/19 at 09:00











LUCY CHEN             Feb 13, 2019 08:25

## 2019-02-13 NOTE — PN
Date/Time of Note


Date/Time of Note


DATE: 2/13/19 


TIME: 13:12





Objective





Vital Signs


  Date      Temp  Pulse  Resp  B/P (MAP)   Pulse Ox  O2          O2 Flow    FiO2


Time                                                 Delivery    Rate


   2/13/19  97.9     68    18      126/60        97  Room Air


     07:30                           (82)








Intake and Output





2/12/19 2/12/19 2/13/19





1515:00


23:00


07:00





IntakeIntake Total


1200 ml


300 ml





OutputOutput Total


600 ml





BalanceBalance


600 ml


300 ml











Exam





INTERDISCIPLINARY TEAM CONFERENCE





Physical Exam:


Pulm-cta


Abd-soft








BOWEL- Cont/incont


BLADDER-incont 


SKIN- improving











OT-     DRESSING-min/max


   BATHING-min/max


   TOILETING-max





PT-      BED MOBILITY-mod


   TRANSFERS-mod


   AMBULATION-mod 5 feet x 2


   


SPEECH- 


COGNITION- mod











A/P-


Interdisciplinary team conference held today. Please see interdisciplinary 


sheet.  Working toward d.c. on 2/22 with post discharge follow up of physical 


therapy, occupational therapy.





Results/Medications


Results 24 hrs





Laboratory Tests


  Test
            2/12/19
17:47  2/12/19
20:11  2/13/19
08:14  2/13/19
11:59


  Bedside Glucose          111            144            103            133





Medications





Current Medications


Aspirin (Halfprin) 81 mg DAILY PO  Last administered on 2/13/19at 08:14; Admin 


Dose 81 MG;  Start 2/1/19 at 09:00


Atorvastatin Calcium (Lipitor) 40 mg DAILY@21 PO  Last administered on 2/12/19at


20:12; Admin Dose 40 MG;  Start 1/31/19 at 22:30


Bisacodyl (Dulcolax) 10 mg DAILY PO  Last administered on 2/11/19at 08:58; Admin


Dose 10 MG;  Start 2/1/19 at 09:00


Clonidine (Catapres) 0.1 mg Q6H  PRN PO ELEVATED BLOOD PRESSURE;  Start 1/31/19 


at 23:00


Miscellaneous Information 1 ea NOTE XX ;  Start 1/31/19 at 23:00


Glucose (Glutose) 15 gm Q15M  PRN PO DECREASED GLUCOSE;  Start 1/31/19 at 23:00


Glucose (Glutose) 22.5 gm Q15M  PRN PO DECREASED GLUCOSE;  Start 1/31/19 at 


23:00


Dextrose (D50w Syringe) 25 ml Q15M  PRN IV DECREASED GLUCOSE;  Start 1/31/19 at 


23:00


Dextrose (D50w Syringe) 50 ml Q15M  PRN IV DECREASED GLUCOSE;  Start 1/31/19 at 


23:00


Glucagon (Glucagen) 1 mg Q15M  PRN IM DECREASED GLUCOSE;  Start 1/31/19 at 23:00


Glucose (Glutose) 15 gm Q15M  PRN BUCCAL DECREASED GLUCOSE;  Start 1/31/19 at 


23:00


Famotidine (Pepcid) 20 mg Q12 PO  Last administered on 2/13/19at 08:14; Admin 


Dose 20 MG;  Start 1/31/19 at 22:30


Insulin Aspart (Novolog Insulin Pen) (Adult SC Insulin - Moder... WITH MEALS  


BEDTIME SC  Last administered on 2/12/19at 11:57; Admin Dose 2 UNIT;  Start 


1/31/19 at 22:30


Diagnostic Test (Pha) (Accu-Chek) 1 ea 02 XX  Last administered on 2/6/19at 


02:37; Admin Dose 1 EA;  Start 2/1/19 at 02:00


Linagliptin (Tradjenta) 5 mg DAILY PO  Last administered on 2/13/19at 08:14; 


Admin Dose 5 MG;  Start 2/1/19 at 09:00


Metformin HCl (Glucophage) 500 mg BID WITH  MEALS PO  Last administered on 


2/13/19at 08:15; Admin Dose 500 MG;  Start 2/1/19 at 07:35


Ondansetron HCl (Zofran Inj) 4 mg Q6H  PRN IV NAUSEA AND/OR VOMITING;  Start 


1/31/19 at 23:00


Senna (Senokot) 1 tab HS PO  Last administered on 2/10/19at 20:42; Admin Dose 1 


TAB;  Start 2/1/19 at 21:00


Magnesium Hydroxide (Milk Of Mag) 30 ml BID  PRN PO CONSTIPATION Last 


administered on 2/1/19at 20:48; Admin Dose 30 ML;  Start 2/1/19 at 00:00


Lactulose (Enulose) 20 gm DAILY  PRN PO CONSTIPATION Last administered on 


2/2/19at 08:40; Admin Dose 20 GM;  Start 2/1/19 at 00:00


Acetaminophen (Tylenol Tab) 650 mg Q4H  PRN PO PAIN Last administered on 


2/5/19at 09:46; Admin Dose 650 MG;  Start 2/1/19 at 00:00


Miscellaneous Information (Pending Santyl Order For Wound Care) This patient 


ha... PRN  PRN XX WOUND CARE;  Start 2/1/19 at 00:00


Bisacodyl (Dulcolax Supp) 10 mg DAILY  PRN [place] CONSTIPATION Last administered on 


2/3/19at 17:58; Admin Dose 10 MG;  Start 2/2/19 at 10:30


Sodium Biphosphate/ Sodium Phosphate (Fleet Enema) 133 ml DAILY  PRN [place] 


CONSTIPATION;  Start 2/2/19 at 10:30


Polyethylene Glycol (Miralax) 17 gm DAILY  PRN PO CONSTIPATION Last administered


on 2/2/19at 12:31; Admin Dose 17 GM;  Start 2/2/19 at 10:30


Docusate Sodium (Colace) 100 mg BID PO  Last administered on 2/11/19at 20:47; 


Admin Dose 100 MG;  Start 2/3/19 at 09:00


Celecoxib (Celebrex) 100 mg DAILY  PRN PO pain Last administered on 2/13/19at 


12:14; Admin Dose 100 MG;  Start 2/5/19 at 11:30


Insulin Glargine (Lantus) 6 units DAILY@2000 SC  Last administered on 2/12/19at 


20:22; Admin Dose 6 UNITS;  Start 2/6/19 at 20:00


Carbidopa/Levodopa (Sinemet (25/ 100)) 1.5 tab TID PO  Last administered on 


2/13/19at 12:14; Admin Dose 1.5 TAB;  Start 2/13/19 at 09:00











MARS POMPA MD             Feb 13, 2019 13:12

## 2019-02-13 NOTE — PN
DATE:  02/13/2019

 

 

PSYCHOLOGY -- INDIVIDUAL SESSION -- 11040

 

This is a followup on a patient that was seen last week.  The patient was seen 

up in his wheelchair.  The patient is still very confused but doing better.  The

patient is motivated to continue to try to help himself and does feel like he is

making progress.  I worked with the patient supportively to try to help him 

continue to have a positive outlook and positive mood.  The patient was willing 

to work with this and did feel that his mood was improving.  The patient is 

still confused and is frustrated by his underlying confusion.

 

 

Dictated By: ANUP PALUMBO PHD

 

ZAIDA/ESPERANZA

DD:    02/13/2019 16:49:10

DT:    02/13/2019 19:12:59

Conf#: [id number]

DID#:  [id number]

 

MTDFADIA

## 2019-02-14 VITALS — DIASTOLIC BLOOD PRESSURE: 63 MMHG | RESPIRATION RATE: 18 BRPM | SYSTOLIC BLOOD PRESSURE: 127 MMHG | HEART RATE: 82 BPM

## 2019-02-14 VITALS — DIASTOLIC BLOOD PRESSURE: 63 MMHG | HEART RATE: 78 BPM | RESPIRATION RATE: 18 BRPM | SYSTOLIC BLOOD PRESSURE: 104 MMHG

## 2019-02-14 VITALS — SYSTOLIC BLOOD PRESSURE: 116 MMHG | RESPIRATION RATE: 18 BRPM | HEART RATE: 64 BPM | DIASTOLIC BLOOD PRESSURE: 59 MMHG

## 2019-02-14 VITALS — SYSTOLIC BLOOD PRESSURE: 113 MMHG | DIASTOLIC BLOOD PRESSURE: 64 MMHG | HEART RATE: 78 BPM | RESPIRATION RATE: 19 BRPM

## 2019-02-14 RX ADMIN — CARBIDOPA AND LEVODOPA 1 TAB: 25; 100 TABLET ORAL at 08:34

## 2019-02-14 RX ADMIN — FAMOTIDINE 1 MG: 20 TABLET ORAL at 08:34

## 2019-02-14 RX ADMIN — ATORVASTATIN CALCIUM 1 MG: 40 TABLET, FILM COATED ORAL at 20:57

## 2019-02-14 RX ADMIN — DOCUSATE SODIUM 1 MG: 100 CAPSULE, LIQUID FILLED ORAL at 08:34

## 2019-02-14 RX ADMIN — ASPIRIN 1 MG: 81 TABLET, COATED ORAL at 08:34

## 2019-02-14 RX ADMIN — DOCUSATE SODIUM SCH MG: 100 CAPSULE, LIQUID FILLED ORAL at 20:57

## 2019-02-14 RX ADMIN — INSULIN ASPART 1 UNIT: 100 INJECTION, SOLUTION INTRAVENOUS; SUBCUTANEOUS at 07:35

## 2019-02-14 RX ADMIN — CARBIDOPA AND LEVODOPA SCH TAB: 25; 100 TABLET ORAL at 17:33

## 2019-02-14 RX ADMIN — LINAGLIPTIN 1 MG: 5 TABLET, FILM COATED ORAL at 08:35

## 2019-02-14 RX ADMIN — INSULIN ASPART 1 UNIT: 100 INJECTION, SOLUTION INTRAVENOUS; SUBCUTANEOUS at 11:55

## 2019-02-14 RX ADMIN — FAMOTIDINE 1 MG: 20 TABLET ORAL at 20:57

## 2019-02-14 RX ADMIN — DOCUSATE SODIUM 1 MG: 100 CAPSULE, LIQUID FILLED ORAL at 20:57

## 2019-02-14 RX ADMIN — INSULIN GLARGINE 1 UNITS: 100 INJECTION, SOLUTION SUBCUTANEOUS at 21:03

## 2019-02-14 RX ADMIN — FAMOTIDINE SCH MG: 20 TABLET ORAL at 08:34

## 2019-02-14 RX ADMIN — DOCUSATE SODIUM SCH MG: 100 CAPSULE, LIQUID FILLED ORAL at 08:34

## 2019-02-14 RX ADMIN — SENNOSIDES 1 TAB: 8.6 TABLET, FILM COATED ORAL at 20:57

## 2019-02-14 RX ADMIN — CARBIDOPA AND LEVODOPA 1 TAB: 25; 100 TABLET ORAL at 17:33

## 2019-02-14 RX ADMIN — CARBIDOPA AND LEVODOPA SCH TAB: 25; 100 TABLET ORAL at 08:34

## 2019-02-14 RX ADMIN — INSULIN GLARGINE SCH UNITS: 100 INJECTION, SOLUTION SUBCUTANEOUS at 21:03

## 2019-02-14 RX ADMIN — INSULIN ASPART 1 UNIT: 100 INJECTION, SOLUTION INTRAVENOUS; SUBCUTANEOUS at 17:35

## 2019-02-14 RX ADMIN — SENNOSIDES SCH TAB: 8.6 TABLET, FILM COATED ORAL at 20:57

## 2019-02-14 RX ADMIN — FAMOTIDINE SCH MG: 20 TABLET ORAL at 20:57

## 2019-02-14 RX ADMIN — CARBIDOPA AND LEVODOPA 1 TAB: 25; 100 TABLET ORAL at 20:57

## 2019-02-14 RX ADMIN — INSULIN ASPART 1 UNIT: 100 INJECTION, SOLUTION INTRAVENOUS; SUBCUTANEOUS at 21:00

## 2019-02-14 RX ADMIN — BISACODYL SCH MG: 5 TABLET, COATED ORAL at 08:35

## 2019-02-14 RX ADMIN — ATORVASTATIN CALCIUM SCH MG: 40 TABLET, FILM COATED ORAL at 20:57

## 2019-02-14 RX ADMIN — LINAGLIPTIN SCH MG: 5 TABLET, FILM COATED ORAL at 08:35

## 2019-02-14 RX ADMIN — ASPIRIN SCH MG: 81 TABLET, COATED ORAL at 08:34

## 2019-02-14 RX ADMIN — BISACODYL 1 MG: 5 TABLET, COATED ORAL at 08:35

## 2019-02-14 RX ADMIN — CARBIDOPA AND LEVODOPA SCH TAB: 25; 100 TABLET ORAL at 20:57

## 2019-02-14 NOTE — PN
Date/Time of Note


Date/Time of Note


DATE: 2/14/19 


TIME: 12:38





Subjective


Cognition improving





Objective





Vital Signs


  Date      Temp  Pulse  Resp  B/P (MAP)   Pulse Ox  O2          O2 Flow    FiO2


Time                                                 Delivery    Rate


   2/14/19  97.7     64    18      116/59        97  Room Air


     07:00                           (78)








Intake and Output





2/13/19 2/13/19 2/14/19





1515:00


23:00


07:00





IntakeIntake Total


820 ml


900 ml





BalanceBalance


820 ml


900 ml











Exam


pulm-cta


mod transfer





Results/Medications


Results 24 hrs





Laboratory Tests


  Test
            2/13/19
17:36  2/13/19
20:28  2/14/19
08:09  2/14/19
11:53


  Bedside Glucose          147            140            133            149





Medications





Current Medications


Aspirin (Halfprin) 81 mg DAILY PO  Last administered on 2/14/19at 08:34; Admin 


Dose 81 MG;  Start 2/1/19 at 09:00


Atorvastatin Calcium (Lipitor) 40 mg DAILY@21 PO  Last administered on 2/13/19at


20:25; Admin Dose 40 MG;  Start 1/31/19 at 22:30


Bisacodyl (Dulcolax) 10 mg DAILY PO  Last administered on 2/14/19at 08:35; Admin


Dose 10 MG;  Start 2/1/19 at 09:00


Clonidine (Catapres) 0.1 mg Q6H  PRN PO ELEVATED BLOOD PRESSURE;  Start 1/31/19 


at 23:00


Miscellaneous Information 1 ea NOTE XX ;  Start 1/31/19 at 23:00


Glucose (Glutose) 15 gm Q15M  PRN PO DECREASED GLUCOSE;  Start 1/31/19 at 23:00


Glucose (Glutose) 22.5 gm Q15M  PRN PO DECREASED GLUCOSE;  Start 1/31/19 at 


23:00


Dextrose (D50w Syringe) 25 ml Q15M  PRN IV DECREASED GLUCOSE;  Start 1/31/19 at 


23:00


Dextrose (D50w Syringe) 50 ml Q15M  PRN IV DECREASED GLUCOSE;  Start 1/31/19 at 


23:00


Glucagon (Glucagen) 1 mg Q15M  PRN IM DECREASED GLUCOSE;  Start 1/31/19 at 23:00


Glucose (Glutose) 15 gm Q15M  PRN BUCCAL DECREASED GLUCOSE;  Start 1/31/19 at 


23:00


Famotidine (Pepcid) 20 mg Q12 PO  Last administered on 2/14/19at 08:34; Admin 


Dose 20 MG;  Start 1/31/19 at 22:30


Insulin Aspart (Novolog Insulin Pen) (Adult SC Insulin - Moder... WITH MEALS  


BEDTIME SC  Last administered on 2/14/19at 11:55; Admin Dose 2 UNIT;  Start 


1/31/19 at 22:30


Diagnostic Test (Pha) (Accu-Chek) 1 ea 02 XX  Last administered on 2/6/19at 


02:37; Admin Dose 1 EA;  Start 2/1/19 at 02:00


Linagliptin (Tradjenta) 5 mg DAILY PO  Last administered on 2/14/19at 08:35; 


Admin Dose 5 MG;  Start 2/1/19 at 09:00


Metformin HCl (Glucophage) 500 mg BID WITH  MEALS PO  Last administered on 


2/14/19at 08:10; Admin Dose 500 MG;  Start 2/1/19 at 07:35


Ondansetron HCl (Zofran Inj) 4 mg Q6H  PRN IV NAUSEA AND/OR VOMITING;  Start 


1/31/19 at 23:00


Senna (Senokot) 1 tab HS PO  Last administered on 2/10/19at 20:42; Admin Dose 1 


TAB;  Start 2/1/19 at 21:00


Magnesium Hydroxide (Milk Of Mag) 30 ml BID  PRN PO CONSTIPATION Last 


administered on 2/1/19at 20:48; Admin Dose 30 ML;  Start 2/1/19 at 00:00


Lactulose (Enulose) 20 gm DAILY  PRN PO CONSTIPATION Last administered on 


2/2/19at 08:40; Admin Dose 20 GM;  Start 2/1/19 at 00:00


Acetaminophen (Tylenol Tab) 650 mg Q4H  PRN PO PAIN Last administered on 2/5/1


9at 09:46; Admin Dose 650 MG;  Start 2/1/19 at 00:00


Miscellaneous Information (Pending [facility] Order For Wound Care) This patient 


ha... PRN  PRN XX WOUND CARE;  Start 2/1/19 at 00:00


Bisacodyl (Dulcolax Supp) 10 mg DAILY  PRN [place] CONSTIPATION Last administered on 


2/3/19at 17:58; Admin Dose 10 MG;  Start 2/2/19 at 10:30


Sodium Biphosphate/ Sodium Phosphate (Fleet Enema) 133 ml DAILY  PRN [place] 


CONSTIPATION;  Start 2/2/19 at 10:30


Polyethylene Glycol (Miralax) 17 gm DAILY  PRN PO CONSTIPATION Last administered


on 2/2/19at 12:31; Admin Dose 17 GM;  Start 2/2/19 at 10:30


Docusate Sodium (Colace) 100 mg BID PO  Last administered on 2/14/19at 08:34; 


Admin Dose 100 MG;  Start 2/3/19 at 09:00


Celecoxib (Celebrex) 100 mg DAILY  PRN PO pain Last administered on 2/13/19at 


12:14; Admin Dose 100 MG;  Start 2/5/19 at 11:30


Insulin Glargine (Lantus) 6 units DAILY@2000 SC  Last administered on 2/13/19at 


20:29; Admin Dose 6 UNITS;  Start 2/6/19 at 20:00


Carbidopa/Levodopa (Sinemet (25/ 100)) 1.5 tab TID PO  Last administered on 2 /14/19at 08:34; Admin Dose 1.5 TAB;  Start 2/13/19 at 09:00





Assessment/Plan


Additional Assessment/Plan


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


sided weakness; Parkinsons


   Continue rehab activities, will decrease speech therapy, as his cognition has


improved, and will focus on mobility and self care tasks


Diabetes mellitus.


Bilateral common carotid artery stenosis.


Urinary tract infection.


Dysphagia











MARS POMPA MD             Feb 14, 2019 12:39

## 2019-02-15 VITALS — HEART RATE: 64 BPM | RESPIRATION RATE: 18 BRPM | SYSTOLIC BLOOD PRESSURE: 123 MMHG | DIASTOLIC BLOOD PRESSURE: 61 MMHG

## 2019-02-15 VITALS — RESPIRATION RATE: 18 BRPM | SYSTOLIC BLOOD PRESSURE: 115 MMHG | DIASTOLIC BLOOD PRESSURE: 60 MMHG | HEART RATE: 84 BPM

## 2019-02-15 VITALS — DIASTOLIC BLOOD PRESSURE: 63 MMHG | RESPIRATION RATE: 18 BRPM | HEART RATE: 64 BPM | SYSTOLIC BLOOD PRESSURE: 121 MMHG

## 2019-02-15 VITALS — SYSTOLIC BLOOD PRESSURE: 116 MMHG | DIASTOLIC BLOOD PRESSURE: 67 MMHG | RESPIRATION RATE: 18 BRPM | HEART RATE: 83 BPM

## 2019-02-15 RX ADMIN — SENNOSIDES SCH TAB: 8.6 TABLET, FILM COATED ORAL at 20:31

## 2019-02-15 RX ADMIN — CARBIDOPA AND LEVODOPA SCH TAB: 25; 100 TABLET ORAL at 20:31

## 2019-02-15 RX ADMIN — LINAGLIPTIN 1 MG: 5 TABLET, FILM COATED ORAL at 08:49

## 2019-02-15 RX ADMIN — FAMOTIDINE 1 MG: 20 TABLET ORAL at 08:48

## 2019-02-15 RX ADMIN — DOCUSATE SODIUM 1 MG: 100 CAPSULE, LIQUID FILLED ORAL at 20:31

## 2019-02-15 RX ADMIN — INSULIN GLARGINE SCH UNITS: 100 INJECTION, SOLUTION SUBCUTANEOUS at 21:11

## 2019-02-15 RX ADMIN — BISACODYL SCH MG: 5 TABLET, COATED ORAL at 08:49

## 2019-02-15 RX ADMIN — CARBIDOPA AND LEVODOPA SCH TAB: 25; 100 TABLET ORAL at 08:48

## 2019-02-15 RX ADMIN — LINAGLIPTIN SCH MG: 5 TABLET, FILM COATED ORAL at 08:49

## 2019-02-15 RX ADMIN — CARBIDOPA AND LEVODOPA 1 TAB: 25; 100 TABLET ORAL at 20:31

## 2019-02-15 RX ADMIN — SENNOSIDES 1 TAB: 8.6 TABLET, FILM COATED ORAL at 20:31

## 2019-02-15 RX ADMIN — DOCUSATE SODIUM 1 MG: 100 CAPSULE, LIQUID FILLED ORAL at 08:48

## 2019-02-15 RX ADMIN — ASPIRIN 1 MG: 81 TABLET, COATED ORAL at 08:49

## 2019-02-15 RX ADMIN — INSULIN ASPART 1 UNIT: 100 INJECTION, SOLUTION INTRAVENOUS; SUBCUTANEOUS at 17:33

## 2019-02-15 RX ADMIN — FAMOTIDINE 1 MG: 20 TABLET ORAL at 20:31

## 2019-02-15 RX ADMIN — INSULIN ASPART 1 UNIT: 100 INJECTION, SOLUTION INTRAVENOUS; SUBCUTANEOUS at 12:00

## 2019-02-15 RX ADMIN — INSULIN ASPART 1 UNIT: 100 INJECTION, SOLUTION INTRAVENOUS; SUBCUTANEOUS at 07:35

## 2019-02-15 RX ADMIN — ATORVASTATIN CALCIUM 1 MG: 40 TABLET, FILM COATED ORAL at 20:31

## 2019-02-15 RX ADMIN — ASPIRIN SCH MG: 81 TABLET, COATED ORAL at 08:49

## 2019-02-15 RX ADMIN — DOCUSATE SODIUM SCH MG: 100 CAPSULE, LIQUID FILLED ORAL at 20:31

## 2019-02-15 RX ADMIN — ATORVASTATIN CALCIUM SCH MG: 40 TABLET, FILM COATED ORAL at 20:31

## 2019-02-15 RX ADMIN — FAMOTIDINE SCH MG: 20 TABLET ORAL at 20:31

## 2019-02-15 RX ADMIN — BISACODYL 1 MG: 5 TABLET, COATED ORAL at 08:49

## 2019-02-15 RX ADMIN — CARBIDOPA AND LEVODOPA SCH TAB: 25; 100 TABLET ORAL at 13:20

## 2019-02-15 RX ADMIN — FAMOTIDINE SCH MG: 20 TABLET ORAL at 08:48

## 2019-02-15 RX ADMIN — CARBIDOPA AND LEVODOPA 1 TAB: 25; 100 TABLET ORAL at 13:20

## 2019-02-15 RX ADMIN — INSULIN GLARGINE 1 UNITS: 100 INJECTION, SOLUTION SUBCUTANEOUS at 21:11

## 2019-02-15 RX ADMIN — CARBIDOPA AND LEVODOPA 1 TAB: 25; 100 TABLET ORAL at 08:48

## 2019-02-15 RX ADMIN — INSULIN ASPART 1 UNIT: 100 INJECTION, SOLUTION INTRAVENOUS; SUBCUTANEOUS at 20:34

## 2019-02-15 RX ADMIN — DOCUSATE SODIUM SCH MG: 100 CAPSULE, LIQUID FILLED ORAL at 08:48

## 2019-02-15 NOTE — PN
Date/Time of Note


Date/Time of Note


DATE: 2/15/19 


TIME: 10:57





Subjective


Patient awake, no complaints





Objective





Vital Signs


  Date      Temp  Pulse  Resp  B/P (MAP)   Pulse Ox  O2          O2 Flow    FiO2


Time                                                 Delivery    Rate


   2/15/19  97.6     64    18      123/61        97  Room Air


     07:00                           (81)








Intake and Output





2/14/19


2/14/19


2/15/19





1515:00


23:00


07:00





IntakeIntake Total


1200 ml


200 ml





OutputOutput Total


750 ml





BalanceBalance


450 ml


200 ml











Exam


pulm-cta


abd-soft


min bed mobility


max transfer





Results/Medications


Results 24 hrs





Laboratory Tests


  Test
            2/14/19
11:53  2/14/19
17:32  2/14/19
20:59  2/15/19
07:39


  Bedside Glucose          149             91            152             77





Medications





Current Medications


Aspirin (Halfprin) 81 mg DAILY PO  Last administered on 2/15/19at 08:49; Admin 


Dose 81 MG;  Start 2/1/19 at 09:00


Atorvastatin Calcium (Lipitor) 40 mg DAILY@21 PO  Last administered on 2/14/19at


20:57; Admin Dose 40 MG;  Start 1/31/19 at 22:30


Bisacodyl (Dulcolax) 10 mg DAILY PO  Last administered on 2/15/19at 08:49; Admin


Dose 10 MG;  Start 2/1/19 at 09:00


Clonidine (Catapres) 0.1 mg Q6H  PRN PO ELEVATED BLOOD PRESSURE;  Start 1/31/19 


at 23:00


Miscellaneous Information 1 ea NOTE XX ;  Start 1/31/19 at 23:00


Glucose (Glutose) 15 gm Q15M  PRN PO DECREASED GLUCOSE;  Start 1/31/19 at 23:00


Glucose (Glutose) 22.5 gm Q15M  PRN PO DECREASED GLUCOSE;  Start 1/31/19 at 


23:00


Dextrose (D50w Syringe) 25 ml Q15M  PRN IV DECREASED GLUCOSE;  Start 1/31/19 at 


23:00


Dextrose (D50w Syringe) 50 ml Q15M  PRN IV DECREASED GLUCOSE;  Start 1/31/19 at 


23:00


Glucagon (Glucagen) 1 mg Q15M  PRN IM DECREASED GLUCOSE;  Start 1/31/19 at 23:00


Glucose (Glutose) 15 gm Q15M  PRN BUCCAL DECREASED GLUCOSE;  Start 1/31/19 at 


23:00


Famotidine (Pepcid) 20 mg Q12 PO  Last administered on 2/15/19at 08:48; Admin 


Dose 20 MG;  Start 1/31/19 at 22:30


Insulin Aspart (Novolog Insulin Pen) (Adult SC Insulin - Moder... WITH MEALS  


BEDTIME SC  Last administered on 2/14/19at 11:55; Admin Dose 2 UNIT;  Start 


1/31/19 at 22:30


Diagnostic Test (Pha) (Accu-Chek) 1 ea 02 XX  Last administered on 2/6/19at 


02:37; Admin Dose 1 EA;  Start 2/1/19 at 02:00


Linagliptin (Tradjenta) 5 mg DAILY PO  Last administered on 2/15/19at 08:49; 


Admin Dose 5 MG;  Start 2/1/19 at 09:00


Metformin HCl (Glucophage) 500 mg BID WITH  MEALS PO  Last administered on 


2/15/19at 07:41; Admin Dose 500 MG;  Start 2/1/19 at 07:35


Ondansetron HCl (Zofran Inj) 4 mg Q6H  PRN IV NAUSEA AND/OR VOMITING;  Start 


1/31/19 at 23:00


Senna (Senokot) 1 tab HS PO  Last administered on 2/14/19at 20:57; Admin Dose 1 


TAB;  Start 2/1/19 at 21:00


Magnesium Hydroxide (Milk Of Mag) 30 ml BID  PRN PO CONSTIPATION Last 


administered on 2/1/19at 20:48; Admin Dose 30 ML;  Start 2/1/19 at 00:00


Lactulose (Enulose) 20 gm DAILY  PRN PO CONSTIPATION Last administered on 


2/2/19at 08:40; Admin Dose 20 GM;  Start 2/1/19 at 00:00


Acetaminophen (Tylenol Tab) 650 mg Q4H  PRN PO PAIN Last administered on 


2/5/19at 09:46; Admin Dose 650 MG;  Start 2/1/19 at 00:00


Miscellaneous Information (Pending Santyl Order For Wound Care) This patient 


ha... PRN  PRN XX WOUND CARE;  Start 2/1/19 at 00:00


Bisacodyl (Dulcolax Supp) 10 mg DAILY  PRN [place] CONSTIPATION Last administered on 


2/3/19at 17:58; Admin Dose 10 MG;  Start 2/2/19 at 10:30


Sodium Biphosphate/ Sodium Phosphate (Fleet Enema) 133 ml DAILY  PRN [place] 


CONSTIPATION;  Start 2/2/19 at 10:30


Polyethylene Glycol (Miralax) 17 gm DAILY  PRN PO CONSTIPATION Last administered


on 2/2/19at 12:31; Admin Dose 17 GM;  Start 2/2/19 at 10:30


Docusate Sodium (Colace) 100 mg BID PO  Last administered on 2/15/19at 08:48; 


Admin Dose 100 MG;  Start 2/3/19 at 09:00


Celecoxib (Celebrex) 100 mg DAILY  PRN PO pain Last administered on 2/13/19at 


12:14; Admin Dose 100 MG;  Start 2/5/19 at 11:30


Insulin Glargine (Lantus) 6 units DAILY@2000 SC  Last administered on 2/14/19at 


21:03; Admin Dose 6 UNITS;  Start 2/6/19 at 20:00


Carbidopa/Levodopa (Sinemet (25/ 100)) 1.5 tab TID PO  Last administered on 


2/15/19at 08:48; Admin Dose 1.5 TAB;  Start 2/13/19 at 09:00





Assessment/Plan


Additional Assessment/Plan


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


sided weakness; Parkinsons


   Continue rehab program. Activity tolerance improving.


Diabetes mellitus.


Bilateral common carotid artery stenosis.


Urinary tract infection.


Dysphagia











MARS POMPA MD             Feb 15, 2019 10:58

## 2019-02-16 VITALS — SYSTOLIC BLOOD PRESSURE: 122 MMHG | HEART RATE: 80 BPM | DIASTOLIC BLOOD PRESSURE: 65 MMHG | RESPIRATION RATE: 18 BRPM

## 2019-02-16 VITALS — SYSTOLIC BLOOD PRESSURE: 121 MMHG | RESPIRATION RATE: 18 BRPM | HEART RATE: 62 BPM | DIASTOLIC BLOOD PRESSURE: 72 MMHG

## 2019-02-16 VITALS — SYSTOLIC BLOOD PRESSURE: 122 MMHG | DIASTOLIC BLOOD PRESSURE: 51 MMHG | RESPIRATION RATE: 18 BRPM | HEART RATE: 54 BPM

## 2019-02-16 VITALS — HEART RATE: 77 BPM | RESPIRATION RATE: 17 BRPM | SYSTOLIC BLOOD PRESSURE: 111 MMHG | DIASTOLIC BLOOD PRESSURE: 66 MMHG

## 2019-02-16 RX ADMIN — INSULIN GLARGINE 1 UNITS: 100 INJECTION, SOLUTION SUBCUTANEOUS at 20:41

## 2019-02-16 RX ADMIN — DOCUSATE SODIUM SCH MG: 100 CAPSULE, LIQUID FILLED ORAL at 09:32

## 2019-02-16 RX ADMIN — DOCUSATE SODIUM 1 MG: 100 CAPSULE, LIQUID FILLED ORAL at 09:32

## 2019-02-16 RX ADMIN — FAMOTIDINE SCH MG: 20 TABLET ORAL at 09:32

## 2019-02-16 RX ADMIN — CARBIDOPA AND LEVODOPA 1 TAB: 25; 100 TABLET ORAL at 09:33

## 2019-02-16 RX ADMIN — CARBIDOPA AND LEVODOPA 1 TAB: 25; 100 TABLET ORAL at 20:30

## 2019-02-16 RX ADMIN — BISACODYL 1 MG: 5 TABLET, COATED ORAL at 09:32

## 2019-02-16 RX ADMIN — CARBIDOPA AND LEVODOPA SCH TAB: 25; 100 TABLET ORAL at 20:30

## 2019-02-16 RX ADMIN — INSULIN ASPART 1 UNIT: 100 INJECTION, SOLUTION INTRAVENOUS; SUBCUTANEOUS at 12:30

## 2019-02-16 RX ADMIN — ACETAMINOPHEN 1 MG: 325 TABLET, FILM COATED ORAL at 16:01

## 2019-02-16 RX ADMIN — FAMOTIDINE SCH MG: 20 TABLET ORAL at 20:30

## 2019-02-16 RX ADMIN — DOCUSATE SODIUM SCH MG: 100 CAPSULE, LIQUID FILLED ORAL at 20:30

## 2019-02-16 RX ADMIN — INSULIN ASPART 1 UNIT: 100 INJECTION, SOLUTION INTRAVENOUS; SUBCUTANEOUS at 07:35

## 2019-02-16 RX ADMIN — FAMOTIDINE 1 MG: 20 TABLET ORAL at 09:32

## 2019-02-16 RX ADMIN — FAMOTIDINE 1 MG: 20 TABLET ORAL at 20:30

## 2019-02-16 RX ADMIN — LINAGLIPTIN 1 MG: 5 TABLET, FILM COATED ORAL at 09:35

## 2019-02-16 RX ADMIN — CARBIDOPA AND LEVODOPA 1 TAB: 25; 100 TABLET ORAL at 12:28

## 2019-02-16 RX ADMIN — INSULIN ASPART 1 UNIT: 100 INJECTION, SOLUTION INTRAVENOUS; SUBCUTANEOUS at 17:19

## 2019-02-16 RX ADMIN — SENNOSIDES SCH TAB: 8.6 TABLET, FILM COATED ORAL at 20:30

## 2019-02-16 RX ADMIN — ATORVASTATIN CALCIUM 1 MG: 40 TABLET, FILM COATED ORAL at 20:30

## 2019-02-16 RX ADMIN — BISACODYL SCH MG: 5 TABLET, COATED ORAL at 09:32

## 2019-02-16 RX ADMIN — ASPIRIN 1 MG: 81 TABLET, COATED ORAL at 09:33

## 2019-02-16 RX ADMIN — DOCUSATE SODIUM 1 MG: 100 CAPSULE, LIQUID FILLED ORAL at 20:30

## 2019-02-16 RX ADMIN — SENNOSIDES 1 TAB: 8.6 TABLET, FILM COATED ORAL at 20:30

## 2019-02-16 RX ADMIN — ATORVASTATIN CALCIUM SCH MG: 40 TABLET, FILM COATED ORAL at 20:30

## 2019-02-16 RX ADMIN — INSULIN ASPART 1 UNIT: 100 INJECTION, SOLUTION INTRAVENOUS; SUBCUTANEOUS at 20:32

## 2019-02-16 RX ADMIN — ASPIRIN SCH MG: 81 TABLET, COATED ORAL at 09:33

## 2019-02-16 RX ADMIN — INSULIN GLARGINE SCH UNITS: 100 INJECTION, SOLUTION SUBCUTANEOUS at 20:41

## 2019-02-16 RX ADMIN — CARBIDOPA AND LEVODOPA SCH TAB: 25; 100 TABLET ORAL at 12:28

## 2019-02-16 RX ADMIN — LINAGLIPTIN SCH MG: 5 TABLET, FILM COATED ORAL at 09:35

## 2019-02-16 RX ADMIN — CARBIDOPA AND LEVODOPA SCH TAB: 25; 100 TABLET ORAL at 09:33

## 2019-02-16 NOTE — PN
Date/Time of Note


Date/Time of Note


DATE: 2/16/19 


TIME: 15:13





Assessment/Plan


VTE Prophylaxis


Risk score (from Nsg)>0 risk:  4


SCD applied (from Nsg):  No





Lines/Catheters


IV Catheter Type (from Nrsg):  Saline Lock


Urinary Cath still in place:  No





Assessment/Plan


Assessment/Plan


-Acute stroke with right-sided weakness.  Continue aspirin and Lipitor.  


Continue PT and OT.  S/p evaluation by  Dr. Braden  in neurology 


consultation.  


-Encephalopathy secondary to acute stroke


-Diabetes mellitus type 2 with hemoglobin A1c 8.1.  Continue metformin and 


Tradjenta, NovoLog per mild algorithm sliding scale.


-Parkinson's disease, continue Sinemet.


-S/p UTI, completed treatment with Rocephin





Further recommendations based on clinical course.  Plan of care discussed with 


Dr. iWn.


Results 24hrs





Laboratory Tests


  Test
            2/15/19
17:32  2/15/19
20:19  2/16/19
07:38  2/16/19
09:34


  Bedside Glucose          121            121            68  L          166


  Test
            2/16/19
12:01  
              
              



  Bedside Glucose          159








Exam/Review of Systems


Exam


Vitals





Vital Signs


  Date      Temp  Pulse  Resp  B/P (MAP)   Pulse Ox  O2          O2 Flow    FiO2


Time                                                 Delivery    Rate


   2/16/19  97.0     54    18      122/51        96  Room Air


     08:00                           (74)








Intake and Output





2/15/19


2/15/19


2/16/19





1515:00


23:00


07:00





IntakeIntake Total


1200 ml


400 ml





OutputOutput Total


800 ml


400 ml





BalanceBalance


400 ml


0 ml














Results


Results 24hrs





Laboratory Tests


  Test
            2/15/19
17:32  2/15/19
20:19  2/16/19
07:38  2/16/19
09:34


  Bedside Glucose          121            121            68  L          166


  Test
            2/16/19
12:01  
              
              



  Bedside Glucose          159








Medications


Medication





Current Medications


Aspirin (Halfprin) 81 mg DAILY PO  Last administered on 2/16/19at 09:33; Admin 


Dose 81 MG;  Start 2/1/19 at 09:00


Atorvastatin Calcium (Lipitor) 40 mg DAILY@21 PO  Last administered on 2/15/19at


20:31; Admin Dose 40 MG;  Start 1/31/19 at 22:30


Bisacodyl (Dulcolax) 10 mg DAILY PO  Last administered on 2/16/19at 09:32; Admin


Dose 10 MG;  Start 2/1/19 at 09:00


Clonidine (Catapres) 0.1 mg Q6H  PRN PO ELEVATED BLOOD PRESSURE;  Start 1/31/19 


at 23:00


Miscellaneous Information 1 ea NOTE XX ;  Start 1/31/19 at 23:00


Glucose (Glutose) 15 gm Q15M  PRN PO DECREASED GLUCOSE;  Start 1/31/19 at 23:00


Glucose (Glutose) 22.5 gm Q15M  PRN PO DECREASED GLUCOSE;  Start 1/31/19 at 


23:00


Dextrose (D50w Syringe) 25 ml Q15M  PRN IV DECREASED GLUCOSE;  Start 1/31/19 at 


23:00


Dextrose (D50w Syringe) 50 ml Q15M  PRN IV DECREASED GLUCOSE;  Start 1/31/19 at 


23:00


Glucagon (Glucagen) 1 mg Q15M  PRN IM DECREASED GLUCOSE;  Start 1/31/19 at 23:00


Glucose (Glutose) 15 gm Q15M  PRN BUCCAL DECREASED GLUCOSE;  Start 1/31/19 at 


23:00


Famotidine (Pepcid) 20 mg Q12 PO  Last administered on 2/16/19at 09:32; Admin 


Dose 20 MG;  Start 1/31/19 at 22:30


Insulin Aspart (Novolog Insulin Pen) (Adult SC Insulin - Moder... WITH MEALS  


BEDTIME SC  Last administered on 2/16/19at 12:30; Admin Dose 2 UNIT;  Start 


1/31/19 at 22:30


Diagnostic Test (Pha) (Accu-Chek) 1 ea 02 XX  Last administered on 2/6/19at 


02:37; Admin Dose 1 EA;  Start 2/1/19 at 02:00


Linagliptin (Tradjenta) 5 mg DAILY PO  Last administered on 2/16/19at 09:35; 


Admin Dose 5 MG;  Start 2/1/19 at 09:00


Ondansetron HCl (Zofran Inj) 4 mg Q6H  PRN IV NAUSEA AND/OR VOMITING;  Start 


1/31/19 at 23:00


Senna (Senokot) 1 tab HS PO  Last administered on 2/15/19at 20:31; Admin Dose 1 


TAB;  Start 2/1/19 at 21:00


Magnesium Hydroxide (Milk Of Mag) 30 ml BID  PRN PO CONSTIPATION Last 


administered on 2/1/19at 20:48; Admin Dose 30 ML;  Start 2/1/19 at 00:00


Lactulose (Enulose) 20 gm DAILY  PRN PO CONSTIPATION Last administered on 


2/2/19at 08:40; Admin Dose 20 GM;  Start 2/1/19 at 00:00


Acetaminophen (Tylenol Tab) 650 mg Q4H  PRN PO PAIN Last administered on 2/5/19


at 09:46; Admin Dose 650 MG;  Start 2/1/19 at 00:00


Miscellaneous Information (Pending Santyl Order For Wound Care) This patient 


ha... PRN  PRN XX WOUND CARE;  Start 2/1/19 at 00:00


Bisacodyl (Dulcolax Supp) 10 mg DAILY  PRN [place] CONSTIPATION Last administered on 


2/3/19at 17:58; Admin Dose 10 MG;  Start 2/2/19 at 10:30


Sodium Biphosphate/ Sodium Phosphate (Fleet Enema) 133 ml DAILY  PRN [place] 


CONSTIPATION;  Start 2/2/19 at 10:30


Polyethylene Glycol (Miralax) 17 gm DAILY  PRN PO CONSTIPATION Last administered


on 2/2/19at 12:31; Admin Dose 17 GM;  Start 2/2/19 at 10:30


Docusate Sodium (Colace) 100 mg BID PO  Last administered on 2/16/19at 09:32; 


Admin Dose 100 MG;  Start 2/3/19 at 09:00


Celecoxib (Celebrex) 100 mg DAILY  PRN PO pain Last administered on 2/13/19at 1


2:14; Admin Dose 100 MG;  Start 2/5/19 at 11:30


Insulin Glargine (Lantus) 6 units DAILY@2000 SC  Last administered on 2/15/19at 


21:11; Admin Dose 6 UNITS;  Start 2/6/19 at 20:00


Carbidopa/Levodopa (Sinemet (25/ 100)) 1.5 tab TID PO  Last administered on 2/ 16/19at 12:28; Admin Dose 1.5 TAB;  Start 2/13/19 at 09:00


Metformin HCl (Glucophage) 500 mg WITH  BREAKFAST PO ;  Start 2/17/19 at 07:35











BERYL GARCIA                 Feb 16, 2019 15:13

## 2019-02-16 NOTE — PN
Date/Time of Note


Date/Time of Note


DATE: 2/16/19 


TIME: 07:32





Subjective


DOING WELL NO C/O





Objective





Vital Signs


  Date      Temp  Pulse  Resp  B/P (MAP)   Pulse Ox  O2          O2 Flow    FiO2


Time                                                 Delivery    Rate


   2/16/19  97.6     62    18      121/72        94  Room Air


     02:00                           (88)








Intake and Output





2/15/19


2/15/19


2/16/19





1515:00


23:00


07:00





IntakeIntake Total


1200 ml


400 ml





OutputOutput Total


800 ml


400 ml





BalanceBalance


400 ml


0 ml











Exam


LUNGS CTA


 COR RRR


 [facility] [employer]


CLOF BED MOB MIN AND XT MODA





Results/Medications


Results 24 hrs





Laboratory Tests


  Test
            2/15/19
07:39  2/15/19
11:35  2/15/19
17:32  2/15/19
20:19


  Bedside Glucose           77            119            121            121





Medications





Current Medications


Aspirin (Halfprin) 81 mg DAILY PO  Last administered on 2/15/19at 08:49; Admin 


Dose 81 MG;  Start 2/1/19 at 09:00


Atorvastatin Calcium (Lipitor) 40 mg DAILY@21 PO  Last administered on 2/15/19at


20:31; Admin Dose 40 MG;  Start 1/31/19 at 22:30


Bisacodyl (Dulcolax) 10 mg DAILY PO  Last administered on 2/15/19at 08:49; Admin


Dose 10 MG;  Start 2/1/19 at 09:00


Clonidine (Catapres) 0.1 mg Q6H  PRN PO ELEVATED BLOOD PRESSURE;  Start 1/31/19 


at 23:00


Miscellaneous Information 1 ea NOTE XX ;  Start 1/31/19 at 23:00


Glucose (Glutose) 15 gm Q15M  PRN PO DECREASED GLUCOSE;  Start 1/31/19 at 23:00


Glucose (Glutose) 22.5 gm Q15M  PRN PO DECREASED GLUCOSE;  Start 1/31/19 at 


23:00


Dextrose (D50w Syringe) 25 ml Q15M  PRN IV DECREASED GLUCOSE;  Start 1/31/19 at 


23:00


Dextrose (D50w Syringe) 50 ml Q15M  PRN IV DECREASED GLUCOSE;  Start 1/31/19 at 


23:00


Glucagon (Glucagen) 1 mg Q15M  PRN IM DECREASED GLUCOSE;  Start 1/31/19 at 23:00


Glucose (Glutose) 15 gm Q15M  PRN BUCCAL DECREASED GLUCOSE;  Start 1/31/19 at 


23:00


Famotidine (Pepcid) 20 mg Q12 PO  Last administered on 2/15/19at 20:31; Admin 


Dose 20 MG;  Start 1/31/19 at 22:30


Insulin Aspart (Novolog Insulin Pen) (Adult SC Insulin - Moder... WITH MEALS  


BEDTIME SC  Last administered on 2/14/19at 11:55; Admin Dose 2 UNIT;  Start 


1/31/19 at 22:30


Diagnostic Test (Pha) (Accu-Chek) 1 ea 02 XX  Last administered on 2/6/19at 


02:37; Admin Dose 1 EA;  Start 2/1/19 at 02:00


Linagliptin (Tradjenta) 5 mg DAILY PO  Last administered on 2/15/19at 08:49; 


Admin Dose 5 MG;  Start 2/1/19 at 09:00


Metformin HCl (Glucophage) 500 mg BID WITH  MEALS PO  Last administered on 


2/15/19at 17:33; Admin Dose 500 MG;  Start 2/1/19 at 07:35


Ondansetron HCl (Zofran Inj) 4 mg Q6H  PRN IV NAUSEA AND/OR VOMITING;  Start 


1/31/19 at 23:00


Senna (Senokot) 1 tab HS PO  Last administered on 2/15/19at 20:31; Admin Dose 1 


TAB;  Start 2/1/19 at 21:00


Magnesium Hydroxide (Milk Of Mag) 30 ml BID  PRN PO CONSTIPATION Last 


administered on 2/1/19at 20:48; Admin Dose 30 ML;  Start 2/1/19 at 00:00


Lactulose (Enulose) 20 gm DAILY  PRN PO CONSTIPATION Last administered on 


2/2/19at 08:40; Admin Dose 20 GM;  Start 2/1/19 at 00:00


Acetaminophen (Tylenol Tab) 650 mg Q4H  PRN PO PAIN Last administered on 


2/5/19at 09:46; Admin Dose 650 MG;  Start 2/1/19 at 00:00


Miscellaneous Information (Pending Santyl Order For Wound Care) This patient 


ha... PRN  PRN XX WOUND CARE;  Start 2/1/19 at 00:00


Bisacodyl (Dulcolax Supp) 10 mg DAILY  PRN [place] CONSTIPATION Last administered on 


2/3/19at 17:58; Admin Dose 10 MG;  Start 2/2/19 at 10:30


Sodium Biphosphate/ Sodium Phosphate (Fleet Enema) 133 ml DAILY  PRN [place] 


CONSTIPATION;  Start 2/2/19 at 10:30


Polyethylene Glycol (Miralax) 17 gm DAILY  PRN PO CONSTIPATION Last administered


on 2/2/19at 12:31; Admin Dose 17 GM;  Start 2/2/19 at 10:30


Docusate Sodium (Colace) 100 mg BID PO  Last administered on 2/15/19at 20:31; 


Admin Dose 100 MG;  Start 2/3/19 at 09:00


Celecoxib (Celebrex) 100 mg DAILY  PRN PO pain Last administered on 2/13/19at 


12:14; Admin Dose 100 MG;  Start 2/5/19 at 11:30


Insulin Glargine (Lantus) 6 units DAILY@2000 SC  Last administered on 2/15/19at 


21:11; Admin Dose 6 UNITS;  Start 2/6/19 at 20:00


Carbidopa/Levodopa (Sinemet (25/ 100)) 1.5 tab TID PO  Last administered on 


2/15/19at 20:31; Admin Dose 1.5 TAB;  Start 2/13/19 at 09:00





Assessment/Plan


Additional Assessment/Plan


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


sided weakness; Parkinsons


   Continue rehab program. Activity tolerance improving.


Diabetes mellitus.MAINTAIN BS <150


Bilateral common carotid artery stenosis.


Urinary tract infection.


Dysphagia ASPIRATION PRECAUTIONS











SETH POMPA MD          Feb 16, 2019 07:34

## 2019-02-17 VITALS — RESPIRATION RATE: 18 BRPM | SYSTOLIC BLOOD PRESSURE: 118 MMHG | HEART RATE: 72 BPM | DIASTOLIC BLOOD PRESSURE: 62 MMHG

## 2019-02-17 VITALS — SYSTOLIC BLOOD PRESSURE: 127 MMHG | DIASTOLIC BLOOD PRESSURE: 68 MMHG | HEART RATE: 65 BPM | RESPIRATION RATE: 18 BRPM

## 2019-02-17 VITALS — RESPIRATION RATE: 18 BRPM | SYSTOLIC BLOOD PRESSURE: 132 MMHG | DIASTOLIC BLOOD PRESSURE: 63 MMHG | HEART RATE: 61 BPM

## 2019-02-17 VITALS — DIASTOLIC BLOOD PRESSURE: 57 MMHG | SYSTOLIC BLOOD PRESSURE: 110 MMHG | RESPIRATION RATE: 18 BRPM | HEART RATE: 76 BPM

## 2019-02-17 RX ADMIN — INSULIN ASPART 1 UNIT: 100 INJECTION, SOLUTION INTRAVENOUS; SUBCUTANEOUS at 20:54

## 2019-02-17 RX ADMIN — INSULIN ASPART 1 UNIT: 100 INJECTION, SOLUTION INTRAVENOUS; SUBCUTANEOUS at 17:35

## 2019-02-17 RX ADMIN — CARBIDOPA AND LEVODOPA SCH TAB: 25; 100 TABLET ORAL at 20:55

## 2019-02-17 RX ADMIN — CARBIDOPA AND LEVODOPA 1 TAB: 25; 100 TABLET ORAL at 12:43

## 2019-02-17 RX ADMIN — INSULIN ASPART 1 UNIT: 100 INJECTION, SOLUTION INTRAVENOUS; SUBCUTANEOUS at 07:35

## 2019-02-17 RX ADMIN — ASPIRIN 1 MG: 81 TABLET, COATED ORAL at 08:58

## 2019-02-17 RX ADMIN — CARBIDOPA AND LEVODOPA SCH TAB: 25; 100 TABLET ORAL at 09:02

## 2019-02-17 RX ADMIN — LINAGLIPTIN 1 MG: 5 TABLET, FILM COATED ORAL at 09:02

## 2019-02-17 RX ADMIN — CARBIDOPA AND LEVODOPA SCH TAB: 25; 100 TABLET ORAL at 12:43

## 2019-02-17 RX ADMIN — SENNOSIDES 1 TAB: 8.6 TABLET, FILM COATED ORAL at 20:54

## 2019-02-17 RX ADMIN — CARBIDOPA AND LEVODOPA 1 TAB: 25; 100 TABLET ORAL at 20:55

## 2019-02-17 RX ADMIN — INSULIN ASPART 1 UNIT: 100 INJECTION, SOLUTION INTRAVENOUS; SUBCUTANEOUS at 12:01

## 2019-02-17 RX ADMIN — ATORVASTATIN CALCIUM 1 MG: 40 TABLET, FILM COATED ORAL at 20:54

## 2019-02-17 RX ADMIN — ASPIRIN SCH MG: 81 TABLET, COATED ORAL at 08:58

## 2019-02-17 RX ADMIN — BISACODYL SCH MG: 5 TABLET, COATED ORAL at 09:00

## 2019-02-17 RX ADMIN — DOCUSATE SODIUM SCH MG: 100 CAPSULE, LIQUID FILLED ORAL at 20:55

## 2019-02-17 RX ADMIN — FAMOTIDINE SCH MG: 20 TABLET ORAL at 08:58

## 2019-02-17 RX ADMIN — DOCUSATE SODIUM 1 MG: 100 CAPSULE, LIQUID FILLED ORAL at 20:55

## 2019-02-17 RX ADMIN — FAMOTIDINE 1 MG: 20 TABLET ORAL at 20:55

## 2019-02-17 RX ADMIN — INSULIN GLARGINE 1 UNITS: 100 INJECTION, SOLUTION SUBCUTANEOUS at 20:53

## 2019-02-17 RX ADMIN — DOCUSATE SODIUM SCH MG: 100 CAPSULE, LIQUID FILLED ORAL at 09:00

## 2019-02-17 RX ADMIN — DOCUSATE SODIUM 1 MG: 100 CAPSULE, LIQUID FILLED ORAL at 09:00

## 2019-02-17 RX ADMIN — LINAGLIPTIN SCH MG: 5 TABLET, FILM COATED ORAL at 09:02

## 2019-02-17 RX ADMIN — SENNOSIDES SCH TAB: 8.6 TABLET, FILM COATED ORAL at 20:54

## 2019-02-17 RX ADMIN — CARBIDOPA AND LEVODOPA 1 TAB: 25; 100 TABLET ORAL at 09:02

## 2019-02-17 RX ADMIN — BISACODYL 1 MG: 5 TABLET, COATED ORAL at 09:00

## 2019-02-17 RX ADMIN — FAMOTIDINE 1 MG: 20 TABLET ORAL at 08:58

## 2019-02-17 RX ADMIN — INSULIN GLARGINE SCH UNITS: 100 INJECTION, SOLUTION SUBCUTANEOUS at 20:53

## 2019-02-17 RX ADMIN — ATORVASTATIN CALCIUM SCH MG: 40 TABLET, FILM COATED ORAL at 20:54

## 2019-02-17 RX ADMIN — FAMOTIDINE SCH MG: 20 TABLET ORAL at 20:55

## 2019-02-17 NOTE — PN
Date/Time of Note


Date/Time of Note


DATE: 2/17/19 


TIME: 15:04





Assessment/Plan


VTE Prophylaxis


Risk score (from Nsg)>0 risk:  4


SCD applied (from Nsg):  No





Lines/Catheters


IV Catheter Type (from Nrsg):  Saline Lock


Urinary Cath still in place:  No





Assessment/Plan


Assessment/Plan


-Acute stroke with right-sided weakness.  Continue aspirin and Lipitor.  


Continue PT and OT.  S/p evaluation by  Dr. Braden  in neurology 


consultation.  


-Encephalopathy secondary to acute stroke


-Diabetes mellitus type 2 with hemoglobin A1c 8.1.  Continue metformin and 


Tradjenta, NovoLog per mild algorithm sliding scale.


-Parkinson's disease, continue Sinemet.


-S/p UTI, completed treatment with Rocephin





Further recommendations based on clinical course.  Plan of care discussed with 


Dr. Win.


Results 24hrs





Laboratory Tests


  Test
            2/16/19
17:16  2/16/19
20:07  2/17/19
01:56  2/17/19
08:04


  Bedside Glucose          105           236  H           88             96


  Test
            2/17/19
11:56  
              
              



  Bedside Glucose          150








Exam/Review of Systems


Exam


Vitals





Vital Signs


  Date      Temp  Pulse  Resp  B/P (MAP)   Pulse Ox  O2          O2 Flow    FiO2


Time                                                 Delivery    Rate


   2/17/19  97.9     74    18      127/68       100  Room Air


     14:00                           (87)








Intake and Output





2/16/19 2/16/19 2/17/19





1515:00


23:00


07:00





IntakeIntake Total


850 ml


950 ml





OutputOutput Total


500 ml





BalanceBalance


350 ml


950 ml














Results


Results 24hrs





Laboratory Tests


  Test
            2/16/19
17:16  2/16/19
20:07  2/17/19
01:56  2/17/19
08:04


  Bedside Glucose          105           236  H           88             96


  Test
            2/17/19
11:56  
              
              



  Bedside Glucose          150








Medications


Medication





Current Medications


Aspirin (Halfprin) 81 mg DAILY PO  Last administered on 2/17/19at 08:58; Admin 


Dose 81 MG;  Start 2/1/19 at 09:00


Atorvastatin Calcium (Lipitor) 40 mg DAILY@21 PO  Last administered on 2/16/19at


20:30; Admin Dose 40 MG;  Start 1/31/19 at 22:30


Clonidine (Catapres) 0.1 mg Q6H  PRN PO ELEVATED BLOOD PRESSURE;  Start 1/31/19 


at 23:00


Miscellaneous Information 1 ea NOTE XX ;  Start 1/31/19 at 23:00


Glucose (Glutose) 15 gm Q15M  PRN PO DECREASED GLUCOSE;  Start 1/31/19 at 23:00


Glucose (Glutose) 22.5 gm Q15M  PRN PO DECREASED GLUCOSE;  Start 1/31/19 at 


23:00


Dextrose (D50w Syringe) 25 ml Q15M  PRN IV DECREASED GLUCOSE;  Start 1/31/19 at 


23:00


Dextrose (D50w Syringe) 50 ml Q15M  PRN IV DECREASED GLUCOSE;  Start 1/31/19 at 


23:00


Glucagon (Glucagen) 1 mg Q15M  PRN IM DECREASED GLUCOSE;  Start 1/31/19 at 23:00


Glucose (Glutose) 15 gm Q15M  PRN BUCCAL DECREASED GLUCOSE;  Start 1/31/19 at 


23:00


Famotidine (Pepcid) 20 mg Q12 PO  Last administered on 2/17/19at 08:58; Admin 


Dose 20 MG;  Start 1/31/19 at 22:30


Insulin Aspart (Novolog Insulin Pen) (Adult SC Insulin - Moder... WITH MEALS  


BEDTIME SC  Last administered on 2/17/19at 12:01; Admin Dose 2 UNIT;  Start 


1/31/19 at 22:30


Diagnostic Test (Pha) (Accu-Chek) 1 ea 02 XX  Last administered on 2/17/19at 


02:20; Admin Dose 1 EA;  Start 2/1/19 at 02:00


Linagliptin (Tradjenta) 5 mg DAILY PO  Last administered on 2/17/19at 09:02; 


Admin Dose 5 MG;  Start 2/1/19 at 09:00


Ondansetron HCl (Zofran Inj) 4 mg Q6H  PRN IV NAUSEA AND/OR VOMITING;  Start 


1/31/19 at 23:00


Senna (Senokot) 1 tab HS PO  Last administered on 2/16/19at 20:30; Admin Dose 1 


TAB;  Start 2/1/19 at 21:00


Magnesium Hydroxide (Milk Of Mag) 30 ml BID  PRN PO CONSTIPATION Last 


administered on 2/1/19at 20:48; Admin Dose 30 ML;  Start 2/1/19 at 00:00


Lactulose (Enulose) 20 gm DAILY  PRN PO CONSTIPATION Last administered on 2 /2/19at 08:40; Admin Dose 20 GM;  Start 2/1/19 at 00:00


Acetaminophen (Tylenol Tab) 650 mg Q4H  PRN PO PAIN Last administered on 


2/16/19at 16:01; Admin Dose 650 MG;  Start 2/1/19 at 00:00


Miscellaneous Information (Pending Santyl Order For Wound Care) This patient 


ha... PRN  PRN XX WOUND CARE;  Start 2/1/19 at 00:00


Bisacodyl (Dulcolax Supp) 10 mg DAILY  PRN [place] CONSTIPATION Last administered on 


2/3/19at 17:58; Admin Dose 10 MG;  Start 2/2/19 at 10:30


Sodium Biphosphate/ Sodium Phosphate (Fleet Enema) 133 ml DAILY  PRN [place] 


CONSTIPATION;  Start 2/2/19 at 10:30


Polyethylene Glycol (Miralax) 17 gm DAILY  PRN PO CONSTIPATION Last administered


on 2/2/19at 12:31; Admin Dose 17 GM;  Start 2/2/19 at 10:30


Docusate Sodium (Colace) 100 mg BID PO  Last administered on 2/16/19at 20:30; 


Admin Dose 100 MG;  Start 2/3/19 at 09:00


Celecoxib (Celebrex) 100 mg DAILY  PRN PO pain Last administered on 2/13/19at 


12:14; Admin Dose 100 MG;  Start 2/5/19 at 11:30


Insulin Glargine (Lantus) 6 units DAILY@2000 SC  Last administered on 2/16/19at 


20:41; Admin Dose 6 UNITS;  Start 2/6/19 at 20:00


Carbidopa/Levodopa (Sinemet (25/ 100)) 1.5 tab TID PO  Last administered on 


2/17/19at 12:43; Admin Dose 1.5 TAB;  Start 2/13/19 at 09:00


Metformin HCl (Glucophage) 500 mg WITH  BREAKFAST PO  Last administered on 


2/17/19at 08:06; Admin Dose 500 MG;  Start 2/17/19 at 07:35











BERYL GARCIA                 Feb 17, 2019 15:04

## 2019-02-18 VITALS — SYSTOLIC BLOOD PRESSURE: 110 MMHG | DIASTOLIC BLOOD PRESSURE: 61 MMHG | RESPIRATION RATE: 18 BRPM | HEART RATE: 74 BPM

## 2019-02-18 VITALS — DIASTOLIC BLOOD PRESSURE: 59 MMHG | SYSTOLIC BLOOD PRESSURE: 116 MMHG | HEART RATE: 79 BPM | RESPIRATION RATE: 18 BRPM

## 2019-02-18 VITALS — RESPIRATION RATE: 18 BRPM | DIASTOLIC BLOOD PRESSURE: 67 MMHG | HEART RATE: 67 BPM | SYSTOLIC BLOOD PRESSURE: 131 MMHG

## 2019-02-18 VITALS — HEART RATE: 62 BPM | RESPIRATION RATE: 18 BRPM | SYSTOLIC BLOOD PRESSURE: 136 MMHG | DIASTOLIC BLOOD PRESSURE: 64 MMHG

## 2019-02-18 RX ADMIN — INSULIN GLARGINE 1 UNITS: 100 INJECTION, SOLUTION SUBCUTANEOUS at 20:07

## 2019-02-18 RX ADMIN — INSULIN ASPART 1 UNIT: 100 INJECTION, SOLUTION INTRAVENOUS; SUBCUTANEOUS at 21:00

## 2019-02-18 RX ADMIN — CARBIDOPA AND LEVODOPA SCH TAB: 25; 100 TABLET ORAL at 23:30

## 2019-02-18 RX ADMIN — LINAGLIPTIN 1 MG: 5 TABLET, FILM COATED ORAL at 07:50

## 2019-02-18 RX ADMIN — LINAGLIPTIN SCH MG: 5 TABLET, FILM COATED ORAL at 07:50

## 2019-02-18 RX ADMIN — CARBIDOPA AND LEVODOPA 1 TAB: 25; 100 TABLET ORAL at 21:00

## 2019-02-18 RX ADMIN — CARBIDOPA AND LEVODOPA SCH TAB: 25; 100 TABLET ORAL at 08:29

## 2019-02-18 RX ADMIN — FAMOTIDINE 1 MG: 20 TABLET ORAL at 08:29

## 2019-02-18 RX ADMIN — INSULIN ASPART 1 UNIT: 100 INJECTION, SOLUTION INTRAVENOUS; SUBCUTANEOUS at 07:35

## 2019-02-18 RX ADMIN — ATORVASTATIN CALCIUM 1 MG: 40 TABLET, FILM COATED ORAL at 21:21

## 2019-02-18 RX ADMIN — CARBIDOPA AND LEVODOPA SCH TAB: 25; 100 TABLET ORAL at 12:06

## 2019-02-18 RX ADMIN — INSULIN ASPART 1 UNIT: 100 INJECTION, SOLUTION INTRAVENOUS; SUBCUTANEOUS at 17:41

## 2019-02-18 RX ADMIN — DOCUSATE SODIUM 1 MG: 100 CAPSULE, LIQUID FILLED ORAL at 21:00

## 2019-02-18 RX ADMIN — INSULIN ASPART 1 UNIT: 100 INJECTION, SOLUTION INTRAVENOUS; SUBCUTANEOUS at 12:00

## 2019-02-18 RX ADMIN — FAMOTIDINE SCH MG: 20 TABLET ORAL at 21:21

## 2019-02-18 RX ADMIN — CARBIDOPA AND LEVODOPA 1 TAB: 25; 100 TABLET ORAL at 12:06

## 2019-02-18 RX ADMIN — SENNOSIDES SCH TAB: 8.6 TABLET, FILM COATED ORAL at 21:00

## 2019-02-18 RX ADMIN — ASPIRIN 1 MG: 81 TABLET, COATED ORAL at 08:29

## 2019-02-18 RX ADMIN — ASPIRIN SCH MG: 81 TABLET, COATED ORAL at 08:29

## 2019-02-18 RX ADMIN — CARBIDOPA AND LEVODOPA 1 TAB: 25; 100 TABLET ORAL at 08:29

## 2019-02-18 RX ADMIN — CARBIDOPA AND LEVODOPA 1 TAB: 25; 100 TABLET ORAL at 23:30

## 2019-02-18 RX ADMIN — FAMOTIDINE SCH MG: 20 TABLET ORAL at 08:29

## 2019-02-18 RX ADMIN — FAMOTIDINE 1 MG: 20 TABLET ORAL at 21:21

## 2019-02-18 RX ADMIN — DOCUSATE SODIUM SCH MG: 100 CAPSULE, LIQUID FILLED ORAL at 21:00

## 2019-02-18 RX ADMIN — SENNOSIDES 1 TAB: 8.6 TABLET, FILM COATED ORAL at 21:00

## 2019-02-18 RX ADMIN — INSULIN GLARGINE SCH UNITS: 100 INJECTION, SOLUTION SUBCUTANEOUS at 20:07

## 2019-02-18 RX ADMIN — CARBIDOPA AND LEVODOPA SCH TAB: 25; 100 TABLET ORAL at 21:00

## 2019-02-18 RX ADMIN — DOCUSATE SODIUM SCH MG: 100 CAPSULE, LIQUID FILLED ORAL at 08:29

## 2019-02-18 RX ADMIN — DOCUSATE SODIUM 1 MG: 100 CAPSULE, LIQUID FILLED ORAL at 08:29

## 2019-02-18 RX ADMIN — ATORVASTATIN CALCIUM SCH MG: 40 TABLET, FILM COATED ORAL at 21:21

## 2019-02-18 NOTE — CONS
Consultation Date/Type/Reason


Admit Date/Time


Jan 31, 2019 at 21:02


Initial Consult Date





Type of Consult


Rehab Cross cover note.


Date/Time of Note


DATE: 2/18/19 


TIME: 16:40





24 HR Interval Summary


Free Text/Dictation


GENERAL: Elderly appearing gentleman


VITAL SIGNS:  per chart


NECK:  Supple.  No JVD or lymphadenopathy.


CARDIAC EXAM:  S1, S2. No added sounds or murmurs.


CHEST:  clear bilaterally, No added sounds, rales or wheezes


ABDOMEN:  Soft, nontender.  No guarding or rebound.


EXTREMITIES:  No cyanosis, clubbing or edema.








Assessment / plan





Rehab-  Left corona radiata frontal infarct cerebrovascular accident with 


right-sided weakness; Parkinsons


Functional level is Mod A gait 15Ftwith wheelchair propulsion, mod a transfer, 


SUP  ub dressing, mod A lb dressing. 





Diabetes mellitus.


Bilateral common carotid artery stenosis.


Urinary tract infection.


Dysphagia





Exam/Review of Systems


Exam


Vitals





Vital Signs


  Date      Temp  Pulse  Resp  B/P (MAP)   Pulse Ox  O2          O2 Flow    FiO2


Time                                                 Delivery    Rate


   2/18/19  97.8     79    18      116/59        95  Room Air


     14:00                           (78)








Intake and Output





2/17/19 2/17/19 2/18/19





1515:00


23:00


07:00





IntakeIntake Total


1200 ml


260 ml





OutputOutput Total


800 ml





BalanceBalance


400 ml


260 ml














Results


Results 24hrs





Laboratory Tests


  Test
            2/17/19
17:40  2/17/19
20:52  2/18/19
01:38  2/18/19
07:43


  Bedside Glucose          104            203            111             82


  Test
            2/18/19
12:04  
              
              



  Bedside Glucose          123








Medications


Medication





Current Medications


Aspirin (Halfprin) 81 mg DAILY PO  Last administered on 2/18/19at 08:29; Admin 


Dose 81 MG;  Start 2/1/19 at 09:00


Atorvastatin Calcium (Lipitor) 40 mg DAILY@21 PO  Last administered on 2/17/19at


20:54; Admin Dose 40 MG;  Start 1/31/19 at 22:30


Clonidine (Catapres) 0.1 mg Q6H  PRN PO ELEVATED BLOOD PRESSURE;  Start 1/31/19 


at 23:00


Miscellaneous Information 1 ea NOTE XX ;  Start 1/31/19 at 23:00


Glucose (Glutose) 15 gm Q15M  PRN PO DECREASED GLUCOSE;  Start 1/31/19 at 23:00


Glucose (Glutose) 22.5 gm Q15M  PRN PO DECREASED GLUCOSE;  Start 1/31/19 at 


23:00


Dextrose (D50w Syringe) 25 ml Q15M  PRN IV DECREASED GLUCOSE;  Start 1/31/19 at 


23:00


Dextrose (D50w Syringe) 50 ml Q15M  PRN IV DECREASED GLUCOSE;  Start 1/31/19 at 


23:00


Glucagon (Glucagen) 1 mg Q15M  PRN IM DECREASED GLUCOSE;  Start 1/31/19 at 23:00


Glucose (Glutose) 15 gm Q15M  PRN BUCCAL DECREASED GLUCOSE;  Start 1/31/19 at 


23:00


Famotidine (Pepcid) 20 mg Q12 PO  Last administered on 2/18/19at 08:29; Admin 


Dose 20 MG;  Start 1/31/19 at 22:30


Insulin Aspart (Novolog Insulin Pen) (Adult SC Insulin - Moder... WITH MEALS  


BEDTIME SC  Last administered on 2/17/19at 20:54; Admin Dose 1 UNIT;  Start 


1/31/19 at 22:30


Diagnostic Test (Pha) (Accu-Chek) 1 ea 02 XX  Last administered on 2/18/19at 


02:37; Admin Dose 1 EA;  Start 2/1/19 at 02:00


Linagliptin (Tradjenta) 5 mg DAILY PO  Last administered on 2/18/19at 07:50; 


Admin Dose 5 MG;  Start 2/1/19 at 09:00


Ondansetron HCl (Zofran Inj) 4 mg Q6H  PRN IV NAUSEA AND/OR VOMITING;  Start 


1/31/19 at 23:00


Senna (Senokot) 1 tab HS PO  Last administered on 2/17/19at 20:54; Admin Dose 1 


TAB;  Start 2/1/19 at 21:00


Magnesium Hydroxide (Milk Of Mag) 30 ml BID  PRN PO CONSTIPATION Last 


administered on 2/1/19at 20:48; Admin Dose 30 ML;  Start 2/1/19 at 00:00


Lactulose (Enulose) 20 gm DAILY  PRN PO CONSTIPATION Last administered on 


2/2/19at 08:40; Admin Dose 20 GM;  Start 2/1/19 at 00:00


Acetaminophen (Tylenol Tab) 650 mg Q4H  PRN PO PAIN Last administered on 


2/16/19at 16:01; Admin Dose 650 MG;  Start 2/1/19 at 00:00


Miscellaneous Information (Pending [facility] Order For Wound Care) This patient 


ha... PRN  PRN XX WOUND CARE;  Start 2/1/19 at 00:00


Bisacodyl (Dulcolax Supp) 10 mg DAILY  PRN [place] CONSTIPATION Last administered on 


2/3/19at 17:58; Admin Dose 10 MG;  Start 2/2/19 at 10:30


Sodium Biphosphate/ Sodium Phosphate (Fleet Enema) 133 ml DAILY  PRN [place] 


CONSTIPATION;  Start 2/2/19 at 10:30


Polyethylene Glycol (Miralax) 17 gm DAILY  PRN PO CONSTIPATION Last administered


on 2/2/19at 12:31; Admin Dose 17 GM;  Start 2/2/19 at 10:30


Docusate Sodium (Colace) 100 mg BID PO  Last administered on 2/18/19at 08:29; 


Admin Dose 100 MG;  Start 2/3/19 at 09:00


Celecoxib (Celebrex) 100 mg DAILY  PRN PO pain Last administered on 2/13/19at 


12:14; Admin Dose 100 MG;  Start 2/5/19 at 11:30


Insulin Glargine (Lantus) 6 units DAILY@2000 SC  Last administered on 2/17/19at 


20:53; Admin Dose 6 UNITS;  Start 2/6/19 at 20:00


Carbidopa/Levodopa (Sinemet (25/ 100)) 1.5 tab TID PO  Last administered on 


2/18/19at 12:06; Admin Dose 1.5 TAB;  Start 2/13/19 at 09:00


Metformin HCl (Glucophage) 500 mg WITH  BREAKFAST PO  Last administered on 


2/18/19at 07:50; Admin Dose 500 MG;  Start 2/17/19 at 07:35











PEGGY SAGASTUME MD, [facility]     Feb 18, 2019 16:43

## 2019-02-18 NOTE — PN
Date/Time of Note


Date/Time of Note


DATE: 2/18/19 


TIME: 16:06





Assessment/Plan


VTE Prophylaxis


Risk score (from Ns)>0 risk:  4


[place] [facility] [place]  [place]


Pharmacological prophylaxis:  NA/contraindicated


Pharm contraindication:  other





Lines/Catheters


IV Catheter Type (from [facility]):  Saline Lock


Urinary Cath still in place:  No





Assessment/Plan


Hospital Course


No acute events overnight, patient is awake alert, participates in physical 


therapy however requires assistance and direction since patient had concerns 


disease with bradykinesia and cogwheeling in addition to stroke with residual 


right-sided weakness.


Assessment/Plan


-Acute stroke with right-sided weakness.  Continue aspirin and Lipitor.  


Continue PT and OT.  S/p evaluation by  Dr. Braden  in neurology 


consultation.  


-Encephalopathy secondary to acute stroke


-Diabetes mellitus type 2 with hemoglobin A1c 8.1.  Continue metformin and 


Tradjenta, NovoLog per mild algorithm sliding scale.


-Parkinson's disease, continue Sinemet at increased dose.  Status post 


reevaluation by Dr. Braden in neurology consultation.


-S/p UTI, completed treatment with Rocephin





Further recommendations based on clinical course.  Plan of care discussed with 


Dr. Win.


Results 24hrs





Laboratory Tests


  Test
            2/17/19
17:40  2/17/19
20:52  2/18/19
01:38  2/18/19
07:43


  Bedside Glucose          104            203            111             82


  Test
            2/18/19
12:04  
              
              



  Bedside Glucose          123








Exam/Review of Systems


Exam


Vitals





Vital Signs


  Date      Temp  Pulse  Resp  B/P (MAP)   Pulse Ox  O2          O2 Flow    FiO2


Time                                                 Delivery    Rate


   2/18/19  97.8     79    18      116/59        95  Room Air


     14:00                           (78)








Intake and Output





2/17/19 2/17/19 2/18/19





1515:00


23:00


07:00





IntakeIntake Total


1200 ml


260 ml





OutputOutput Total


800 ml





BalanceBalance


400 ml


260 ml











Exam


Constitutional:  alert, oriented


Respiratory:  clear to auscultation


Cardiovascular:  nl pulse


Gastrointestinal:  soft, non-tender


Musculoskeletal:  nl extremities to inspection


Extremities:  normal pulses


Neurological:  other (R sided weakness)





Results


Results 24hrs





Laboratory Tests


  Test
            2/17/19
17:40  2/17/19
20:52  2/18/19
01:38  2/18/19
07:43


  Bedside Glucose          104            203            111             82


  Test
            2/18/19
12:04  
              
              



  Bedside Glucose          123








Medications


Medication





Current Medications


Aspirin (Halfprin) 81 mg DAILY PO  Last administered on 2/18/19at 08:29; Admin 


Dose 81 MG;  Start 2/1/19 at 09:00


Atorvastatin Calcium (Lipitor) 40 mg DAILY@21 PO  Last administered on 2/17/19at


20:54; Admin Dose 40 MG;  Start 1/31/19 at 22:30


Clonidine (Catapres) 0.1 mg Q6H  PRN PO ELEVATED BLOOD PRESSURE;  Start 1/31/19 


at 23:00


Miscellaneous Information 1 ea NOTE XX ;  Start 1/31/19 at 23:00


Glucose (Glutose) 15 gm Q15M  PRN PO DECREASED GLUCOSE;  Start 1/31/19 at 23:00


Glucose (Glutose) 22.5 gm Q15M  PRN PO DECREASED GLUCOSE;  Start 1/31/19 at 23


:00


Dextrose (D50w Syringe) 25 ml Q15M  PRN IV DECREASED GLUCOSE;  Start 1/31/19 at 


23:00


Dextrose (D50w Syringe) 50 ml Q15M  PRN IV DECREASED GLUCOSE;  Start 1/31/19 at 


23:00


Glucagon (Glucagen) 1 mg Q15M  PRN IM DECREASED GLUCOSE;  Start 1/31/19 at 23:00


Glucose (Glutose) 15 gm Q15M  PRN BUCCAL DECREASED GLUCOSE;  Start 1/31/19 at 


23:00


Famotidine (Pepcid) 20 mg Q12 PO  Last administered on 2/18/19at 08:29; Admin 


Dose 20 MG;  Start 1/31/19 at 22:30


Insulin Aspart (Novolog Insulin Pen) (Adult SC Insulin - Moder... WITH MEALS  


BEDTIME SC  Last administered on 2/17/19at 20:54; Admin Dose 1 UNIT;  Start 


1/31/19 at 22:30


Diagnostic Test (Pha) (Accu-Chek) 1 ea 02 XX  Last administered on 2/18/19at 


02:37; Admin Dose 1 EA;  Start 2/1/19 at 02:00


Linagliptin (Tradjenta) 5 mg DAILY PO  Last administered on 2/18/19at 07:50; 


Admin Dose 5 MG;  Start 2/1/19 at 09:00


Ondansetron HCl (Zofran Inj) 4 mg Q6H  PRN IV NAUSEA AND/OR VOMITING;  Start 


1/31/19 at 23:00


Senna (Senokot) 1 tab HS PO  Last administered on 2/17/19at 20:54; Admin Dose 1 


TAB;  Start 2/1/19 at 21:00


Magnesium Hydroxide (Milk Of Mag) 30 ml BID  PRN PO CONSTIPATION Last 


administered on 2/1/19at 20:48; Admin Dose 30 ML;  Start 2/1/19 at 00:00


Lactulose (Enulose) 20 gm DAILY  PRN PO CONSTIPATION Last administered on 


2/2/19at 08:40; Admin Dose 20 GM;  Start 2/1/19 at 00:00


Acetaminophen (Tylenol Tab) 650 mg Q4H  PRN PO PAIN Last administered on 


2/16/19at 16:01; Admin Dose 650 MG;  Start 2/1/19 at 00:00


Miscellaneous Information (Pending Santyl Order For Wound Care) This patient 


ha... PRN  PRN XX WOUND CARE;  Start 2/1/19 at 00:00


Bisacodyl (Dulcolax Supp) 10 mg DAILY  PRN [place] CONSTIPATION Last administered on 


2/3/19at 17:58; Admin Dose 10 MG;  Start 2/2/19 at 10:30


Sodium Biphosphate/ Sodium Phosphate (Fleet Enema) 133 ml DAILY  PRN [place] 


CONSTIPATION;  Start 2/2/19 at 10:30


Polyethylene Glycol (Miralax) 17 gm DAILY  PRN PO CONSTIPATION Last administered


on 2/2/19at 12:31; Admin Dose 17 GM;  Start 2/2/19 at 10:30


Docusate Sodium (Colace) 100 mg BID PO  Last administered on 2/18/19at 08:29; 


Admin Dose 100 MG;  Start 2/3/19 at 09:00


Celecoxib (Celebrex) 100 mg DAILY  PRN PO pain Last administered on 2/13/19at 


12:14; Admin Dose 100 MG;  Start 2/5/19 at 11:30


Insulin Glargine (Lantus) 6 units DAILY@2000 SC  Last administered on 2/17/19at 


20:53; Admin Dose 6 UNITS;  Start 2/6/19 at 20:00


Carbidopa/Levodopa (Sinemet (25/ 100)) 1.5 tab TID PO  Last administered on 


2/18/19at 12:06; Admin Dose 1.5 TAB;  Start 2/13/19 at 09:00


Metformin HCl (Glucophage) 500 mg WITH  BREAKFAST PO  Last administered on 


2/18/19at 07:50; Admin Dose 500 MG;  Start 2/17/19 at 07:35











LUCY CHEN             Feb 18, 2019 16:10

## 2019-02-19 VITALS — RESPIRATION RATE: 18 BRPM | DIASTOLIC BLOOD PRESSURE: 66 MMHG | SYSTOLIC BLOOD PRESSURE: 119 MMHG | HEART RATE: 69 BPM

## 2019-02-19 VITALS — SYSTOLIC BLOOD PRESSURE: 106 MMHG | DIASTOLIC BLOOD PRESSURE: 58 MMHG | RESPIRATION RATE: 18 BRPM | HEART RATE: 76 BPM

## 2019-02-19 VITALS — SYSTOLIC BLOOD PRESSURE: 132 MMHG | DIASTOLIC BLOOD PRESSURE: 63 MMHG | RESPIRATION RATE: 18 BRPM | HEART RATE: 60 BPM

## 2019-02-19 VITALS — DIASTOLIC BLOOD PRESSURE: 59 MMHG | HEART RATE: 70 BPM | RESPIRATION RATE: 18 BRPM | SYSTOLIC BLOOD PRESSURE: 120 MMHG

## 2019-02-19 RX ADMIN — CARBIDOPA AND LEVODOPA 1 TAB: 25; 100 TABLET ORAL at 21:31

## 2019-02-19 RX ADMIN — CARBIDOPA AND LEVODOPA SCH TAB: 25; 100 TABLET ORAL at 21:31

## 2019-02-19 RX ADMIN — ATORVASTATIN CALCIUM 1 MG: 40 TABLET, FILM COATED ORAL at 21:31

## 2019-02-19 RX ADMIN — DOCUSATE SODIUM SCH MG: 100 CAPSULE, LIQUID FILLED ORAL at 21:31

## 2019-02-19 RX ADMIN — INSULIN GLARGINE SCH UNITS: 100 INJECTION, SOLUTION SUBCUTANEOUS at 21:45

## 2019-02-19 RX ADMIN — CARBIDOPA AND LEVODOPA SCH TAB: 25; 100 TABLET ORAL at 12:56

## 2019-02-19 RX ADMIN — ASPIRIN 1 MG: 81 TABLET, COATED ORAL at 08:36

## 2019-02-19 RX ADMIN — SENNOSIDES SCH TAB: 8.6 TABLET, FILM COATED ORAL at 21:48

## 2019-02-19 RX ADMIN — CARBIDOPA AND LEVODOPA 1 TAB: 25; 100 TABLET ORAL at 08:37

## 2019-02-19 RX ADMIN — ASPIRIN SCH MG: 81 TABLET, COATED ORAL at 08:36

## 2019-02-19 RX ADMIN — ATORVASTATIN CALCIUM SCH MG: 40 TABLET, FILM COATED ORAL at 21:31

## 2019-02-19 RX ADMIN — DOCUSATE SODIUM 1 MG: 100 CAPSULE, LIQUID FILLED ORAL at 08:36

## 2019-02-19 RX ADMIN — FAMOTIDINE 1 MG: 20 TABLET ORAL at 08:36

## 2019-02-19 RX ADMIN — CARBIDOPA AND LEVODOPA SCH TAB: 25; 100 TABLET ORAL at 08:37

## 2019-02-19 RX ADMIN — DOCUSATE SODIUM SCH MG: 100 CAPSULE, LIQUID FILLED ORAL at 08:36

## 2019-02-19 RX ADMIN — FAMOTIDINE SCH MG: 20 TABLET ORAL at 08:36

## 2019-02-19 RX ADMIN — FAMOTIDINE 1 MG: 20 TABLET ORAL at 21:31

## 2019-02-19 RX ADMIN — INSULIN ASPART 1 UNIT: 100 INJECTION, SOLUTION INTRAVENOUS; SUBCUTANEOUS at 21:00

## 2019-02-19 RX ADMIN — DOCUSATE SODIUM 1 MG: 100 CAPSULE, LIQUID FILLED ORAL at 21:31

## 2019-02-19 RX ADMIN — INSULIN GLARGINE 1 UNITS: 100 INJECTION, SOLUTION SUBCUTANEOUS at 21:45

## 2019-02-19 RX ADMIN — SENNOSIDES 1 TAB: 8.6 TABLET, FILM COATED ORAL at 21:48

## 2019-02-19 RX ADMIN — INSULIN ASPART 1 UNIT: 100 INJECTION, SOLUTION INTRAVENOUS; SUBCUTANEOUS at 12:00

## 2019-02-19 RX ADMIN — INSULIN ASPART 1 UNIT: 100 INJECTION, SOLUTION INTRAVENOUS; SUBCUTANEOUS at 07:35

## 2019-02-19 RX ADMIN — LINAGLIPTIN 1 MG: 5 TABLET, FILM COATED ORAL at 07:59

## 2019-02-19 RX ADMIN — INSULIN ASPART 1 UNIT: 100 INJECTION, SOLUTION INTRAVENOUS; SUBCUTANEOUS at 17:24

## 2019-02-19 RX ADMIN — LINAGLIPTIN SCH MG: 5 TABLET, FILM COATED ORAL at 07:59

## 2019-02-19 RX ADMIN — CARBIDOPA AND LEVODOPA 1 TAB: 25; 100 TABLET ORAL at 12:56

## 2019-02-19 RX ADMIN — FAMOTIDINE SCH MG: 20 TABLET ORAL at 21:31

## 2019-02-19 NOTE — PN
Date/Time of Note


Date/Time of Note


DATE: 2/19/19 


TIME: 16:15





Assessment/Plan


VTE Prophylaxis


Risk score (from Ns)>0 risk:  4


SCD applied (from [facility]):  Yes


Pharmacological prophylaxis:  NA/contraindicated


Pharm contraindication:  other





Lines/Catheters


IV Catheter Type (from [facility]):  Saline Lock


Urinary Cath still in place:  No





Assessment/Plan


Hospital Course


Patient remains stable, blood sugar is well controlled, pt is able to work with 


PT.


Assessment/Plan


-Acute stroke with right-sided weakness.  Continue aspirin and Lipitor.  


Continue PT and OT.  S/p evaluation by  Dr. Braden  in neurology 


consultation.  


-Encephalopathy secondary to acute stroke


-Diabetes mellitus type 2 with hemoglobin A1c 8.1.  Continue metformin and 


Tradjenta, NovoLog per mild algorithm sliding scale.


-Parkinson's disease, continue Sinemet at increased dose.  Status post 


reevaluation by Dr. Braden in neurology consultation.


-S/p UTI, completed treatment with Rocephin





Further recommendations based on clinical course.  Plan of care discussed with 


Dr. Win.


Results 24hrs





Laboratory Tests


  Test
            2/18/19
17:39  2/18/19
20:04  2/19/19
07:49  2/19/19
12:04


  Bedside Glucose          144            173            114            128








Exam/Review of Systems


Exam


Vitals





Vital Signs


  Date      Temp  Pulse  Resp  B/P (MAP)   Pulse Ox  O2          O2 Flow    FiO2


Time                                                 Delivery    Rate


   2/19/19  97.8     60    18      132/63        97  Room Air


     07:00                           (86)








Intake and Output





2/18/19 2/18/19 2/19/19





1515:00


23:00


07:00





IntakeIntake Total


150 ml


640 ml





BalanceBalance


150 ml


640 ml











Exam


Constitutional:  alert, oriented


Respiratory:  clear to auscultation


Cardiovascular:  nl pulse


Gastrointestinal:  soft, non-tender


Musculoskeletal:  nl extremities to inspection


Extremities:  normal pulses


Neurological:  other (R sided weakness)





Results


Results 24hrs





Laboratory Tests


  Test
            2/18/19
17:39  2/18/19
20:04  2/19/19
07:49  2/19/19
12:04


  Bedside Glucose          144            173            114            128








Medications


Medication





Current Medications


Aspirin (Halfprin) 81 mg DAILY PO  Last administered on 2/19/19at 08:36; Admin 


Dose 81 MG;  Start 2/1/19 at 09:00


Atorvastatin Calcium (Lipitor) 40 mg DAILY@21 PO  Last administered on 2/18/19at


21:21; Admin Dose 40 MG;  Start 1/31/19 at 22:30


Clonidine (Catapres) 0.1 mg Q6H  PRN PO ELEVATED BLOOD PRESSURE;  Start 1/31/19 


at 23:00


Miscellaneous Information 1 ea NOTE XX ;  Start 1/31/19 at 23:00


Glucose (Glutose) 15 gm Q15M  PRN PO DECREASED GLUCOSE;  Start 1/31/19 at 23:00


Glucose (Glutose) 22.5 gm Q15M  PRN PO DECREASED GLUCOSE;  Start 1/31/19 at 


23:00


Dextrose (D50w Syringe) 25 ml Q15M  PRN IV DECREASED GLUCOSE;  Start 1/31/19 at 


23:00


Dextrose (D50w Syringe) 50 ml Q15M  PRN IV DECREASED GLUCOSE;  Start 1/31/19 at 


23:00


Glucagon (Glucagen) 1 mg Q15M  PRN IM DECREASED GLUCOSE;  Start 1/31/19 at 23:00


Glucose (Glutose) 15 gm Q15M  PRN BUCCAL DECREASED GLUCOSE;  Start 1/31/19 at 


23:00


Famotidine (Pepcid) 20 mg Q12 PO  Last administered on 2/19/19at 08:36; Admin 


Dose 20 MG;  Start 1/31/19 at 22:30


Insulin Aspart (Novolog Insulin Pen) (Adult SC Insulin - Moder... WITH MEALS  


BEDTIME SC  Last administered on 2/18/19at 17:41; Admin Dose 2 UNIT;  Start 


1/31/19 at 22:30


Diagnostic Test (Pha) (Accu-Chek) 1 ea 02 XX  Last administered on 2/18/19at 


02:37; Admin Dose 1 EA;  Start 2/1/19 at 02:00


Linagliptin (Tradjenta) 5 mg DAILY PO  Last administered on 2/19/19at 07:59; 


Admin Dose 5 MG;  Start 2/1/19 at 09:00


Ondansetron HCl (Zofran Inj) 4 mg Q6H  PRN IV NAUSEA AND/OR VOMITING;  Start 


1/31/19 at 23:00


Senna (Senokot) 1 tab HS PO  Last administered on 2/17/19at 20:54; Admin Dose 1 


TAB;  Start 2/1/19 at 21:00


Magnesium Hydroxide (Milk Of Mag) 30 ml BID  PRN PO CONSTIPATION Last 


administered on 2/1/19at 20:48; Admin Dose 30 ML;  Start 2/1/19 at 00:00


Lactulose (Enulose) 20 gm DAILY  PRN PO CONSTIPATION Last administered on 


2/2/19at 08:40; Admin Dose 20 GM;  Start 2/1/19 at 00:00


Acetaminophen (Tylenol Tab) 650 mg Q4H  PRN PO PAIN Last administered on 


2/16/19at 16:01; Admin Dose 650 MG;  Start 2/1/19 at 00:00


Miscellaneous Information (Pending Santyl Order For Wound Care) This patient 


ha... PRN  PRN XX WOUND CARE;  Start 2/1/19 at 00:00


Bisacodyl (Dulcolax Supp) 10 mg DAILY  PRN [place] CONSTIPATION Last administered on 


2/3/19at 17:58; Admin Dose 10 MG;  Start 2/2/19 at 10:30


Sodium Biphosphate/ Sodium Phosphate (Fleet Enema) 133 ml DAILY  PRN [place] 


CONSTIPATION;  Start 2/2/19 at 10:30


Polyethylene Glycol (Miralax) 17 gm DAILY  PRN PO CONSTIPATION Last administered


on 2/2/19at 12:31; Admin Dose 17 GM;  Start 2/2/19 at 10:30


Docusate Sodium (Colace) 100 mg BID PO  Last administered on 2/19/19at 08:36; 


Admin Dose 100 MG;  Start 2/3/19 at 09:00


Celecoxib (Celebrex) 100 mg DAILY  PRN PO pain Last administered on 2/13/19at 


12:14; Admin Dose 100 MG;  Start 2/5/19 at 11:30


Insulin Glargine (Lantus) 6 units DAILY@2000 SC  Last administered on 2/18/19at 


20:07; Admin Dose 6 UNITS;  Start 2/6/19 at 20:00


Carbidopa/Levodopa (Sinemet (25/ 100)) 1.5 tab TID PO  Last administered on 


2/19/19at 12:56; Admin Dose 1.5 TAB;  Start 2/13/19 at 09:00


Metformin HCl (Glucophage) 500 mg WITH  BREAKFAST PO  Last administered on 


2/19/19at 07:51; Admin Dose 500 MG;  Start 2/17/19 at 07:35











RADCHENKO,LUCY             Feb 19, 2019 16:15

## 2019-02-19 NOTE — PN
Date/Time of Note


Date/Time of Note


DATE: 2/19/19 


TIME: 11:21





Subjective


Patient without new complaints





Objective





Vital Signs


  Date      Temp  Pulse  Resp  B/P (MAP)   Pulse Ox  O2          O2 Flow    FiO2


Time                                                 Delivery    Rate


   2/19/19  97.8     60    18      132/63        97  Room Air


     07:00                           (86)








Intake and Output





2/18/19 2/18/19 2/19/19





1515:00


23:00


07:00





IntakeIntake Total


150 ml


640 ml





BalanceBalance


150 ml


640 ml











Exam


pulm-cta


min transfer


min ambulation 30 feet





Results/Medications


Results 24 hrs





Laboratory Tests


  Test
            2/18/19
12:04  2/18/19
17:39  2/18/19
20:04  2/19/19
07:49


  Bedside Glucose          123            144            173            114





Medications





Current Medications


Aspirin (Halfprin) 81 mg DAILY PO  Last administered on 2/19/19at 08:36; Admin 


Dose 81 MG;  Start 2/1/19 at 09:00


Atorvastatin Calcium (Lipitor) 40 mg DAILY@21 PO  Last administered on 2/18/19at


21:21; Admin Dose 40 MG;  Start 1/31/19 at 22:30


Clonidine (Catapres) 0.1 mg Q6H  PRN PO ELEVATED BLOOD PRESSURE;  Start 1/31/19 


at 23:00


Miscellaneous Information 1 ea NOTE XX ;  Start 1/31/19 at 23:00


Glucose (Glutose) 15 gm Q15M  PRN PO DECREASED GLUCOSE;  Start 1/31/19 at 23:00


Glucose (Glutose) 22.5 gm Q15M  PRN PO DECREASED GLUCOSE;  Start 1/31/19 at 


23:00


Dextrose (D50w Syringe) 25 ml Q15M  PRN IV DECREASED GLUCOSE;  Start 1/31/19 at 


23:00


Dextrose (D50w Syringe) 50 ml Q15M  PRN IV DECREASED GLUCOSE;  Start 1/31/19 at 


23:00


Glucagon (Glucagen) 1 mg Q15M  PRN IM DECREASED GLUCOSE;  Start 1/31/19 at 23:00


Glucose (Glutose) 15 gm Q15M  PRN BUCCAL DECREASED GLUCOSE;  Start 1/31/19 at 


23:00


Famotidine (Pepcid) 20 mg Q12 PO  Last administered on 2/19/19at 08:36; Admin 


Dose 20 MG;  Start 1/31/19 at 22:30


Insulin Aspart (Novolog Insulin Pen) (Adult SC Insulin - Moder... WITH MEALS  


BEDTIME SC  Last administered on 2/18/19at 17:41; Admin Dose 2 UNIT;  Start 1/3


1/19 at 22:30


Diagnostic Test (Pha) (Accu-Chek) 1 ea 02 XX  Last administered on 2/18/19at 


02:37; Admin Dose 1 EA;  Start 2/1/19 at 02:00


Linagliptin (Tradjenta) 5 mg DAILY PO  Last administered on 2/19/19at 07:59; 


Admin Dose 5 MG;  Start 2/1/19 at 09:00


Ondansetron HCl (Zofran Inj) 4 mg Q6H  PRN IV NAUSEA AND/OR VOMITING;  Start 


1/31/19 at 23:00


Senna (Senokot) 1 tab HS PO  Last administered on 2/17/19at 20:54; Admin Dose 1 


TAB;  Start 2/1/19 at 21:00


Magnesium Hydroxide (Milk Of Mag) 30 ml BID  PRN PO CONSTIPATION Last 


administered on 2/1/19at 20:48; Admin Dose 30 ML;  Start 2/1/19 at 00:00


Lactulose (Enulose) 20 gm DAILY  PRN PO CONSTIPATION Last administered on 


2/2/19at 08:40; Admin Dose 20 GM;  Start 2/1/19 at 00:00


Acetaminophen (Tylenol Tab) 650 mg Q4H  PRN PO PAIN Last administered on 


2/16/19at 16:01; Admin Dose 650 MG;  Start 2/1/19 at 00:00


Miscellaneous Information (Pending [facility] Order For Wound Care) This patient 


ha... PRN  PRN XX WOUND CARE;  Start 2/1/19 at 00:00


Bisacodyl (Dulcolax Supp) 10 mg DAILY  PRN [place] CONSTIPATION Last administered on 


2/3/19at 17:58; Admin Dose 10 MG;  Start 2/2/19 at 10:30


Sodium Biphosphate/ Sodium Phosphate (Fleet Enema) 133 ml DAILY  PRN [place] 


CONSTIPATION;  Start 2/2/19 at 10:30


Polyethylene Glycol (Miralax) 17 gm DAILY  PRN PO CONSTIPATION Last administered


on 2/2/19at 12:31; Admin Dose 17 GM;  Start 2/2/19 at 10:30


Docusate Sodium (Colace) 100 mg BID PO  Last administered on 2/19/19at 08:36; 


Admin Dose 100 MG;  Start 2/3/19 at 09:00


Celecoxib (Celebrex) 100 mg DAILY  PRN PO pain Last administered on 2/13/19at 


12:14; Admin Dose 100 MG;  Start 2/5/19 at 11:30


Insulin Glargine (Lantus) 6 units DAILY@2000 SC  Last administered on 2/18/19at 


20:07; Admin Dose 6 UNITS;  Start 2/6/19 at 20:00


Carbidopa/Levodopa (Sinemet (25/ 100)) 1.5 tab TID PO  Last administered on 


2/19/19at 08:37; Admin Dose 1.5 TAB;  Start 2/13/19 at 09:00


Metformin HCl (Glucophage) 500 mg WITH  BREAKFAST PO  Last administered on 


2/19/19at 07:51; Admin Dose 500 MG;  Start 2/17/19 at 07:35





Assessment/Plan


Additional Assessment/Plan


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


sided weakness; Parkinsons


   Continue rehab program. Activity tolerance improving.


Diabetes mellitus.


Bilateral common carotid artery stenosis.


Urinary tract infection.


Dysphagia











MARS POMPA MD             Feb 19, 2019 12:59

## 2019-02-20 VITALS — RESPIRATION RATE: 18 BRPM | SYSTOLIC BLOOD PRESSURE: 124 MMHG | DIASTOLIC BLOOD PRESSURE: 58 MMHG | HEART RATE: 74 BPM

## 2019-02-20 VITALS — HEART RATE: 77 BPM | SYSTOLIC BLOOD PRESSURE: 111 MMHG | DIASTOLIC BLOOD PRESSURE: 56 MMHG | RESPIRATION RATE: 18 BRPM

## 2019-02-20 VITALS — HEART RATE: 74 BPM | SYSTOLIC BLOOD PRESSURE: 125 MMHG | DIASTOLIC BLOOD PRESSURE: 65 MMHG | RESPIRATION RATE: 18 BRPM

## 2019-02-20 VITALS — HEART RATE: 66 BPM | SYSTOLIC BLOOD PRESSURE: 142 MMHG | DIASTOLIC BLOOD PRESSURE: 67 MMHG | RESPIRATION RATE: 18 BRPM

## 2019-02-20 RX ADMIN — DOCUSATE SODIUM 1 MG: 100 CAPSULE, LIQUID FILLED ORAL at 08:18

## 2019-02-20 RX ADMIN — ATORVASTATIN CALCIUM 1 MG: 40 TABLET, FILM COATED ORAL at 20:15

## 2019-02-20 RX ADMIN — SENNOSIDES SCH TAB: 8.6 TABLET, FILM COATED ORAL at 20:15

## 2019-02-20 RX ADMIN — ASPIRIN 1 MG: 81 TABLET, COATED ORAL at 08:14

## 2019-02-20 RX ADMIN — ATORVASTATIN CALCIUM SCH MG: 40 TABLET, FILM COATED ORAL at 20:15

## 2019-02-20 RX ADMIN — INSULIN ASPART 1 UNIT: 100 INJECTION, SOLUTION INTRAVENOUS; SUBCUTANEOUS at 07:35

## 2019-02-20 RX ADMIN — CARBIDOPA AND LEVODOPA SCH TAB: 25; 100 TABLET ORAL at 12:32

## 2019-02-20 RX ADMIN — ASPIRIN SCH MG: 81 TABLET, COATED ORAL at 08:14

## 2019-02-20 RX ADMIN — SENNOSIDES 1 TAB: 8.6 TABLET, FILM COATED ORAL at 20:15

## 2019-02-20 RX ADMIN — CARBIDOPA AND LEVODOPA 1 TAB: 25; 100 TABLET ORAL at 20:15

## 2019-02-20 RX ADMIN — CARBIDOPA AND LEVODOPA 1 TAB: 25; 100 TABLET ORAL at 12:32

## 2019-02-20 RX ADMIN — CARBIDOPA AND LEVODOPA SCH TAB: 25; 100 TABLET ORAL at 20:15

## 2019-02-20 RX ADMIN — INSULIN GLARGINE 1 UNITS: 100 INJECTION, SOLUTION SUBCUTANEOUS at 20:17

## 2019-02-20 RX ADMIN — CARBIDOPA AND LEVODOPA 1 TAB: 25; 100 TABLET ORAL at 08:14

## 2019-02-20 RX ADMIN — FAMOTIDINE SCH MG: 20 TABLET ORAL at 20:15

## 2019-02-20 RX ADMIN — FAMOTIDINE SCH MG: 20 TABLET ORAL at 08:14

## 2019-02-20 RX ADMIN — DOCUSATE SODIUM SCH MG: 100 CAPSULE, LIQUID FILLED ORAL at 08:18

## 2019-02-20 RX ADMIN — DOCUSATE SODIUM 1 MG: 100 CAPSULE, LIQUID FILLED ORAL at 20:15

## 2019-02-20 RX ADMIN — INSULIN ASPART 1 UNIT: 100 INJECTION, SOLUTION INTRAVENOUS; SUBCUTANEOUS at 11:55

## 2019-02-20 RX ADMIN — INSULIN ASPART 1 UNIT: 100 INJECTION, SOLUTION INTRAVENOUS; SUBCUTANEOUS at 17:26

## 2019-02-20 RX ADMIN — FAMOTIDINE 1 MG: 20 TABLET ORAL at 08:14

## 2019-02-20 RX ADMIN — DOCUSATE SODIUM SCH MG: 100 CAPSULE, LIQUID FILLED ORAL at 20:15

## 2019-02-20 RX ADMIN — LINAGLIPTIN 1 MG: 5 TABLET, FILM COATED ORAL at 08:14

## 2019-02-20 RX ADMIN — CARBIDOPA AND LEVODOPA SCH TAB: 25; 100 TABLET ORAL at 08:14

## 2019-02-20 RX ADMIN — FAMOTIDINE 1 MG: 20 TABLET ORAL at 20:15

## 2019-02-20 RX ADMIN — LINAGLIPTIN SCH MG: 5 TABLET, FILM COATED ORAL at 08:14

## 2019-02-20 RX ADMIN — INSULIN GLARGINE SCH UNITS: 100 INJECTION, SOLUTION SUBCUTANEOUS at 20:17

## 2019-02-20 RX ADMIN — INSULIN ASPART 1 UNIT: 100 INJECTION, SOLUTION INTRAVENOUS; SUBCUTANEOUS at 20:21

## 2019-02-20 NOTE — PN
Date/Time of Note


Date/Time of Note


DATE: 2/20/19 


TIME: 13:31





Objective





Vital Signs


  Date      Temp  Pulse  Resp  B/P (MAP)   Pulse Ox  O2          O2 Flow    FiO2


Time                                                 Delivery    Rate


   2/20/19  98.2     66    18      142/67        95  Room Air


     08:30                           (92)








Intake and Output





2/19/19 2/19/19 2/20/19





1515:00


23:00


07:00





IntakeIntake Total


1150 ml





OutputOutput Total


800 ml





BalanceBalance


350 ml











Exam








INTERDISCIPLINARY TEAM CONFERENCE





Physical Exam:


Pulm- cta


Abd- soft








BOWEL- Cont


BLADDER-Cont 


SKIN- intact











OT-     DRESSING- sba UB /max LB


   BATHING- sba UB /max LB


   TOILETING- mod/max





PT-      BED MOBILITY- min 


   TRANSFERS- min


   AMBULATION- min 30 feet


   


SPEECH- 


COGNITION- min


Dysphagia-   regular diet








A/P-


Interdisciplinary team conference held today. Please see interdisciplinary 


sheet.  Working toward d.c. on 2/22 with post discharge follow up of physical 


therapy, occupational therapy.





Results/Medications


Results 24 hrs





Laboratory Tests


  Test
            2/19/19
17:24  2/19/19
21:30  2/20/19
07:59  2/20/19
11:45


  Bedside Glucose          118            136             81            147





Medications





Current Medications


Aspirin (Halfprin) 81 mg DAILY PO  Last administered on 2/20/19at 08:14; Admin 


Dose 81 MG;  Start 2/1/19 at 09:00


Atorvastatin Calcium (Lipitor) 40 mg DAILY@21 PO  Last administered on 2/19/19at


21:31; Admin Dose 40 MG;  Start 1/31/19 at 22:30


Clonidine (Catapres) 0.1 mg Q6H  PRN PO ELEVATED BLOOD PRESSURE;  Start 1/31/19 


at 23:00


Miscellaneous Information 1 ea NOTE XX ;  Start 1/31/19 at 23:00


Glucose (Glutose) 15 gm Q15M  PRN PO DECREASED GLUCOSE;  Start 1/31/19 at 23:00


Glucose (Glutose) 22.5 gm Q15M  PRN PO DECREASED GLUCOSE;  Start 1/31/19 at 


23:00


Dextrose (D50w Syringe) 25 ml Q15M  PRN IV DECREASED GLUCOSE;  Start 1/31/19 at 


23:00


Dextrose (D50w Syringe) 50 ml Q15M  PRN IV DECREASED GLUCOSE;  Start 1/31/19 at 


23:00


Glucagon (Glucagen) 1 mg Q15M  PRN IM DECREASED GLUCOSE;  Start 1/31/19 at 23:00


Glucose (Glutose) 15 gm Q15M  PRN BUCCAL DECREASED GLUCOSE;  Start 1/31/19 at 


23:00


Famotidine (Pepcid) 20 mg Q12 PO  Last administered on 2/20/19at 08:14; Admin 


Dose 20 MG;  Start 1/31/19 at 22:30


Insulin Aspart (Novolog Insulin Pen) (Adult SC Insulin - Moder... WITH MEALS  


BEDTIME SC  Last administered on 2/20/19at 11:55; Admin Dose 2 UNIT;  Start 


1/31/19 at 22:30


Diagnostic Test (Pha) (Accu-Chek) 1 ea 02 XX  Last administered on 2/18/19at 


02:37; Admin Dose 1 EA;  Start 2/1/19 at 02:00


Linagliptin (Tradjenta) 5 mg DAILY PO  Last administered on 2/20/19at 08:14; 


Admin Dose 5 MG;  Start 2/1/19 at 09:00


Ondansetron HCl (Zofran Inj) 4 mg Q6H  PRN IV NAUSEA AND/OR VOMITING;  Start 


1/31/19 at 23:00


Senna (Senokot) 1 tab HS PO  Last administered on 2/19/19at 21:48; Admin Dose 1 


TAB;  Start 2/1/19 at 21:00


Magnesium Hydroxide (Milk Of Mag) 30 ml BID  PRN PO CONSTIPATION Last 


administered on 2/1/19at 20:48; Admin Dose 30 ML;  Start 2/1/19 at 00:00


Lactulose (Enulose) 20 gm DAILY  PRN PO CONSTIPATION Last administered on 


2/2/19at 08:40; Admin Dose 20 GM;  Start 2/1/19 at 00:00


Acetaminophen (Tylenol Tab) 650 mg Q4H  PRN PO PAIN Last administered on 


2/16/19at 16:01; Admin Dose 650 MG;  Start 2/1/19 at 00:00


Miscellaneous Information (Pending Santyl Order For Wound Care) This patient 


ha... PRN  PRN XX WOUND CARE;  Start 2/1/19 at 00:00


Bisacodyl (Dulcolax Supp) 10 mg DAILY  PRN [place] CONSTIPATION Last administered on 


2/3/19at 17:58; Admin Dose 10 MG;  Start 2/2/19 at 10:30


Sodium Biphosphate/ Sodium Phosphate (Fleet Enema) 133 ml DAILY  PRN [place] 


CONSTIPATION;  Start 2/2/19 at 10:30


Polyethylene Glycol (Miralax) 17 gm DAILY  PRN PO CONSTIPATION Last administered


on 2/2/19at 12:31; Admin Dose 17 GM;  Start 2/2/19 at 10:30


Docusate Sodium (Colace) 100 mg BID PO  Last administered on 2/19/19at 21:31; 


Admin Dose 100 MG;  Start 2/3/19 at 09:00


Celecoxib (Celebrex) 100 mg DAILY  PRN PO pain Last administered on 2/13/19at 


12:14; Admin Dose 100 MG;  Start 2/5/19 at 11:30


Insulin Glargine (Lantus) 6 units DAILY@2000 SC  Last administered on 2/19/19at 


21:45; Admin Dose 6 UNITS;  Start 2/6/19 at 20:00


Carbidopa/Levodopa (Sinemet (25/ 100)) 1.5 tab TID PO  Last administered on 


2/20/19at 12:32; Admin Dose 1.5 TAB;  Start 2/13/19 at 09:00


Metformin HCl (Glucophage) 500 mg WITH  BREAKFAST PO  Last administered on 


2/20/19at 08:14; Admin Dose 500 MG;  Start 2/17/19 at 07:35











MARS POMPA MD             Feb 20, 2019 13:32

## 2019-02-20 NOTE — PN
Date/Time of Note


Date/Time of Note


DATE: 2/20/19 


TIME: 15:35





Assessment/Plan


VTE Prophylaxis


Risk score (from Ns)>0 risk:  4


SCD applied (from [facility] [place] [facility]):  Yes


Pharmacological prophylaxis:  NA/contraindicated


Pharm contraindication:  other





Lines/Catheters


IV Catheter Type (from [facility]):  Saline Lock


Urinary Cath still in place:  No





Assessment/Plan


Hospital Course


Continues to work with physical therapy still requiring 2 person assist and 


directions, patient remains hemodynamically stable, afebrile.


Assessment/Plan


-Acute stroke with right-sided weakness.  Continue aspirin and Lipitor.  


Continue PT and OT.  S/p evaluation by  Dr. Braden  in neurology 


consultation.  


-Encephalopathy secondary to acute stroke


-Diabetes mellitus type 2 with hemoglobin A1c 8.1.  Continue metformin and 


Tradjenta, NovoLog per mild algorithm sliding scale.


-Parkinson's disease, continue Sinemet at increased dose.  Status post 


reevaluation by Dr. Braden in neurology consultation.


-S/p UTI, completed treatment with Rocephin





Further recommendations based on clinical course.  Plan of care discussed with 


Dr. Win.


Results 24hrs





Laboratory Tests


  Test
            2/19/19
17:24  2/19/19
21:30  2/20/19
07:59  2/20/19
11:45


  Bedside Glucose          118            136             81            147








Exam/Review of Systems


Exam


Vitals





Vital Signs


  Date      Temp  Pulse  Resp  B/P (MAP)   Pulse Ox  O2          O2 Flow    FiO2


Time                                                 Delivery    Rate


   2/20/19  98.3     77    18      111/56        93  Room Air


     14:24                           (74)








Intake and Output





2/19/19 2/19/19 2/20/19





1515:00


23:00


07:00





IntakeIntake Total


1150 ml





OutputOutput Total


800 ml





BalanceBalance


350 ml











Exam


Constitutional:  alert, oriented


Respiratory:  clear to auscultation


Cardiovascular:  nl pulse


Gastrointestinal:  soft, non-tender


Musculoskeletal:  nl extremities to inspection


Extremities:  normal pulses


Neurological:  other (R sided weakness)





Results


Results 24hrs





Laboratory Tests


  Test
            2/19/19
17:24  2/19/19
21:30  2/20/19
07:59  2/20/19
11:45


  Bedside Glucose          118            136             81            147








Medications


Medication





Current Medications


Aspirin (Halfprin) 81 mg DAILY PO  Last administered on 2/20/19at 08:14; Admin 


Dose 81 MG;  Start 2/1/19 at 09:00


Atorvastatin Calcium (Lipitor) 40 mg DAILY@21 PO  Last administered on 2/19/19at


21:31; Admin Dose 40 MG;  Start 1/31/19 at 22:30


Clonidine (Catapres) 0.1 mg Q6H  PRN PO ELEVATED BLOOD PRESSURE;  Start 1/31/19 


at 23:00


Miscellaneous Information 1 ea NOTE XX ;  Start 1/31/19 at 23:00


Glucose (Glutose) 15 gm Q15M  PRN PO DECREASED GLUCOSE;  Start 1/31/19 at 23:00


Glucose (Glutose) 22.5 gm Q15M  PRN PO DECREASED GLUCOSE;  Start 1/31/19 at 


23:00


Dextrose (D50w Syringe) 25 ml Q15M  PRN IV DECREASED GLUCOSE;  Start 1/31/19 at 


23:00


Dextrose (D50w Syringe) 50 ml Q15M  PRN IV DECREASED GLUCOSE;  Start 1/31/19 at 


23:00


Glucagon (Glucagen) 1 mg Q15M  PRN IM DECREASED GLUCOSE;  Start 1/31/19 at 23:00


Glucose (Glutose) 15 gm Q15M  PRN BUCCAL DECREASED GLUCOSE;  Start 1/31/19 at 


23:00


Famotidine (Pepcid) 20 mg Q12 PO  Last administered on 2/20/19at 08:14; Admin 


Dose 20 MG;  Start 1/31/19 at 22:30


Insulin Aspart (Novolog Insulin Pen) (Adult SC Insulin - Moder... WITH MEALS  


BEDTIME SC  Last administered on 2/20/19at 11:55; Admin Dose 2 UNIT;  Start 


1/31/19 at 22:30


Diagnostic Test (Pha) (Accu-Chek) 1 ea 02 XX  Last administered on 2/18/19at 


02:37; Admin Dose 1 EA;  Start 2/1/19 at 02:00


Linagliptin (Tradjenta) 5 mg DAILY PO  Last administered on 2/20/19at 08:14; 


Admin Dose 5 MG;  Start 2/1/19 at 09:00


Ondansetron HCl (Zofran Inj) 4 mg Q6H  PRN IV NAUSEA AND/OR VOMITING;  Start 


1/31/19 at 23:00


Senna (Senokot) 1 tab HS PO  Last administered on 2/19/19at 21:48; Admin Dose 1 


TAB;  Start 2/1/19 at 21:00


Magnesium Hydroxide (Milk Of Mag) 30 ml BID  PRN PO CONSTIPATION Last 


administered on 2/1/19at 20:48; Admin Dose 30 ML;  Start 2/1/19 at 00:00


Lactulose (Enulose) 20 gm DAILY  PRN PO CONSTIPATION Last administered on 


2/2/19at 08:40; Admin Dose 20 GM;  Start 2/1/19 at 00:00


Acetaminophen (Tylenol Tab) 650 mg Q4H  PRN PO PAIN Last administered on 


2/16/19at 16:01; Admin Dose 650 MG;  Start 2/1/19 at 00:00


Miscellaneous Information (Pending Santyl Order For Wound Care) This patient 


ha... PRN  PRN XX WOUND CARE;  Start 2/1/19 at 00:00


Bisacodyl (Dulcolax Supp) 10 mg DAILY  PRN [place] CONSTIPATION Last administered on 


2/3/19at 17:58; Admin Dose 10 MG;  Start 2/2/19 at 10:30


Sodium Biphosphate/ Sodium Phosphate (Fleet Enema) 133 ml DAILY  PRN [place] 


CONSTIPATION;  Start 2/2/19 at 10:30


Polyethylene Glycol (Miralax) 17 gm DAILY  PRN PO CONSTIPATION Last administered


on 2/2/19at 12:31; Admin Dose 17 GM;  Start 2/2/19 at 10:30


Docusate Sodium (Colace) 100 mg BID PO  Last administered on 2/19/19at 21:31; 


Admin Dose 100 MG;  Start 2/3/19 at 09:00


Celecoxib (Celebrex) 100 mg DAILY  PRN PO pain Last administered on 2/13/19at 


12:14; Admin Dose 100 MG;  Start 2/5/19 at 11:30


Insulin Glargine (Lantus) 6 units DAILY@2000 SC  Last administered on 2/19/19at 


21:45; Admin Dose 6 UNITS;  Start 2/6/19 at 20:00


Carbidopa/Levodopa (Sinemet (25/ 100)) 1.5 tab TID PO  Last administered on 


2/20/19at 12:32; Admin Dose 1.5 TAB;  Start 2/13/19 at 09:00


Metformin HCl (Glucophage) 500 mg WITH  BREAKFAST PO  Last administered on 


2/20/19at 08:14; Admin Dose 500 MG;  Start 2/17/19 at 07:35











LUCY CHEN             Feb 20, 2019 15:38

## 2019-02-20 NOTE — PN
DATE:  02/20/2019

 

 

PSYCHOLOGY -- INDIVIDUAL SESSION -- [id number]

 

This is a followup on a patient who was seen last week.  The patient has made progress in the program
.  The patient is preparing to be discharged.  The patient is looking forward to going home.  The pat
ient is getting clearer.  He still again has some confusion, but at the present time and has made pro
ruslan in the program.  The patient is motivated to get better and does want to return home and be wit
h his wife.

 

 

Dictated By: ANUP PALUMBO PHD

 

RK/NTS

DD:    02/20/2019 18:02:20

DT:    02/20/2019 19:28:12

Conf#: [id number]

DID#:  [id number]

CC: MARS POMPA MD;*End*

## 2019-02-21 VITALS — HEART RATE: 84 BPM | SYSTOLIC BLOOD PRESSURE: 99 MMHG | RESPIRATION RATE: 18 BRPM | DIASTOLIC BLOOD PRESSURE: 55 MMHG

## 2019-02-21 VITALS — HEART RATE: 76 BPM | RESPIRATION RATE: 18 BRPM | SYSTOLIC BLOOD PRESSURE: 110 MMHG | DIASTOLIC BLOOD PRESSURE: 62 MMHG

## 2019-02-21 VITALS — RESPIRATION RATE: 18 BRPM | HEART RATE: 70 BPM | DIASTOLIC BLOOD PRESSURE: 60 MMHG | SYSTOLIC BLOOD PRESSURE: 119 MMHG

## 2019-02-21 VITALS — RESPIRATION RATE: 18 BRPM | SYSTOLIC BLOOD PRESSURE: 135 MMHG | HEART RATE: 66 BPM | DIASTOLIC BLOOD PRESSURE: 69 MMHG

## 2019-02-21 RX ADMIN — LACTULOSE 1 GM: 20 SOLUTION ORAL at 17:58

## 2019-02-21 RX ADMIN — LINAGLIPTIN SCH MG: 5 TABLET, FILM COATED ORAL at 08:28

## 2019-02-21 RX ADMIN — FAMOTIDINE 1 MG: 20 TABLET ORAL at 20:12

## 2019-02-21 RX ADMIN — SENNOSIDES SCH TAB: 8.6 TABLET, FILM COATED ORAL at 20:12

## 2019-02-21 RX ADMIN — LACTULOSE PRN GM: 10 SOLUTION ORAL at 17:58

## 2019-02-21 RX ADMIN — CARBIDOPA AND LEVODOPA SCH TAB: 25; 100 TABLET ORAL at 20:12

## 2019-02-21 RX ADMIN — ASPIRIN SCH MG: 81 TABLET, COATED ORAL at 08:28

## 2019-02-21 RX ADMIN — SENNOSIDES 1 TAB: 8.6 TABLET, FILM COATED ORAL at 20:12

## 2019-02-21 RX ADMIN — ATORVASTATIN CALCIUM 1 MG: 40 TABLET, FILM COATED ORAL at 20:12

## 2019-02-21 RX ADMIN — DOCUSATE SODIUM SCH MG: 100 CAPSULE, LIQUID FILLED ORAL at 08:27

## 2019-02-21 RX ADMIN — FAMOTIDINE SCH MG: 20 TABLET ORAL at 08:27

## 2019-02-21 RX ADMIN — ASPIRIN 1 MG: 81 TABLET, COATED ORAL at 08:28

## 2019-02-21 RX ADMIN — INSULIN ASPART 1 UNIT: 100 INJECTION, SOLUTION INTRAVENOUS; SUBCUTANEOUS at 12:08

## 2019-02-21 RX ADMIN — DOCUSATE SODIUM SCH MG: 100 CAPSULE, LIQUID FILLED ORAL at 20:12

## 2019-02-21 RX ADMIN — INSULIN ASPART 1 UNIT: 100 INJECTION, SOLUTION INTRAVENOUS; SUBCUTANEOUS at 07:35

## 2019-02-21 RX ADMIN — CARBIDOPA AND LEVODOPA 1 TAB: 25; 100 TABLET ORAL at 20:12

## 2019-02-21 RX ADMIN — FAMOTIDINE SCH MG: 20 TABLET ORAL at 20:12

## 2019-02-21 RX ADMIN — CARBIDOPA AND LEVODOPA SCH TAB: 25; 100 TABLET ORAL at 12:08

## 2019-02-21 RX ADMIN — CARBIDOPA AND LEVODOPA SCH TAB: 25; 100 TABLET ORAL at 08:28

## 2019-02-21 RX ADMIN — DOCUSATE SODIUM 1 MG: 100 CAPSULE, LIQUID FILLED ORAL at 08:27

## 2019-02-21 RX ADMIN — LINAGLIPTIN 1 MG: 5 TABLET, FILM COATED ORAL at 08:28

## 2019-02-21 RX ADMIN — CELECOXIB PRN MG: 100 CAPSULE ORAL at 08:28

## 2019-02-21 RX ADMIN — FAMOTIDINE 1 MG: 20 TABLET ORAL at 08:27

## 2019-02-21 RX ADMIN — CELECOXIB 1 MG: 100 CAPSULE ORAL at 08:28

## 2019-02-21 RX ADMIN — CARBIDOPA AND LEVODOPA 1 TAB: 25; 100 TABLET ORAL at 12:08

## 2019-02-21 RX ADMIN — INSULIN ASPART 1 UNIT: 100 INJECTION, SOLUTION INTRAVENOUS; SUBCUTANEOUS at 17:30

## 2019-02-21 RX ADMIN — INSULIN ASPART 1 UNIT: 100 INJECTION, SOLUTION INTRAVENOUS; SUBCUTANEOUS at 20:13

## 2019-02-21 RX ADMIN — CARBIDOPA AND LEVODOPA 1 TAB: 25; 100 TABLET ORAL at 08:28

## 2019-02-21 RX ADMIN — ATORVASTATIN CALCIUM SCH MG: 40 TABLET, FILM COATED ORAL at 20:12

## 2019-02-21 RX ADMIN — DOCUSATE SODIUM 1 MG: 100 CAPSULE, LIQUID FILLED ORAL at 20:12

## 2019-02-21 NOTE — PN
Date/Time of Note


Date/Time of Note


DATE: 2/21/19 


TIME: 12:31





Assessment/Plan


VTE Prophylaxis


Risk score (from Ns)>0 risk:  4


SCD applied (from Ns):  Yes


Pharmacological prophylaxis:  NA/contraindicated


Pharm contraindication:  other (High risk for fall)





Lines/Catheters


IV Catheter Type (from [facility]):  Saline Lock


Urinary Cath still in place:  No





Assessment/Plan


Hospital Course


Patient remains hemodynamically stable afebrile. Will change metformin to 1000 


mg twice daily and continue Tradjenta with recommendation from diabetic educator


to discharge patient on metformin 1000 mg twice daily and Januvia 100 mg daily. 


Continue to monitor Accu-Chek q. before meals and at bedtime.


Assessment/Plan


-Acute stroke with right-sided weakness.  Continue aspirin and Lipitor.  


Continue PT and OT.  S/p evaluation by  Dr. Braden  in neurology 


consultation.  


-Encephalopathy secondary to acute stroke


-Diabetes mellitus type 2 with hemoglobin A1c 8.1.  Continue metformin and 


Tradjenta.


-Parkinson's disease, continue Sinemet at increased dose.  Status post 


reevaluation by Dr. Braden in neurology consultation.


-S/p UTI, completed treatment with Rocephin





Further recommendations based on clinical course.  Plan of care discussed with 


Dr. Win.


Results 24hrs





Laboratory Tests


  Test
            2/20/19
17:24  2/20/19
20:13  2/21/19
08:24  2/21/19
12:04


  Bedside Glucose          105            160            120            161








Exam/Review of Systems


Exam


Vitals





Vital Signs


  Date      Temp  Pulse  Resp  B/P (MAP)   Pulse Ox  O2          O2 Flow    FiO2


Time                                                 Delivery    Rate


   2/21/19  97.8     66    18      135/69        99  Room Air


     07:30                           (91)








Intake and Output





2/20/19 2/20/19 2/21/19





1515:00


23:00


07:00





IntakeIntake Total


2100 ml


1200 ml





OutputOutput Total


500 ml





BalanceBalance


1600 ml


1200 ml











Exam


Constitutional:  alert, oriented


Respiratory:  clear to auscultation


Cardiovascular:  nl pulse


Gastrointestinal:  soft, non-tender


Musculoskeletal:  nl extremities to inspection


Extremities:  normal pulses


Neurological:  other (R sided weakness)





Results


Results 24hrs





Laboratory Tests


  Test
            2/20/19
17:24  2/20/19
20:13  2/21/19
08:24  2/21/19
12:04


  Bedside Glucose          105            160            120            161








Medications


Medication





Current Medications


Aspirin (Halfprin) 81 mg DAILY PO  Last administered on 2/21/19at 08:28; Admin 


Dose 81 MG;  Start 2/1/19 at 09:00


Atorvastatin Calcium (Lipitor) 40 mg DAILY@21 PO  Last administered on 2/20/19at


20:15; Admin Dose 40 MG;  Start 1/31/19 at 22:30


Clonidine (Catapres) 0.1 mg Q6H  PRN PO ELEVATED BLOOD PRESSURE;  Start 1/31/19 


at 23:00


Miscellaneous Information 1 ea NOTE XX ;  Start 1/31/19 at 23:00


Glucose (Glutose) 15 gm Q15M  PRN PO DECREASED GLUCOSE;  Start 1/31/19 at 23:00


Glucose (Glutose) 22.5 gm Q15M  PRN PO DECREASED GLUCOSE;  Start 1/31/19 at 


23:00


Dextrose (D50w Syringe) 25 ml Q15M  PRN IV DECREASED GLUCOSE;  Start 1/31/19 at 


23:00


Dextrose (D50w Syringe) 50 ml Q15M  PRN IV DECREASED GLUCOSE;  Start 1/31/19 at 


23:00


Glucagon (Glucagen) 1 mg Q15M  PRN IM DECREASED GLUCOSE;  Start 1/31/19 at 23:00


Glucose (Glutose) 15 gm Q15M  PRN BUCCAL DECREASED GLUCOSE;  Start 1/31/19 at 


23:00


Famotidine (Pepcid) 20 mg Q12 PO  Last administered on 2/21/19at 08:27; Admin 


Dose 20 MG;  Start 1/31/19 at 22:30


Insulin Aspart (Novolog Insulin Pen) (Adult SC Insulin - Moder... WITH MEALS  


BEDTIME SC  Last administered on 2/21/19at 12:08; Admin Dose 2 UNIT;  Start 


1/31/19 at 22:30


Diagnostic Test (Pha) (Accu-Chek) 1 ea 02 XX  Last administered on 2/18/19at 


02:37; Admin Dose 1 EA;  Start 2/1/19 at 02:00


Linagliptin (Tradjenta) 5 mg DAILY PO  Last administered on 2/21/19at 08:28; 


Admin Dose 5 MG;  Start 2/1/19 at 09:00


Ondansetron HCl (Zofran Inj) 4 mg Q6H  PRN IV NAUSEA AND/OR VOMITING;  Start 


1/31/19 at 23:00


Senna (Senokot) 1 tab HS PO  Last administered on 2/20/19at 20:15; Admin Dose 1 


TAB;  Start 2/1/19 at 21:00


Magnesium Hydroxide (Milk Of Mag) 30 ml BID  PRN PO CONSTIPATION Last 


administered on 2/1/19at 20:48; Admin Dose 30 ML;  Start 2/1/19 at 00:00


Lactulose (Enulose) 20 gm DAILY  PRN PO CONSTIPATION Last administered on 


2/2/19at 08:40; Admin Dose 20 GM;  Start 2/1/19 at 00:00


Acetaminophen (Tylenol Tab) 650 mg Q4H  PRN PO PAIN Last administered on 


2/16/19at 16:01; Admin Dose 650 MG;  Start 2/1/19 at 00:00


Miscellaneous Information (Pending Santyl Order For Wound Care) This patient 


ha... PRN  PRN XX WOUND CARE;  Start 2/1/19 at 00:00


Bisacodyl (Dulcolax Supp) 10 mg DAILY  PRN [place] CONSTIPATION Last administered on 


2/3/19at 17:58; Admin Dose 10 MG;  Start 2/2/19 at 10:30


Sodium Biphosphate/ Sodium Phosphate (Fleet Enema) 133 ml DAILY  PRN [place] 


CONSTIPATION;  Start 2/2/19 at 10:30


Polyethylene Glycol (Miralax) 17 gm DAILY  PRN PO CONSTIPATION Last administered


on 2/2/19at 12:31; Admin Dose 17 GM;  Start 2/2/19 at 10:30


Docusate Sodium (Colace) 100 mg BID PO  Last administered on 2/21/19at 08:27; 


Admin Dose 100 MG;  Start 2/3/19 at 09:00


Celecoxib (Celebrex) 100 mg DAILY  PRN PO pain Last administered on 2/21/19at 


08:28; Admin Dose 100 MG;  Start 2/5/19 at 11:30


Insulin Glargine (Lantus) 6 units DAILY@2000 SC  Last administered on 2/20/19at 


20:17; Admin Dose 6 UNITS;  Start 2/6/19 at 20:00


Carbidopa/Levodopa (Sinemet (25/ 100)) 1.5 tab TID PO  Last administered on 


2/21/19at 12:08; Admin Dose 1.5 TAB;  Start 2/13/19 at 09:00


Metformin HCl (Glucophage) 500 mg WITH  BREAKFAST PO  Last administered on 


2/21/19at 08:28; Admin Dose 500 MG;  Start 2/17/19 at 07:35











LUCY CHEN             Feb 21, 2019 12:39

## 2019-02-22 VITALS — HEART RATE: 72 BPM | SYSTOLIC BLOOD PRESSURE: 125 MMHG | DIASTOLIC BLOOD PRESSURE: 66 MMHG | RESPIRATION RATE: 18 BRPM

## 2019-02-22 VITALS — RESPIRATION RATE: 18 BRPM | SYSTOLIC BLOOD PRESSURE: 138 MMHG | HEART RATE: 69 BPM | DIASTOLIC BLOOD PRESSURE: 64 MMHG

## 2019-02-22 RX ADMIN — LINAGLIPTIN 1 MG: 5 TABLET, FILM COATED ORAL at 08:21

## 2019-02-22 RX ADMIN — CARBIDOPA AND LEVODOPA 1 TAB: 25; 100 TABLET ORAL at 08:17

## 2019-02-22 RX ADMIN — DOCUSATE SODIUM 1 MG: 100 CAPSULE, LIQUID FILLED ORAL at 08:21

## 2019-02-22 RX ADMIN — DOCUSATE SODIUM SCH MG: 100 CAPSULE, LIQUID FILLED ORAL at 08:21

## 2019-02-22 RX ADMIN — CARBIDOPA AND LEVODOPA SCH TAB: 25; 100 TABLET ORAL at 08:17

## 2019-02-22 RX ADMIN — FAMOTIDINE SCH MG: 20 TABLET ORAL at 08:18

## 2019-02-22 RX ADMIN — ASPIRIN SCH MG: 81 TABLET, COATED ORAL at 08:17

## 2019-02-22 RX ADMIN — INSULIN ASPART 1 UNIT: 100 INJECTION, SOLUTION INTRAVENOUS; SUBCUTANEOUS at 07:35

## 2019-02-22 RX ADMIN — FAMOTIDINE 1 MG: 20 TABLET ORAL at 08:18

## 2019-02-22 RX ADMIN — ASPIRIN 1 MG: 81 TABLET, COATED ORAL at 08:17

## 2019-02-22 NOTE — PN
DATE:  02/22/2019

 

 

SUBJECTIVE:  Follow up on acute CVA, diabetes, parkinsonism and recent encephalopathy.  The patient c
ontinues to improve.  Denies any chest pain.  No new neurological deficit.  No reported fever or chil
ls.  No reported hypoglycemic episode.  No reported bleeding from any sites.

 

PHYSICAL EXAMINATION:

GENERAL:  Revealed the patient to be awake, alert.

VITAL SIGNS:  This morning, temperature 98, pulse 69, respiration 19, blood pressure 138/64, O2 satur
ation 98% on room air.

HEENT:  No eye discharge or redness.  Conjunctivae and lids are normal.  Oropharynx is clear.

NECK:  Supple.  No mass, no thyromegaly.

CHEST:  Fairly clear.

CARDIOVASCULAR:  S1, S2 normal.  No murmur.

ABDOMEN:  Soft and nontender.  Bowel sounds are present.

EXTREMITIES:  No leg edema.

NEUROLOGIC:  The patient is awake, alert, follows simple commands.  The patient does have increased t
one due to parkinsonism and mild weakness on the right side.

 

LABORATORY DATA:  Recently, WBC 10, hemoglobin 13.6, platelets 413.  Glucose this morning 100, sodium
 140, potassium 3.9, BUN 16, creatinine 0.6.

 

IMPRESSION:

1.  Acute cerebrovascular accident.

2.  Diabetes.

3.  Parkinsonism.

4.  Dyslipidemia.

 

PLAN:  The patient will be discharged home.  The patient will be continued on Tradjenta and metformin
 for diabetes and Sinemet for parkinsonism, aspirin for CVA and Lipitor for dyslipidemia.  Home healt
h has been arranged.  The patient will follow up with PCP upon discharge.

 

 

Dictated By: NAZ VENTURA/ESPERANZA

DD:    02/22/2019 13:24:52

DT:    02/22/2019 14:39:52

Conf#: [id number]

DID#:  [id number]

CC: MARS POMPA MD; ALBINA ELLISON MD;*EndCC*

## 2019-02-22 NOTE — DS
Date/Time of Note


Date/Time of Note


DATE: 2/22/19 


TIME: 12:26





Discharge Summary


Admission/Discharge Info


Admit Date/Time


Jan 31, 2019 at 21:02


Discharge Date/Time


Feb 22, 2019 at 11:30


Discharge Diagnosis


1.Left corona radiata frontal infarct cerebrovascular accident with right-sided 


weakness.


2.  Parkinson's.


3.  Bilateral common carotid artery stenosis.


4.  Urinary tract infection.


5.  Dysphagia, improved


6.  Diabetes mellitus.


7.  Improvements in self-care, mobility and cognition.


Patient Condition:  Good


Hospital Course


The patient was admitted for comprehensive interdisciplinary rehabilitation and 


made steady functional gains from a Max/dependent level to a min level for self 


care tasks and mobility including ambulating over 35 feet with the use of a FWW.


Patient is being discharged home with the recommendation of home health PT, OT 


and RN follow up. Caregiver training was performed, and caregivers were able to 


demonstrate safe carry over of technique. The DC meds are per the medication 


reconciliation sheet.  The discharge equipment recommendations include: FWW, 


BSC, shower chair.  The patient will follow up with PMD upon DC.


Home Meds


Active Scripts


Carbidopa/Levodopa (CARBIDOPA-LEVO  MG ODT) 1 Each Tab.rapdis, 1 TAB PO 


TID, #90 TAB


   Prov:ALBINA ELLISON MD         1/23/19


Reported Medications


Metformin* (Glucophage*) 500 Mg Tab, 500 MG PO BID, #90 TAB


   1/23/19


Primary Care Provider


Carlos Duenas MD


Pending Labs





Laboratory Tests


  Test
                  2/21/19
17:23       2/21/19
20:11       2/22/19
08:02


  Bedside Glucose
  168 mg/dL
()  144 mg/dL
()  100 mg/dL
()














MARS POMPA MD             Feb 22, 2019 12:28

## 2020-03-31 NOTE — NUR
EOSS: Stable, no acute change noted during the shift will endorse to PM shift. Pt is set up CT scan for end of May 2020

## 2022-01-03 NOTE — PN
Date/Time of Note


Date/Time of Note


DATE: 2/15/19 


TIME: 11:37





Assessment/Plan


VTE Prophylaxis


[place] [facility]:  4


[place] [facility]  No


SCD contraindicated:  other


Pharmacological prophylaxis:  other


Pharm contraindication:  other





Lines/Catheters


IV Catheter Type (from [facility]):  Saline Lock


Urinary Cath still in place:  No





Assessment/Plan


Assessment/Plan


-Acute stroke with right-sided weakness.  Continue aspirin and Lipitor.  


Continue PT and OT.  S/p evaluation by  Dr. Braden  in neurology 


consultation.  


-Encephalopathy secondary to acute stroke


-Diabetes mellitus type 2 with hemoglobin A1c 8.1.  Continue metformin and 


Tradjenta, NovoLog per mild algorithm sliding scale.


-Parkinson's disease, continue Sinemet.


-S/p UTI, completed treatment with Rocephin





Further recommendations based on clinical course.  Plan of care discussed with 


Dr. Win.


Results 24hrs





Laboratory Tests


  Test
            2/14/19
11:53  2/14/19
17:32  2/14/19
20:59  2/15/19
07:39


  Bedside Glucose          149             91            152             77








Subjective


24 Hr Interval Summary


Free Text/Dictation


nad


afebrile


tolerating PT


No new events reported overnight 


dw stAFF


Eyes:  no complaints


ENT:  no complaints


Respiratory:  no complaints


Cardiovascular:  no complaints


Gastrointestinal:  no complaints


Genitourinary:  no complaints


Musculoskeletal:  other (general weakness)


Neurologic:  no complaints


Psychological:  no complaints





Exam/Review of Systems


Exam


Vitals





Vital Signs


  Date      Temp  Pulse  Resp  B/P (MAP)   Pulse Ox  O2          O2 Flow    FiO2


Time                                                 Delivery    Rate


   2/15/19  97.6     64    18      123/61        97  Room Air


     07:00                           (81)








Intake and Output





2/14/19


2/14/19


2/15/19





1515:00


23:00


07:00





IntakeIntake Total


1200 ml


200 ml





OutputOutput Total


750 ml





BalanceBalance


450 ml


200 ml











Constitutional:  alert, well developed


Psych:  nl mood/affect


Head:  normocephalic


Eyes:  EOMI, nl lids, nl sclera


ENMT:  nl external ears & nose


Neck:  non-tender


Respiratory:  clear to auscultation (bilatrrally)


Cardiovascular:  nl pulses, other (s1s2)


Gastrointestinal:  soft, non-tender


Musculoskeletal:  muscle weakness


Extremities:  normal pulses


Neurological:  nl speech, confused


Lymph:  nontender





Results


Results 24hrs





Laboratory Tests


  Test
            2/14/19
11:53  2/14/19
17:32  2/14/19
20:59  2/15/19
07:39


  Bedside Glucose          149             91            152             77








Medications


Medication





Current Medications


Aspirin (Halfprin) 81 mg DAILY PO  Last administered on 2/15/19at 08:49; Admin 


Dose 81 MG;  Start 2/1/19 at 09:00


Atorvastatin Calcium (Lipitor) 40 mg DAILY@21 PO  Last administered on 2/14/19at


20:57; Admin Dose 40 MG;  Start 1/31/19 at 22:30


Bisacodyl (Dulcolax) 10 mg DAILY PO  Last administered on 2/15/19at 08:49; Admin


Dose 10 MG;  Start 2/1/19 at 09:00


Clonidine (Catapres) 0.1 mg Q6H  PRN PO ELEVATED BLOOD PRESSURE;  Start 1/31/19 


at 23:00


Miscellaneous Information 1 ea NOTE XX ;  Start 1/31/19 at 23:00


Glucose (Glutose) 15 gm Q15M  PRN PO DECREASED GLUCOSE;  Start 1/31/19 at 23:00


Glucose (Glutose) 22.5 gm Q15M  PRN PO DECREASED GLUCOSE;  Start 1/31/19 at 


23:00


Dextrose (D50w Syringe) 25 ml Q15M  PRN IV DECREASED GLUCOSE;  Start 1/31/19 at 


23:00


Dextrose (D50w Syringe) 50 ml Q15M  PRN IV DECREASED GLUCOSE;  Start 1/31/19 at 


23:00


Glucagon (Glucagen) 1 mg Q15M  PRN IM DECREASED GLUCOSE;  Start 1/31/19 at 23:00


Glucose (Glutose) 15 gm Q15M  PRN BUCCAL DECREASED GLUCOSE;  Start 1/31/19 at 23


:00


Famotidine (Pepcid) 20 mg Q12 PO  Last administered on 2/15/19at 08:48; Admin 


Dose 20 MG;  Start 1/31/19 at 22:30


Insulin Aspart (Novolog Insulin Pen) (Adult SC Insulin - Moder... WITH MEALS  


BEDTIME SC  Last administered on 2/14/19at 11:55; Admin Dose 2 UNIT;  Start 


1/31/19 at 22:30


Diagnostic Test (Pha) (Accu-Chek) 1 ea 02 XX  Last administered on 2/6/19at 


02:37; Admin Dose 1 EA;  Start 2/1/19 at 02:00


Linagliptin (Tradjenta) 5 mg DAILY PO  Last administered on 2/15/19at 08:49; 


Admin Dose 5 MG;  Start 2/1/19 at 09:00


Metformin HCl (Glucophage) 500 mg BID WITH  MEALS PO  Last administered on 


2/15/19at 07:41; Admin Dose 500 MG;  Start 2/1/19 at 07:35


Ondansetron HCl (Zofran Inj) 4 mg Q6H  PRN IV NAUSEA AND/OR VOMITING;  Start 


1/31/19 at 23:00


Senna (Senokot) 1 tab HS PO  Last administered on 2/14/19at 20:57; Admin Dose 1 


TAB;  Start 2/1/19 at 21:00


Magnesium Hydroxide (Milk Of Mag) 30 ml BID  PRN PO CONSTIPATION Last 


administered on 2/1/19at 20:48; Admin Dose 30 ML;  Start 2/1/19 at 00:00


Lactulose (Enulose) 20 gm DAILY  PRN PO CONSTIPATION Last administered on 


2/2/19at 08:40; Admin Dose 20 GM;  Start 2/1/19 at 00:00


Acetaminophen (Tylenol Tab) 650 mg Q4H  PRN PO PAIN Last administered on 


2/5/19at 09:46; Admin Dose 650 MG;  Start 2/1/19 at 00:00


Miscellaneous Information (Pending Santyl Order For Wound Care) This patient 


ha... PRN  PRN XX WOUND CARE;  Start 2/1/19 at 00:00


Bisacodyl (Dulcolax Supp) 10 mg DAILY  PRN [place] CONSTIPATION Last administered on 


2/3/19at 17:58; Admin Dose 10 MG;  Start 2/2/19 at 10:30


Sodium Biphosphate/ Sodium Phosphate (Fleet Enema) 133 ml DAILY  PRN [place] 


CONSTIPATION;  Start 2/2/19 at 10:30


Polyethylene Glycol (Miralax) 17 gm DAILY  PRN PO CONSTIPATION Last administered


on 2/2/19at 12:31; Admin Dose 17 GM;  Start 2/2/19 at 10:30


Docusate Sodium (Colace) 100 mg BID PO  Last administered on 2/15/19at 08:48; 


Admin Dose 100 MG;  Start 2/3/19 at 09:00


Celecoxib (Celebrex) 100 mg DAILY  PRN PO pain Last administered on 2/13/19at 


12:14; Admin Dose 100 MG;  Start 2/5/19 at 11:30


Insulin Glargine (Lantus) 6 units DAILY@2000 SC  Last administered on 2/14/19at 


21:03; Admin Dose 6 UNITS;  Start 2/6/19 at 20:00


Carbidopa/Levodopa (Sinemet (25/ 100)) 1.5 tab TID PO  Last administered on 


2/15/19at 08:48; Admin Dose 1.5 TAB;  Start 2/13/19 at 09:00











BERYL GARCIA                 Feb 15, 2019 11:41 SUBJECTIVE:   CC: Tavo Key is an 41 year old male who presents for preventative health visit.       Patient has been advised of split billing requirements and indicates understanding: Yes  Healthy Habits:     Getting at least 3 servings of Calcium per day:  Yes    Bi-annual eye exam:  Yes    Dental care twice a year:  Yes    Sleep apnea or symptoms of sleep apnea:  Sleep apnea    Diet:  Diabetic    Frequency of exercise:  1 day/week    Duration of exercise:  30-45 minutes    Taking medications regularly:  Yes    Medication side effects:  Other    PHQ-2 Total Score: 1    Additional concerns today:  No    Bipolar-seeing his psychiatrist through video visits. Interested in transferring care to the [facility] had recently moved from [place].   Sleep apnea- needs new equipment. Last sleep study was was in [place], reviewed in care everywhere.   GERD- well controlled with nexium.   Migraines- improved. Regular headaches way up, but migraines good. Propranolol prevention. Feels more sinus congestion. Not taking anything. Has a humidifier.   HTN- did not take meds this morning. Checks at home.   Asthma- uses the inhaler once per day. Uses his albuterol very rarely.   DM- no weight loss noted with the victoza. Has gym membership but needs to go. Last eye exam 11/21- normal.   Dry skin- has hard water. Normal TSH about 1 year.       Today's PHQ-2 Score:   PHQ-2 ( 1999 [employer]) 1/3/2022   Q1: Little interest or pleasure in doing things 1   Q2: Feeling down, depressed or hopeless 0   PHQ-2 Score 1   Q1: Little interest or pleasure in doing things -   Q2: Feeling down, depressed or hopeless -   PHQ-2 Score -       Abuse: Current or Past(Physical, Sexual or Emotional)- No  Do you feel safe in your environment? Yes    Have you ever done Advance Care Planning? (For example, a Health Directive, POLST, or a discussion with a medical provider or your loved ones about your wishes): No, advance care planning information given to patient  to review.  Patient plans to discuss their wishes with loved ones or provider.      Social History     Tobacco Use     Smoking status: Former Smoker     Packs/day: 0.00     Years: 0.00     Pack years: 0.00     Quit date:      Years since quittin.0     Smokeless tobacco: Never Used   Substance Use Topics     Alcohol use: Not Currently     If you drink alcohol do you typically have >3 drinks per day or >7 drinks per week? No    Alcohol Use 1/3/2022   Prescreen: >3 drinks/day or >7 drinks/week? -   Prescreen: >3 drinks/day or >7 drinks/week? No       Last PSA: No results found for: PSA    Reviewed orders with patient. Reviewed health maintenance and updated orders accordingly - Yes  Labs reviewed in EPIC    Reviewed and updated as needed this visit by clinical staff  Tobacco  Allergies  Meds  Problems  Med Hx  Surg Hx  Fam Hx         Reviewed and updated as needed this visit by Provider  Tobacco  Allergies  Meds  Problems  Med Hx  Surg Hx  Fam Hx            Review of Systems   Constitutional: Negative for chills and fever.   HENT: Negative for congestion, ear pain, hearing loss and sore throat.    Eyes: Negative for pain and visual disturbance.   Respiratory: Negative for cough and shortness of breath.    Cardiovascular: Negative for chest pain, palpitations and peripheral edema.   Gastrointestinal: Positive for heartburn and nausea. Negative for abdominal pain, constipation, diarrhea and hematochezia.   Genitourinary: Positive for impotence. Negative for dysuria, frequency, genital sores, hematuria, penile discharge and urgency.   Musculoskeletal: Negative for arthralgias, joint swelling and myalgias.   Skin: Negative for rash.   Neurological: Positive for headaches. Negative for dizziness, weakness and paresthesias.   Psychiatric/Behavioral: Positive for mood changes. The patient is nervous/anxious.          OBJECTIVE:   BP (!) 143/97 (BP Location: Right arm, Patient Position: Chair, Cuff Size:  "Adult Large)   Pulse 80   Temp 97.9  F (36.6  C) (Oral)   Ht 1.727 m (5' 8\")   Wt 119.3 kg (263 lb)   SpO2 97%   BMI 39.99 kg/m      Physical Exam  GENERAL: healthy, alert and no distress  EYES: Eyes grossly normal to inspection, PERRL and conjunctivae and sclerae normal  HENT: ear canals and TM's normal, nose and mouth without ulcers or lesions  NECK: no adenopathy, no asymmetry, masses, or scars and thyroid normal to palpation  RESP: lungs clear to auscultation - no rales, rhonchi or wheezes  CV: regular rate and rhythm, normal S1 S2, no S3 or S4, no murmur, click or rub, no peripheral edema and peripheral pulses strong  ABDOMEN: soft, nontender, no hepatosplenomegaly, no masses and bowel sounds normal  MS: no gross musculoskeletal defects noted, no edema  SKIN: no suspicious lesions or rashes  NEURO: Normal strength and tone, mentation intact and speech normal  PSYCH: mentation appears normal, affect normal/bright  Diabetic foot exam: normal DP and PT pulses, no trophic changes or ulcerative lesions, normal sensory exam and normal monofilament exam    Diagnostic Test Results:  Labs reviewed in Epic    ASSESSMENT/PLAN:   (Z00.00) Routine general medical examination at a health care facility  (primary encounter diagnosis)  Comment: Plan:     (E66.01) Morbid obesity (H)  Plan: liraglutide (VICTOZA) 18 MG/3ML solution        Will work on weight loss.     (I10) Hypertension goal BP (blood pressure) < 140/90  Comment: high today, normal at home.   Plan: amLODIPine (NORVASC) 10 MG tablet, losartan         (COZAAR) 50 MG tablet        Recheck at home after taking meds and then mychart with numbers.     (R80.9) Proteinuria, unspecified type  Plan: amLODIPine (NORVASC) 10 MG tablet, losartan         (COZAAR) 50 MG tablet            (E11.9) Type 2 diabetes mellitus without complication, without long-term current use of insulin (H)  Plan: HEMOGLOBIN A1C, liraglutide (VICTOZA) 18 MG/3ML        solution, metFORMIN " "(GLUCOPHAGE-XR) 500 MG 24         hr tablet        Last A1C was high. Will check today. Continue same meds. Has gained weight on Victoza, may need to think about jardiance or trulicity if weight gain continues.     (F31.62) Bipolar mixed affective disorder, moderate (H)  Plan: Adult Mental Health Referral        Referral to [facility] for psych and counseling through [facility].     (F41.1) MATTI (generalized anxiety disorder)  Comment:   Plan: Adult Mental Health Referral        As above.     (G47.30) Sleep apnea with use of continuous positive airway pressure (CPAP)  Comment:   Plan: CPAP Order for DME - ONLY FOR DME, fluticasone         (FLONASE) 50 MCG/ACT nasal spray        New supplies ordered.     (N52.9) Erectile dysfunction, unspecified erectile dysfunction type  Plan: tadalafil (CIALIS) 10 MG tablet        Prn use.     (G43.809) Other migraine without status migrainosus, not intractable  Plan: fluticasone (FLONASE) 50 MCG/ACT nasal spray        Trial of flonase to see if congestion improves.       Patient has been advised of split billing requirements and indicates understanding: Yes  COUNSELING:   Reviewed preventive health counseling, as reflected in patient instructions       Regular exercise       Healthy diet/nutrition       Colon cancer screening       Prostate cancer screening    Estimated body mass index is 39.99 kg/m  as calculated from the following:    Height as of this encounter: 1.727 m (5' 8\").    Weight as of this encounter: 119.3 kg (263 lb).     Weight management plan: Discussed healthy diet and exercise guidelines    He reports that he quit smoking about 12 years ago. He smoked 0.00 packs per day for 0.00 years. He has never used smokeless tobacco.      Counseling Resources:  ATP IV Guidelines  Pooled Cohorts Equation Calculator  FRAX Risk Assessment  ICSI Preventive Guidelines  Dietary Guidelines for Americans, 2010  USDA's MyPlate  ASA Prophylaxis  Lung CA Screening    Criselda Walsh PA-C  M " [facility] [place]

## 2022-06-20 NOTE — PN
Date/Time of Note


Date/Time of Note


DATE: 1/28/19 


TIME: 15:03





Assessment/Plan


VTE Prophylaxis


Risk score (from Ns)>0 risk:  6


SCD applied (from Ns):  Yes


Pharmacological prophylaxis:  LMWH





Lines/Catheters


IV Catheter Type (from [facility]):  Peripheral IV


Urinary Cath still in place:  No





Assessment/Plan


Hospital Course


Patient is awake alert, tolerates pured diet, patient with bilateral lower 


extremities and right upper and lower extremities weakness continue physical 


therapy, acute rehab evaluation.


Assessment/Plan


-Acute stroke with right-sided weakness.  Continue aspirin Lipitor.  Continue PT


and OT.   Dr. Braden is following  in neurology consultation.  


-Gram-negative rods UTI, continue Rocephin.  


-Diabetes mellitus type 2 with hemoglobin A1c 8.1.  Continue metformin and 


Tradjenta, NovoLog per mild algorithm sliding scale.


-Parkinson's disease, continue Sinemet.





Further recommendations based on clinical course.  Plan of care discussed with 


Dr. Win.


Result Diagram:  


1/27/19 1216                                                                    


           1/27/19 1216





Results 24hrs





Laboratory Tests


  Test
            1/27/19
17:05  1/27/19
21:22  1/28/19
01:44  1/28/19
07:50


  Bedside Glucose          169            205           222  H          167


  Test
            1/28/19
12:09  
              
              



  Bedside Glucose          157








Exam/Review of Systems


Exam


Vitals





Vital Signs


  Date      Temp  Pulse  Resp  B/P (MAP)   Pulse Ox  O2          O2 Flow    FiO2


Time                                                 Delivery    Rate


   1/28/19  98.0     89    18      132/76        96


     12:09                           (94)


   1/25/19                                           Room Air


     11:58








Intake and Output





1/27/19 1/27/19 1/28/19





1515:00


23:00


07:00





IntakeIntake Total


1890 ml


950 ml





OutputOutput Total


800 ml


2000 ml





BalanceBalance


1090 ml


-1050 ml











Exam


Constitutional:  alert, oriented


Respiratory:  clear to auscultation


Cardiovascular:  nl pulses


Gastrointestinal:  soft, non-tender


Musculoskeletal:  nl extremities to inspection


Extremities:  normal pulses


Neurological:  other (Right-sided weakness)


Skin:  other (Right knee abrasion, mid chest scar)





Results


Results 24hrs





Laboratory Tests


  Test
            1/27/19
17:05  1/27/19
21:22  1/28/19
01:44  1/28/19
07:50


  Bedside Glucose          169            205           222  H          167


  Test
            1/28/19
12:09  
              
              



  Bedside Glucose          157








Medications


Medication





Current Medications


Potassium Chloride/Dextrose/ Sod Cl 1,000 ml @  70 mls/hr [id number] IV  Last 


administered on 1/28/19at 10:58; Admin Dose 70 MLS/HR;  Start 1/23/19 at 16:04


Ondansetron HCl (Zofran Inj) 4 mg Q6H  PRN IV NAUSEA;  Start 1/23/19 at 16:30


Ceftriaxone Sodium 50 ml @  100 mls/hr Q24H IVPB  Last administered on 1/27/19at


17:04; Admin Dose 100 MLS/HR;  Start 1/23/19 at 17:00


Miscellaneous Information 1 ea NOTE XX ;  Start 1/23/19 at 17:00


Glucose (Glutose) 15 gm Q15M  PRN PO DECREASED GLUCOSE;  Start 1/23/19 at 17:00


Glucose (Glutose) 22.5 gm Q15M  PRN PO DECREASED GLUCOSE;  Start 1/23/19 at 


17:00


Dextrose (D50w Syringe) 25 ml Q15M  PRN IV DECREASED GLUCOSE;  Start 1/23/19 at 


17:00


Dextrose (D50w Syringe) 50 ml Q15M  PRN IV DECREASED GLUCOSE;  Start 1/23/19 at 


17:00


Glucagon (Glucagen) 1 mg Q15M  PRN IM DECREASED GLUCOSE;  Start 1/23/19 at 17:00


Glucose (Glutose) 15 gm Q15M  PRN BUCCAL DECREASED GLUCOSE;  Start 1/23/19 at 


17:00


Atorvastatin Calcium (Lipitor) 40 mg HS PO  Last administered on 1/27/19at 


21:18; Admin Dose 40 MG;  Start 1/24/19 at 21:00


Carbidopa/Levodopa (Sinemet (25/ 100)) 1 tab TID PO  Last administered on 


1/28/19at 12:09; Admin Dose 1 TAB;  Start 1/24/19 at 09:00


Metformin HCl (Glucophage) 500 mg BID WITH  MEALS PO  Last administered on 


1/28/19at 07:51; Admin Dose 500 MG;  Start 1/25/19 at 08:00


Diagnostic Test (Pha) (Accu-Chek) 1 ea 02 XX  Last administered on 1/28/19at 


01:51; Admin Dose 1 EA;  Start 1/25/19 at 02:00


Famotidine (Pepcid) 20 mg Q12 PO  Last administered on 1/28/19at 08:47; Admin 


Dose 20 MG;  Start 1/25/19 at 21:00


Insulin Glargine (Lantus) 10 units DAILY@2000 SC  Last administered on 1/27/19at


21:31; Admin Dose 10 UNITS;  Start 1/25/19 at 20:30


Docusate Sodium (Colace) 100 mg BID PO  Last administered on 1/28/19at 08:47; 


Admin Dose 100 MG;  Start 1/26/19 at 14:30


Morphine Sulfate (morphine) 6 mg Q4H  PRN PO MOD PAIN 4-6 Last administered on 


1/27/19at 15:42; Admin Dose 6 MG;  Start 1/26/19 at 14:30


Insulin Aspart (Novolog Insulin Pen) NOVOLOG *MODERATE* ALGORITHM WITH MEALS  


BEDTIME SC  Last administered on 1/28/19at 12:14; Admin Dose 2 UNIT;  Start 


1/26/19 at 18:00


Clonidine (Catapres) 0.1 mg Q6H  PRN GTB sbp >160;  Start 1/27/19 at 11:30


Bisacodyl (Dulcolax) 10 mg DAILY PO  Last administered on 1/28/19at 08:47; Admin


Dose 10 MG;  Start 1/27/19 at 15:00


Linagliptin (Tradjenta) 5 mg DAILY PO ;  Start 1/29/19 at 09:00











LUCY CHEN             Jan 28, 2019 15:13 Speech Therapy    Patient not seen in therapy today.    Unavailable due to medical tests/procedures.      Additional details:  Speech therapy acknowledges evaluation/treatment order; patient currently off unit at dialysis then will have MRI per RN report. Speech therapy to re-attempt evaluation 6/21/22 or as appropriate.     Page/message as appropriate.         OBJECTIVE                  Documented in the chart in the following areas: Assessment.      Therapy procedure time and total treatment time can be found documented on the Time Entry flowsheet

## 2025-05-10 NOTE — DS
Date/Time of Note


Date/Time of Note


DATE: 2/3/19 


TIME: 23:05





Discharge Summary


Admission/Discharge Info


Admit Date/Time


Jan 23, 2019 at 14:08


Discharge Date/Time


Jan 31, 2019 at 20:52


Patient Condition:  Stable


Hx of Present Illness


Patient is 78-year-old gentleman with history of diabetes and Parkinson's 


disease.  Patient had sudden onset of right-sided weakness and drooling 


yesterday at 9:00 in the morning witnessed by patient's wife.  Patient also 


collapsed due to persistent right-sided weakness and pain.  CT of the head was 


negative for hemorrhagic stroke.  Patient was diagnosed with acute stroke but he


was outside of the window for TPA.  Patient was given a rectal aspirin since he 


failed bedside swallow evaluation.  Patient denies fever, chills, denies 


shortness of breath, denies chest pain, denies lower extremity pain.  Patient is


admitted for further evaluation and management.


Hospital Course


Patient discharged to acute rehab.


Assessment/Plan


-Acute stroke with right-sided weakness.  Continue aspirin Lipitor.  Continue PT


and OT.   Dr. Braden is following  in neurology consultation.  


-Gram-negative rods UTI, s/p  Rocephin


-Diabetes mellitus type 2 with hemoglobin A1c 8.1.  Continue metformin and 


Tradjenta, NovoLog per mild algorithm sliding scale.


-Parkinson's disease, continue Sinemet.





Plan of care discussed with Dr. Win.


Home Meds


Active Scripts


Carbidopa/Levodopa (CARBIDOPA-LEVO  MG ODT) 1 Each Tab.rapdis, 1 TAB PO 


TID, #90 TAB


   Prov:ALBINA BRADEN MD         1/23/19


Reported Medications


Metformin* (Glucophage*) 500 Mg Tab, 500 MG PO BID, #90 TAB


   1/23/19


Primary Care Provider


Carlos Duenas MD


Time spent on discharge:   > 30 minutes











LUCY CHEN              Feb 3, 2019 23:06 25-Aug-2024